# Patient Record
Sex: FEMALE | HISPANIC OR LATINO | Employment: FULL TIME | URBAN - METROPOLITAN AREA
[De-identification: names, ages, dates, MRNs, and addresses within clinical notes are randomized per-mention and may not be internally consistent; named-entity substitution may affect disease eponyms.]

---

## 2019-01-16 ENCOUNTER — TRANSFERRED RECORDS (OUTPATIENT)
Dept: HEALTH INFORMATION MANAGEMENT | Facility: CLINIC | Age: 34
End: 2019-01-16

## 2019-01-16 LAB
C TRACH DNA SPEC QL PROBE+SIG AMP: NEGATIVE
N GONORRHOEA DNA SPEC QL PROBE+SIG AMP: NEGATIVE
PAP-ABSTRACT: NORMAL
SPECIMEN DESCRIP: NORMAL
SPECIMEN DESCRIPTION: NORMAL

## 2020-02-10 ENCOUNTER — OFFICE VISIT (OUTPATIENT)
Dept: FAMILY MEDICINE | Facility: CLINIC | Age: 35
End: 2020-02-10
Payer: COMMERCIAL

## 2020-02-10 VITALS
DIASTOLIC BLOOD PRESSURE: 84 MMHG | OXYGEN SATURATION: 98 % | TEMPERATURE: 99.3 F | HEIGHT: 66 IN | BODY MASS INDEX: 37.45 KG/M2 | HEART RATE: 88 BPM | WEIGHT: 233 LBS | SYSTOLIC BLOOD PRESSURE: 136 MMHG

## 2020-02-10 DIAGNOSIS — F41.8 DEPRESSION WITH ANXIETY: ICD-10-CM

## 2020-02-10 DIAGNOSIS — E66.9 OBESITY (BMI 35.0-39.9 WITHOUT COMORBIDITY): ICD-10-CM

## 2020-02-10 DIAGNOSIS — E66.01 MORBID OBESITY (H): ICD-10-CM

## 2020-02-10 PROBLEM — R80.9 MICROALBUMINURIA DUE TO TYPE 2 DIABETES MELLITUS (H): Status: ACTIVE | Noted: 2019-07-03

## 2020-02-10 PROBLEM — H44.113 PANUVEITIS OF BOTH EYES: Status: ACTIVE | Noted: 2018-11-08

## 2020-02-10 PROBLEM — K76.0 NAFLD (NONALCOHOLIC FATTY LIVER DISEASE): Status: ACTIVE | Noted: 2019-07-11

## 2020-02-10 PROBLEM — E11.29 MICROALBUMINURIA DUE TO TYPE 2 DIABETES MELLITUS (H): Status: ACTIVE | Noted: 2019-07-03

## 2020-02-10 LAB
ANION GAP SERPL CALCULATED.3IONS-SCNC: 9 MMOL/L (ref 3–14)
BUN SERPL-MCNC: 9 MG/DL (ref 7–30)
CALCIUM SERPL-MCNC: 10 MG/DL (ref 8.5–10.1)
CHLORIDE SERPL-SCNC: 104 MMOL/L (ref 94–109)
CO2 SERPL-SCNC: 26 MMOL/L (ref 20–32)
CREAT SERPL-MCNC: 0.54 MG/DL (ref 0.52–1.04)
GFR SERPL CREATININE-BSD FRML MDRD: >90 ML/MIN/{1.73_M2}
GLUCOSE SERPL-MCNC: 164 MG/DL (ref 70–99)
HBA1C MFR BLD: 7 % (ref 0–5.6)
POTASSIUM SERPL-SCNC: 4.2 MMOL/L (ref 3.4–5.3)
SODIUM SERPL-SCNC: 139 MMOL/L (ref 133–144)

## 2020-02-10 PROCEDURE — 99203 OFFICE O/P NEW LOW 30 MIN: CPT | Performed by: NURSE PRACTITIONER

## 2020-02-10 PROCEDURE — 36415 COLL VENOUS BLD VENIPUNCTURE: CPT | Performed by: NURSE PRACTITIONER

## 2020-02-10 PROCEDURE — 83036 HEMOGLOBIN GLYCOSYLATED A1C: CPT | Performed by: NURSE PRACTITIONER

## 2020-02-10 PROCEDURE — 80048 BASIC METABOLIC PNL TOTAL CA: CPT | Performed by: NURSE PRACTITIONER

## 2020-02-10 RX ORDER — LISINOPRIL 2.5 MG/1
2.5 TABLET ORAL
COMMUNITY
Start: 2019-10-10 | End: 2021-06-03

## 2020-02-10 RX ORDER — SEMAGLUTIDE 1.34 MG/ML
1 INJECTION, SOLUTION SUBCUTANEOUS
COMMUNITY
Start: 2019-07-16 | End: 2020-02-10

## 2020-02-10 RX ORDER — ONDANSETRON 8 MG/1
TABLET, ORALLY DISINTEGRATING ORAL
COMMUNITY
Start: 2019-06-28 | End: 2021-05-25

## 2020-02-10 RX ORDER — PREDNISOLONE ACETATE 10 MG/ML
SUSPENSION/ DROPS OPHTHALMIC
COMMUNITY
Start: 2017-07-19 | End: 2021-05-25

## 2020-02-10 RX ORDER — LANCETS
EACH MISCELLANEOUS
COMMUNITY
Start: 2019-09-18

## 2020-02-10 RX ORDER — BLOOD SUGAR DIAGNOSTIC
STRIP MISCELLANEOUS
COMMUNITY
Start: 2019-08-21

## 2020-02-10 ASSESSMENT — ANXIETY QUESTIONNAIRES
1. FEELING NERVOUS, ANXIOUS, OR ON EDGE: MORE THAN HALF THE DAYS
5. BEING SO RESTLESS THAT IT IS HARD TO SIT STILL: SEVERAL DAYS
2. NOT BEING ABLE TO STOP OR CONTROL WORRYING: SEVERAL DAYS
IF YOU CHECKED OFF ANY PROBLEMS ON THIS QUESTIONNAIRE, HOW DIFFICULT HAVE THESE PROBLEMS MADE IT FOR YOU TO DO YOUR WORK, TAKE CARE OF THINGS AT HOME, OR GET ALONG WITH OTHER PEOPLE: SOMEWHAT DIFFICULT
6. BECOMING EASILY ANNOYED OR IRRITABLE: SEVERAL DAYS
7. FEELING AFRAID AS IF SOMETHING AWFUL MIGHT HAPPEN: NOT AT ALL
3. WORRYING TOO MUCH ABOUT DIFFERENT THINGS: SEVERAL DAYS
GAD7 TOTAL SCORE: 8

## 2020-02-10 ASSESSMENT — MIFFLIN-ST. JEOR: SCORE: 1783.63

## 2020-02-10 ASSESSMENT — PATIENT HEALTH QUESTIONNAIRE - PHQ9
5. POOR APPETITE OR OVEREATING: MORE THAN HALF THE DAYS
SUM OF ALL RESPONSES TO PHQ QUESTIONS 1-9: 10

## 2020-02-10 ASSESSMENT — PAIN SCALES - GENERAL: PAINLEVEL: NO PAIN (0)

## 2020-02-10 NOTE — Clinical Note
Please abstract the following data from this visit with this patient into the appropriate field in Epic:Tests that can be patient reported without a hard copy:{Quality Abstract List (Optional):083021}Other Tests found in the patient's chart through Chart Review/Care Everywhere:Pap smear done by this group Josue this date: 01/06/2019Note to Abstraction: If this section is blank, no results were found via Chart Review/Care Everywhere.

## 2020-02-10 NOTE — PATIENT INSTRUCTIONS
Start sertraline 25 mg (0.5 tablet) daily x10 days and then 50 mg (1 tablet) daily  Follow up in 3-4 weeks for med check either in person or on the phone    Labs today        Patient Education    At [facility], we strive to deliver an exceptional experience to you, every time we see you. If you receive a survey, please complete it as we do value your feedback.  If you have MyChart, you can expect to receive results automatically within 24 hours of their completion.  Your provider will send a note interpreting your results as well.   If you do not have MyChart, you should receive your results in about a week by mail.    Your care team:                            Family Medicine Internal Medicine   MD Branden Espinoza MD Shantel Branch-Fleming, MD Katya Georgiev PA-C Megan Hill, APRCHARISSA Lindsay MD Pediatrics   AMANDA Ling, MD Viviana Lenz APRN CNP   MD Donna Andujar MD Deborah Mielke, MD Kim Thein, APRN [facility]      Clinic hours: Monday - Thursday 7 am-7 pm; Fridays 7 am-5 pm.   Urgent care: Monday - Friday 11 am-9 pm; Saturday and Sunday 9 am-5 pm.  Pharmacy : Monday -Thursday 8 am-8 pm; Friday 8 am-6 pm; Saturday and Sunday 9 am-5 pm.     Clinic: [phone]   Pharmacy: [phone]

## 2020-02-10 NOTE — PROGRESS NOTES
Subjective     Jojo Mccallum is a 34 year old female who presents to clinic today for the following health issues:    HPI     LAST PAP 1/16/19      Diabetes Follow-up    How often are you checking your blood sugar? A few times a month  What time of day are you checking your blood sugars (select all that apply)?  Before and after meals  Have you had any blood sugars above 200?  No  Have you had any blood sugars below 70?  No    What symptoms do you notice when your blood sugar is low?  Shaky, Dizzy, Weak, Blurred vision and Confusion    What concerns do you have today about your diabetes? Having trouble with dairy. Patient Dx with diabetes 7/1/19      Do you have any of these symptoms? (Select all that apply)  No numbness or tingling in feet.  No redness, sores or blisters on feet.  No complaints of excessive thirst.  No reports of blurry vision.  No significant changes to weight.      BP Readings from Last 2 Encounters:   02/10/20 136/84     No results found for: A1C, LDL              How many servings of fruits and vegetables do you eat daily?  2-3    On average, how many sweetened beverages do you drink each day (Examples: soda, juice, sweet tea, etc.  Do NOT count diet or artificially sweetened beverages)?   0    How many days per week do you exercise enough to make your heart beat faster? 4    How many minutes a day do you exercise enough to make your heart beat faster? 20 - 29    How many days per week do you miss taking your medication? Once in a while    Thinks she's lactose intolerant now due to diarrhea everytime she has dairy    Wants to restart serotonin specific reuptake inhibitor  No thoughts to harm self/others  Feels safe  Previously took zoloft and it helped a lot    Exercising 3-4 times per week  Trying to watch diet  Her mom wants her to see weight  because she had surgery and found it helpful    Patient Active Problem List   Diagnosis     Depression with anxiety     Diabetes  mellitus type 2, uncontrolled, with complications (H)     Microalbuminuria due to type 2 diabetes mellitus (H)     NAFLD (nonalcoholic fatty liver disease)     Obesity (BMI 35.0-39.9 without comorbidity)     Panuveitis of both eyes     Obesity (BMI 35.0-39.9) with comorbidity (H)     History reviewed. No pertinent surgical history.    Social History     Tobacco Use     Smoking status: Current Some Day Smoker     Smokeless tobacco: Never Used   Substance Use Topics     Alcohol use: Not Currently     Family History   Problem Relation Age of Onset     Diabetes Mother      Kidney Disease Father      Dialysis Father      Diabetes Maternal Grandmother      Cerebrovascular Disease Maternal Grandmother         stroke         Current Outpatient Medications   Medication Sig Dispense Refill     ACCU-CHEK GUIDE test strip U TO TEST TID       blood glucose monitoring (ACCU-CHEK FASTCLIX) lancets U UTD BID       etonogestrel (IMPLANON/NEXPLANON) 68 MG IMPL Inject 1 each Subcutaneous       lisinopril (PRINIVIL/ZESTRIL) 2.5 MG tablet Take 2.5 mg by mouth       metFORMIN (GLUCOPHAGE) 500 MG tablet Take 1,000 mg by mouth 2 times daily (with meals)       ondansetron (ZOFRAN-ODT) 8 MG ODT tab DIS 1 T ON THE TONGUE Q 8 H FOR UP TO 3 DAYS PRF NAUSEA OR VOM       prednisoLONE acetate (PRED FORTE) 1 % ophthalmic suspension INSTILL ONE GTT INTO OS TID       semaglutide (OZEMPIC) 2 MG/1.5ML pen Inject 1 mg Subcutaneous every 7 days       sertraline (ZOLOFT) 50 MG tablet Take 0.5 tablets (25 mg) by mouth daily for 10 days, THEN 1 tablet (50 mg) daily for 20 days. 30 tablet 0     No Known Allergies  No lab results found.   BP Readings from Last 3 Encounters:   02/10/20 136/84    Wt Readings from Last 3 Encounters:   02/10/20 105.7 kg (233 lb)                      Reviewed and updated as needed this visit by Provider  Tobacco  Allergies  Meds  Problems  Med Hx  Surg Hx  Fam Hx         Review of Systems   ROS COMP: Constitutional, HEENT,  "cardiovascular, pulmonary, gi and gu systems are negative, except as otherwise noted.      Objective    /84 (BP Location: Right arm, Patient Position: Chair, Cuff Size: Adult Large)   Pulse 88   Temp 99.3  F (37.4  C) (Oral)   Ht 1.676 m (5' 6\")   Wt 105.7 kg (233 lb)   LMP 02/10/2020   SpO2 98%   BMI 37.61 kg/m    Body mass index is 37.61 kg/m .  Physical Exam   GENERAL: healthy, alert and no distress  EYES: Eyes grossly normal to inspection, PERRL and conjunctivae and sclerae normal  HENT: ear canals and TM's normal, nose and mouth without ulcers or lesions  NECK: no adenopathy, no asymmetry, masses, or scars and thyroid normal to palpation  RESP: lungs clear to auscultation - no rales, rhonchi or wheezes  CV: regular rate and rhythm, normal S1 S2, no S3 or S4, no murmur, click or rub, no peripheral edema and peripheral pulses strong  ABDOMEN: soft, nontender, no hepatosplenomegaly, no masses and bowel sounds normal  MS: no gross musculoskeletal defects noted, no edema  PSYCH: mentation appears normal, affect normal/bright    Diagnostic Test Results:  Labs reviewed in Epic  No results found for this or any previous visit (from the past 24 hour(s)).        Assessment & Plan     1. Diabetes mellitus type 2, uncontrolled, with complications (H)  Doesn't need refills. Checking labs today. Follow up in 3-6 months, depending on a1c today.  - metFORMIN (GLUCOPHAGE) 500 MG tablet; Take 1,000 mg by mouth 2 times daily (with meals)  - semaglutide (OZEMPIC) 2 MG/1.5ML pen; Inject 1 mg Subcutaneous every 7 days  - Basic metabolic panel  (Ca, Cl, CO2, Creat, Gluc, K, Na, BUN)  - Hemoglobin A1c    2. Depression with anxiety  No thoughts to harm self/others. Feels safe. Follow up in 3-4 weeks with either phone or in person visit.  - sertraline (ZOLOFT) 50 MG tablet; Take 0.5 tablets (25 mg) by mouth daily for 10 days, THEN 1 tablet (50 mg) daily for 20 days.  Dispense: 30 tablet; Refill: 0    3. Obesity (BMI " "35.0-39.9 without comorbidity)  - BARIATRIC ADULT REFERRAL    4. Morbid obesity (H)  - BARIATRIC ADULT REFERRAL       Tobacco Cessation:   reports that she has been smoking. She has never used smokeless tobacco.  Tobacco Cessation Action Plan: Self help information given to patient      BMI:   Estimated body mass index is 37.61 kg/m  as calculated from the following:    Height as of this encounter: 1.676 m (5' 6\").    Weight as of this encounter: 105.7 kg (233 lb).   Weight management plan: Patient referred to endocrine and/or weight management specialty        See Patient Instructions    Return in about 3 months (around 5/10/2020).     The benefits, risks and potential side effects were discussed in detail. Black box warnings discussed as relevant. All patient questions were answered. The patient was instructed to follow up immediately if any adverse reactions develop.    Return precautions discussed, including when to seek urgent/emergent care.    Patient verbalizes understanding and agrees with plan of care. Patient stable for discharge.      KELL Vitale [facility]      "

## 2020-02-10 NOTE — NURSING NOTE
Please abstract the following data from this visit with this patient into the appropriate field in Epic:    Tests that can be patient reported without a hard copy:        Other Tests found in the patient's chart through Chart Review/Care Everywhere:    Pap smear done by this group [facility] this date: 01/06/2019    Note to Abstraction: If this section is blank, no results were found via Chart Review/Care Everywhere.

## 2020-02-11 ASSESSMENT — ANXIETY QUESTIONNAIRES: GAD7 TOTAL SCORE: 8

## 2020-02-25 ENCOUNTER — OFFICE VISIT (OUTPATIENT)
Dept: SURGERY | Facility: CLINIC | Age: 35
End: 2020-02-25
Payer: COMMERCIAL

## 2020-02-25 VITALS
HEIGHT: 66 IN | DIASTOLIC BLOOD PRESSURE: 80 MMHG | SYSTOLIC BLOOD PRESSURE: 122 MMHG | BODY MASS INDEX: 37.48 KG/M2 | OXYGEN SATURATION: 100 % | HEART RATE: 95 BPM | WEIGHT: 233.2 LBS

## 2020-02-25 DIAGNOSIS — E66.9 OBESITY (BMI 35.0-39.9 WITHOUT COMORBIDITY): Primary | ICD-10-CM

## 2020-02-25 DIAGNOSIS — E28.2 PCOS (POLYCYSTIC OVARIAN SYNDROME): ICD-10-CM

## 2020-02-25 DIAGNOSIS — K76.0 NAFLD (NONALCOHOLIC FATTY LIVER DISEASE): ICD-10-CM

## 2020-02-25 PROCEDURE — 99204 OFFICE O/P NEW MOD 45 MIN: CPT | Performed by: PHYSICIAN ASSISTANT

## 2020-02-25 RX ORDER — PHENTERMINE HYDROCHLORIDE 37.5 MG/1
37.5 TABLET ORAL
Qty: 90 TABLET | Refills: 0 | Status: SHIPPED | OUTPATIENT
Start: 2020-02-25 | End: 2021-03-24

## 2020-02-25 ASSESSMENT — MIFFLIN-ST. JEOR: SCORE: 1784.54

## 2020-02-25 NOTE — PATIENT INSTRUCTIONS
"Goal:  Maintain workout plan  Get healthy snacks to keep at your desk  Dietitian education  If starts phentermine will have blood pressure and pulse checked in 7 days. If blood pressure is elevated > 140/90 or pulse > 100 will contact clinic      Information about the Weight Loss Medication Phentermine      Phentermine is a stimulant medication related to the amphetamine class of medication but with a lower risk of dependence and addiction.  It is used for weight loss by suppressing the appetite region of the brain.  It also may speed up the metabolic rate and give a person more energy.  Like any medication there are potential side effects and the most common are:  Dry mouth occurs in almost everyone (hydrate well), fewer people experience Palpatations, fast heart rate, elevation of blood pressure, restlessness, insomnia, dizziness, change in mood, tremor, headache, changes in bowel movements,itchiness, changes in sex drive.    Some people can develop serious side effects which include:  Heart strain (\"ischemia\").  Tachycardia (fast heart rate or irregular heart rate).  Hypertension  Pulmonary Hypertension  Psychosis  Dependency and abuse has occurred in some.    We do not recommend taking it in combination with the following medications due to potential drug interactions which can increase the risk of side effects and/or potential for seizures:    Absolutely contraindicated are:  Amphetamines or other stimulants like ADHD medication: (dextroamphetamine, amphetamine, diethylpropion, isocarboxazid, methamphetamine, lisdexamfetamine, benzphetamine,dexmethylphenidate, methylphenidate, selegiline patch, sibutramine, tranylcypromine.    Avoid use with:   Dopamine, dobutamine, ephedra, ephedrine, epinephrine, isoproterenol, linezolid, norepinephrine, phenylephrine injection, venlafaxine (Effexor).    Monitor or modify dose with:  Acebutolol, atenolol, betaxolol, bisoprolol, carvedilol, droxidopa, esmolol, labetalol, " magnesium citrate, metoprolol, nadolol, nebivolol, penbutolol, pindolol, propranolol, sotalol, timolol.    Caution with: armodafinil,betaxolol eye drops, brexpiprazole, bupropion, busulfan, caffeine, carteolol drops, enzalutaminde, ginseng, green tea, guarana, levobunolol drops, lindane cream, modafinil, afrin nasal spray (oxymetazoline), pamabrom, phenylephrine oral and nasal spray, pseudoephedrine (sudafed), rasgiline, sleegiline, bowel prep, tiagabine, timolol drops,  TRAMADOL due to increased risk of seizures.    The current cheapest place to fill your prescription is at [facility], [place] or the [place] pharmacy on the first level of this building and is $20-$30 for 90 tablets.  Occasionally, [employer] and [employer] have price matched, so call around and get the best price for you.  Other pharmacies may charge closer to$70- $100 for the same prescription. You don't have to be a member to use the pharmacy at [facility] currently.      Dosing:  We start with half a tablet for the first 2 weeks and if tolerating it without problems, you can take up to one full tablet daily in the morning after breakfast.  For those with evening hunger problems, sometimes half a tablet in the morning and half a tablet around 1-2 pm can be effective, however, risks of nighttime insomnia/restless increase with afternoon dosing so call me at the clinic if considering this regimen or having any issues.  You only have to use the amount effective for you, not to exceed one full tablet.  It can also be used situationaly and does not have to be taken every day. As always, if any questions give us a call the [facility] Loss Clinic at [phone] or message me through [employer].      Eat Better ? Move More ? Live Well    Eat 3 nutrient-rich meals each day     Don t skip meals--it will cause you to overeat later in the day!     Eating fiber (vegetables/fruits/whole grains) and protein with meals helps you stay full longer     Choose foods with less than 10  grams of sugar and 5 grams of fat per serving to prevent excess calories and weight re-gain   Eat around the same times each day to develop a routine eating schedule    Avoid snacking unless physically hungry.   Planned snacks: 1-2 times per day and no more than 150 calories    Eat protein first    Protein helps with healing, maintaining adequate muscle mass, reducing hunger and optimizing nutritional status    Aim for 60-80 grams of protein per day   Fill up on Fiber    Fiber comes from plants--fruits, veggies, whole grains, nuts/seeds and beans    Fiber is low in calories, high in phytonutrients and helps you stay full longer    Aim for 25-35 grams per day by eating fiber with meals and snacks  Eat [id number]    Take 20-30 minutes to eat each meal by taking small bites, chewing foods to applesauce consistency or 20-30 times before you swallow    Eating foods too fast can delay satiety/fullness signals and increase overeating   ? Slow down your eating by using toddler utensils, putting your fork/spoon down between bites and not watching TV or emailing during meals!   Keep a Journal          Writing down what you eat, how you feel and when you are active helps you identify new changes to work on from week to week          Look for ways to cut 100 calories from your current diet 2-3 times per day  Drink 64 ounces of 0-Calorie drinks between meals    Water    Zero calorie [employer]  or Vitamin Water      [employer]  Zero Calories    Crystal Light , Sugar-Free Matt-Aid , and other sugar-free lemonade or flavored black    Keep Caffeine to less than 300mg per day ie: 3-6oz cups coffee     Work up to 45-60 minutes of physical activity most days of the week    Helps with losing weight and prevent regaining those extra pounds!     Do a combo of cardio (walking/water exercises) and strength training (lifting weights/Vinyasa yoga)    Avoid Mindless Eating    Be present when you eat--take note of the smell, taste and  quality of your food    Make a list of alternative activities you could do to prevent eating out of boredom/stress  ? Go for a walk, call a friend, chew gum, paint your nails, re-organize the garage, etc
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
## 2020-02-26 ENCOUNTER — TELEPHONE (OUTPATIENT)
Dept: SURGERY | Facility: CLINIC | Age: 35
End: 2020-02-26

## 2020-02-26 NOTE — TELEPHONE ENCOUNTER
Prior Authorization Retail Medication Request    Medication/Dose: phentermine HCI 37.5 mg tablets  ICD code (if different than what is on RX):  E66.9      Insurance Name:  [employer] cross blue shield  Insurance ID:  [id number]       Pharmacy Information (if different than what is on RX)  [facility]  Phone:  [phone]  Fax: [phone]

## 2020-02-28 NOTE — TELEPHONE ENCOUNTER
PA Initiation    Medication: phentermine HCI 37.5 mg tablets  Insurance Company: Other (see comments)Comment:  [employer]  Pharmacy Filling the Rx: [id number] [address] [place] AT [facility]  [address]  Filling Pharmacy Phone: [phone]  Filling Pharmacy Fax: [phone]  Start Date: 2/28/2020

## 2020-03-02 ENCOUNTER — ALLIED HEALTH/NURSE VISIT (OUTPATIENT)
Dept: SURGERY | Facility: CLINIC | Age: 35
End: 2020-03-02
Payer: COMMERCIAL

## 2020-03-02 VITALS — WEIGHT: 231.7 LBS | BODY MASS INDEX: 37.4 KG/M2

## 2020-03-02 DIAGNOSIS — E66.9 OBESITY (BMI 35.0-39.9 WITHOUT COMORBIDITY): ICD-10-CM

## 2020-03-02 PROCEDURE — 97802 MEDICAL NUTRITION INDIV IN: CPT | Performed by: DIETITIAN, REGISTERED

## 2020-03-02 NOTE — PROGRESS NOTES
"MEDICAL WEIGHT LOSS INITIAL EVALUATION  DIAGNOSIS:  Obese, class III    NUTRITION HISTORY:  Breakfast: milk and cereal (kid friendly) Frosted Flakes, Sarthak Charms, Captain Crunch or banana + protein shake (Aldi elevate shake 15 grams CHO) 6:30 -7:00 (at home) or 8:00-8:30 (at work) out for breakfast -1 egg, 2 toast, hash browns (1/2), 1/2 sausage maureen   SnackL slim jims or parfait or poptart or chips or Pringles (10:30 AM)-brought amirah's today   Lunch: salad or pepperoni and salami [employer]way 6\" with deli mustard and chaparro with veggies or soup or Qudoba rice bowl   Dinner: chicken breast + rice (white) or vegetables + brussel spirouts, aspatagus or Reid's soup or bologna sandwich   Snacks: (biggest struggle) chips-spicy salty chips   Beverage choices: Celsius (200 mg caffiene), tea, water, juice boxes    Dining out: [employer] -chicken sandwich or 2 cheeseburgers with french fries   Binge eating: none  Emotional eating: on occasion will emotionally eat   Exercise: Switch resistance game (3 times per week)   Other: Patient states she has limited time as co parents with her son's father and brings son to father's during the week and weekend.  Patient prepares son's meal and then prepares her own meal once he goes to bed.  Patient eats lunch away from desk and snacks at desk.  Patient working with diabetes educator and aims for 35 grams carbohydrates per meal.  Patient does not check her blood glucose regularly.           MEDICATIONS:  Phentermine     ANTHROPOMETRICS:  Height: 5'6\"  Weight: 231.7 lbs  BMI: 37.4 kg/m2  NUTRITION DIAGNOSIS:   Obese, class III related to excess energy intake as evidence by BMI of  37.4 kg/m2  NUTRITION INTERVENTIONS  Nutrition Prescription:  Recommend modified energy- nutrient intake  Implementation:  Nutrition Education (Content):    Discussed portion sizes and plate method     Discussed behavior modification     Reviewed healthy snacking and beverage choice  Nutrition Education " (Application):     Patient to practice goals as stated below    Patient verbalizes understanding of diet by     Anticipate fair-good compliance    Goals:  Exercise 3-4 times per week  If eating salty snack, eat away from desk  Meal plan 1-2 meals per week    FOLLOW UP AND MONITORING:   Other  - follow up in 8 weeks.     TIME SPENT WITH PATIENT:  30 minutes   Madelyn Vidal, RD, LD  [facility] Outpatient Dietitian  [phone] (office phone)     no muscle weakness/no myalgia/no joint swelling/no arthritis/no muscle cramps

## 2020-03-04 NOTE — TELEPHONE ENCOUNTER
PRIOR AUTHORIZATION DENIED    Medication: phentermine HCI 37.5 mg tablets    Denial Date: 3/4/2020    Denial Rational: Medications used for weight loss are excluded from coverage.      Appeal Information:  If provider would like to appeal we will need a detailed letter of medical necessity to start the process. Then re-route this request back to the PA pool.

## 2020-03-11 ENCOUNTER — HEALTH MAINTENANCE LETTER (OUTPATIENT)
Age: 35
End: 2020-03-11

## 2020-03-17 ENCOUNTER — MYC REFILL (OUTPATIENT)
Dept: FAMILY MEDICINE | Facility: CLINIC | Age: 35
End: 2020-03-17

## 2020-03-17 DIAGNOSIS — F41.8 DEPRESSION WITH ANXIETY: ICD-10-CM

## 2020-03-17 NOTE — TELEPHONE ENCOUNTER
"Requested Prescriptions   Pending Prescriptions Disp Refills     sertraline (ZOLOFT) 50 MG tablet  Last Written Prescription Date:  02/10/2020  Last Fill Quantity: 30,  # refills: 0   [place] [facility] [place] [facility] [place] provider:  02/10/2020[place]   Future Office Visit:    30 tablet 0     Sig: Take 0.5 tablets (25 mg) by mouth daily for 10 days, THEN 1 tablet (50 mg) daily for 20 days.       SSRIs Protocol Failed - 3/17/2020  7:56 AM        Failed - PHQ-9 score less than 5 in past 6 months     Please review last PHQ-9 score.           Passed - Medication is active on med list        Passed - Patient is age 18 or older        Passed - No active pregnancy on record        Passed - No positive pregnancy test in last 12 months        Passed - Recent (6 mo) or future (30 days) visit within the authorizing provider's specialty     Patient had office visit in the last 6 months or has a visit in the next 30 days with authorizing provider or within the authorizing provider's specialty.  See \"Patient Info\" tab in inbasket, or \"Choose Columns\" in Meds & Orders section of the refill encounter.                 "

## 2020-03-17 NOTE — TELEPHONE ENCOUNTER
"Requested Prescriptions   Pending Prescriptions Disp Refills     sertraline (ZOLOFT) 50 MG tablet  Last Written Prescription Date:  02/10/2020  Last Fill Quantity: 30,  # refills: 0   Last Office Visit with [facility] or [facility] prescribing provider:  02/10/2020-[place]   Future Office Visit:    30 tablet 0     Sig: Take 0.5 tablets (25 mg) by mouth daily for 10 days, THEN 1 tablet (50 mg) daily for 20 days.       SSRIs Protocol Failed - 3/17/2020  7:56 AM        Failed - PHQ-9 score less than 5 in past 6 months     Please review last PHQ-9 score.           Passed - Medication is active on med list        Passed - Patient is age 18 or older        Passed - No active pregnancy on record        Passed - No positive pregnancy test in last 12 months        Passed - Recent (6 mo) or future (30 days) visit within the authorizing provider's specialty     Patient had office visit in the last 6 months or has a visit in the next 30 days with authorizing provider or within the authorizing provider's specialty.  See \"Patient Info\" tab in inbasket, or \"Choose Columns\" in Meds & Orders section of the refill encounter.                 "

## 2020-04-22 ENCOUNTER — OFFICE VISIT (OUTPATIENT)
Dept: SURGERY | Facility: CLINIC | Age: 35
End: 2020-04-22
Payer: COMMERCIAL

## 2020-04-22 VITALS — HEIGHT: 66 IN | BODY MASS INDEX: 36 KG/M2 | WEIGHT: 224 LBS

## 2020-04-22 DIAGNOSIS — K76.0 NAFLD (NONALCOHOLIC FATTY LIVER DISEASE): ICD-10-CM

## 2020-04-22 DIAGNOSIS — E66.9 OBESITY (BMI 35.0-39.9 WITHOUT COMORBIDITY): Primary | ICD-10-CM

## 2020-04-22 DIAGNOSIS — E28.2 PCOS (POLYCYSTIC OVARIAN SYNDROME): ICD-10-CM

## 2020-04-22 PROCEDURE — 99214 OFFICE O/P EST MOD 30 MIN: CPT | Mod: 95 | Performed by: PHYSICIAN ASSISTANT

## 2020-04-22 ASSESSMENT — MIFFLIN-ST. JEOR: SCORE: 1742.81

## 2020-04-22 NOTE — PROGRESS NOTES
"Jojo Mccallum is a 34 year old female who is being evaluated via a billable telephone visit.      The patient has been notified of following:     \"This telephone visit will be conducted via a call between you and your physician/provider. We have found that certain health care needs can be provided without the need for a physical exam.  This service lets us provide the care you need with a short phone conversation.  If a prescription is necessary we can send it directly to your pharmacy.  If lab work is needed we can place an order for that and you can then stop by our lab to have the test done at a later time.    Telephone visits are billed at different rates depending on your insurance coverage. During this emergency period, for some insurers they may be billed the same as an in-person visit.  Please reach out to your insurance provider with any questions.    If during the course of the call the physician/provider feels a telephone visit is not appropriate, you will not be charged for this service.\"    Patient has given verbal consent for Telephone visit?  Yes    How would you like to obtain your AVS? [facility]            2020      Return Telephonic Medical Weight Management Note     Jojo Mccallum  MRN:  [id number]  :  1985      Dear Elvia Cano,    I had the pleasure of doing a telephonic visit with your patient Jojo Mccallum.  She is a 34 year old female who I am continuing to see for treatment of obesity related to:       2020   I have the following health issues associated with obesity: Type II Diabetes, Polycystic Ovarian Syndrome   I have the following symptoms associated with obesity: Depression, Fatigue       INTERVAL HISTORY:  Was started on phentermine at last visit  She is at home due to COVID so it is easier to cook meals.  Does not have to rely on an ordering service.  She is taking phentermine 18 mg daily.  At first she noticed a lot of energy and moving more rapidly.  " This has since resolved.  Feels like the phentermine has helped with her appetite.  Full with less amount of food.  At first felt guilty that she was not eating all her food and worried about left overs.  She is no longer feeling this way  Pateint was seen at her eye doctors office about 1 week after starting phentemrine and had blood pressure checked and it was normal.  Denies any chest pain, increased anxiety, dry mouth or constipation.      CURRENT WEIGHT (PATIENT REPORTED):  224 lbs 0 oz    Wt Readings from Last 4 Encounters:   04/22/20 224 lb (101.6 kg)   03/02/20 231 lb 11.2 oz (105.1 kg)   02/25/20 233 lb 3.2 oz (105.8 kg)   02/10/20 233 lb (105.7 kg)       BARIATRIC METRICS:  Current Weight: 224 lb (101.6 kg)  Body mass index is 36.15 kg/m .   Wt change since last visit (lbs): -7.7  Cumulative weight loss (lbs): 9.2    DIETARY HISTORY  Meals Per Day: 3  Food Diary:   B: Special K with whole milk - lactose free.  Yesterday had frozen pancakes with lewis. Water  L:  Ham and turkey sandwich with honey wheat bread.  Had Wooten and slice provolone cheese.  Crystal light lemonade.  Tuna salad - sunkist package with wooten and make sandwich.  Water  D: Ordered thin pizza.  Had 2 slices. Had Bon Jack this week and had chicken wings with gricelda slaw  Snacks Per Day: 1  Typical Snack: pistacchios, cuties, bluberies, grapes  Calorie Containing Beverages: whole milk. No alcohol  Typical Protein Food Choices: meat and dairy  Meals at Restaurant per week: 2-3 times will order out    Positive Changes Since Last Visit: less snacking and more cooking at home  Struggling With: activity    Getting Adequate Protein: yes  Sleeping 7-8 hours/day: yes  Stress management ok      Exercise:   At home with kindergardener. Otherwise no exercise    ROS:  General  Fatigue: not really  Insomnia: No  HEENT  Dry Mouth: No  Hx of glaucoma: No  Vision changes: panuvietis - takes drops  Cardiovascular  Chest Pain with Exertion: No  Palpitations:  "No  Hx of heart disease: No  Psychiatric  Moods: No  Neurologic:  Hx of seizures: No  Paresthesias: No  Headaches: No    History of kidney stones: No  History of kidney disease: No  Current birth control: Implant      MEDICATIONS:   Current Outpatient Medications   Medication     ACCU-CHEK GUIDE test strip     blood glucose monitoring (ACCU-CHEK FASTCLIX) lancets     etonogestrel (IMPLANON/NEXPLANON) 68 MG IMPL     lisinopril (PRINIVIL/ZESTRIL) 2.5 MG tablet     metFORMIN (GLUCOPHAGE) 500 MG tablet     ondansetron (ZOFRAN-ODT) 8 MG ODT tab     phentermine (ADIPEX-P) 37.5 MG tablet     prednisoLONE acetate (PRED FORTE) 1 % ophthalmic suspension     semaglutide (OZEMPIC) 2 MG/1.5ML pen     sertraline (ZOLOFT) 50 MG tablet     No current facility-administered medications for this visit.          PE:  Ht 5' 6\" (1.676 m)   Wt 224 lb (101.6 kg)   BMI 36.15 kg/m    GENERAL Pt sounds in NAD      ASSESSMENT/PLAN:     Obesity BMI 35  PCOS  Diabetes Mellitus    Would like to continue with phentermine.  Since she has only been taking a half dose she has #60 tabs left to  at pharmacy.  She will increase to 1 tab daily and has agreed again to get her blood pressure and pulse checked after increasing dose.  Will contact clinic if elevated. No refills needed before her next appointment.  Encouraged her to schedule in a walking time with her son everyday to help establish a routine.    Patient to be seen in 2 months    Call start time: 10:26  Call end time: 10:59    Virginia Vasquez MS, PA-C    Phone call duration: 33 minutes    Virginia Vasquez MS, PA-C          "

## 2020-05-02 NOTE — PROGRESS NOTES
"Jojo Mccallum is a 34 year old female who is being evaluated via a billable telephone visit.      The patient has been notified of following:     \"This telephone visit will be conducted via a call between you and your physician/provider. We have found that certain health care needs can be provided without the need for a physical exam.  This service lets us provide the care you need with a short phone conversation.  If a prescription is necessary we can send it directly to your pharmacy.  If lab work is needed we can place an order for that and you can then stop by our lab to have the test done at a later time.    Telephone visits are billed at different rates depending on your insurance coverage. During this emergency period, for some insurers they may be billed the same as an in-person visit.  Please reach out to your insurance provider with any questions.    If during the course of the call the physician/provider feels a telephone visit is not appropriate, you will not be charged for this service.\"    Patient has given verbal consent for Telephone visit?  Yes    What phone number would you like to be contacted at? [phone]    How would you like to obtain your AVS? [facility]      MEDICAL WEIGHT LOSS FOLLOW UP      DIAGNOSIS:  Class II Obesity    NUTRITION HISTORY:    Breakfast: Special K with lactose free whole milk,some times 2 strips lewis @ 7:30     Lunch:  [place]/ ham sandwich, lettuce tomatoes, mustard, chaparro or tuna salad on crackers @ 1:00     Dinner:  Chicken breast or beef, asparagus, or green beans, sometimes potatoes @ 6-6:30     Snacks:  Fresh berries or oranges or grapefruit     Beverage Choices:  water, enhanced water,rarely drinks ETOH (1 drink every 6-8 weeks)     Exercise: walking inconsistently (mushroom hunting)     Other: Feeling very busy with working from home for several weeks and having her 5 year old son at home. Enjoys cooking at home most of the time since COVID-19. Has joined a " "[employer] group for [employer]. She is pleased with her weight loss, but admits she could get more focused on exercise.    ANTHROPOMETRICS:    Initial Weight: 233.2 pounds    Previous Weight:  231.7 pounds    Current Weight:  224 pounds(stated)    Weight Change:  decrease 7.7 pounds/9.2 pounds overall    BMI: 36.15 kg/m2    MEDICATIONS:  37.5 mg Phentermine    EVALUATION/PROGRESS TOWARDS GOALS:  Previous Goals:  Exercise 3-4 times per week- not met  If eating salty snack, eat away from desk-met  Meal plan 1-2 meals per week-met    Previous Nutrition Diagnosis:  Obese class II related to excess energy intake as evidence by BMI of 37.4 kg/m2     Current Nutrition Diagnosis:   Obese class II related to excess energy intake as evidence by BMI of 36.15 kg/m2  No change      INTERVENTION:    Nutrition Prescription:  Recommend modified nutrient intake by decreasing energy intake    Implementation:    Meals and Snacks: 3/1    Nutrition Education (Content):    Discussed previous goals and determined new goals    Encourage physical activity    Supported patient in attempted weight loss and behavior changes    Congratulated patient on successful weight loss     Reviewed \"Summary of Volumetrics Eating\" and will send pt written information via My Chart    Anticipate good compliance    Goals:  Exercise at least 2X per week (focus on days off from work) for 60 minutes  Track intake/ activity on My fitness Pal 50% of the time and aim for 2049-8310 kcal/ day (12-14 kcal/kg)    Follow Up/Monitoring:  Other  -  patient to follow up in 4 weeks    Time Spent With Patient:  20 Minutes    Carter Tang RD, HOWIE  [facility]  [phone]  "

## 2020-05-04 ENCOUNTER — VIRTUAL VISIT (OUTPATIENT)
Dept: SURGERY | Facility: CLINIC | Age: 35
End: 2020-05-04
Payer: COMMERCIAL

## 2020-05-04 VITALS — BODY MASS INDEX: 36.15 KG/M2 | WEIGHT: 224 LBS

## 2020-05-04 PROCEDURE — 97803 MED NUTRITION INDIV SUBSEQ: CPT | Mod: 95 | Performed by: DIETITIAN, REGISTERED

## 2020-06-30 DIAGNOSIS — F41.8 DEPRESSION WITH ANXIETY: ICD-10-CM

## 2020-06-30 NOTE — LETTER
July 2, 2020      Jojo Mccallum  [address]   [place] [address]        Dear ez,    At [facility] we care about your health and are committed to providing quality patient care. Regular appointments are a vital part of the care and management of your health and can help prevent many of the complications that can occur.       It has come to our attention that you have been given a 90 day refill of sertraline and that you are due for a visit with your provider for further refills.  Please call [facility] at [phone] or use [employer]ingo to schedule your appointment.       Sincerely,   [facility] Care Team

## 2020-07-01 ENCOUNTER — TELEPHONE (OUTPATIENT)
Dept: SURGERY | Facility: CLINIC | Age: 35
End: 2020-07-01

## 2020-07-01 NOTE — TELEPHONE ENCOUNTER
Routing refill request to provider for review/approval because:    End date of 4/22/20 and PHQ-9 score not less than 5 in past 6 months       Wendie Maldonado RN

## 2020-07-02 NOTE — TELEPHONE ENCOUNTER
Reminder letter to schedule needed appt for further refills mailed to pt's home address.      Trudi BECK (R))

## 2020-09-21 NOTE — TELEPHONE ENCOUNTER
This writer attempted to contact pt on 09/21/20      Reason for call schedule lab and left message.  [employer] message also sent    If patient calls back:   Schedule Lab appointment within 1 month with lab, document that pt called and close encounter         Trudi Irwin

## 2020-09-21 NOTE — TELEPHONE ENCOUNTER
Requested Prescriptions   Pending Prescriptions Disp Refills     semaglutide (OZEMPIC) 2 MG/1.5ML pen 2 pen 0     Sig: Inject 1 mg Subcutaneous every 7 days       There is no refill protocol information for this order        Routing refill request to provider for review/approval because:  Drug not on the [facility] refill protocol         Susana Unger RN, BSN, PHN

## 2020-09-22 NOTE — TELEPHONE ENCOUNTER
This writer attempted to contact pt on 09/22/20      Reason for call schedule lab and left message.      If patient calls back:   Schedule Lab appointment within 1 month with lab, document that pt called and close encounter         Trudi Irwin

## 2020-10-05 DIAGNOSIS — F41.8 DEPRESSION WITH ANXIETY: ICD-10-CM

## 2020-10-05 NOTE — LETTER
October 9, 2020      Jojo Mccallum  [address]   [place] [address]        Dear ,      At [facility] we care about your health and are committed to providing quality patient care. Regular appointments are a vital part of the care and management of your health and can help prevent many of the complications that can occur.       It has come to our attention that you have been given a one time refill of sertraline (ZOLOFT) and that you are due for a visit with your provider for further refills.  Please call [facility] at [phone] or use [employer] to schedule your appointment.       Sincerely,   [facility] Care Team

## 2020-10-08 NOTE — TELEPHONE ENCOUNTER
Routing refill request to provider for review/approval because:  PHQ-9 and visit fail protocol.    Evelyn Saenz RN, [facility] Triage

## 2020-10-19 NOTE — LETTER
October 22, 2020      Jojo Mccallum  [address]   [place] [address]        Dear     At [facility] we care about your health and are committed to providing quality patient care. Regular appointments are a vital part of the care and management of your health and can help prevent many of the complications that can occur.       It has come to our attention that you have been given a one time refill of semaglutide (OZEMPIC) and that you are due for lab work followed by a visit with your provider for further refills.  Please call [facility] at [phone] or use [employer] to schedule your appointment.       Sincerely,   [facility] Care Team

## 2020-10-22 NOTE — TELEPHONE ENCOUNTER
Reminder letter to schedule needed appts for further refills mailed to pt's home address.      Trudi BECK (R))

## 2020-10-22 NOTE — TELEPHONE ENCOUNTER
"Routing refill request to provider for review/approval because:  Labs not current:  A1C        Requested Prescriptions   Pending Prescriptions Disp Refills     semaglutide (OZEMPIC) 2 MG/1.5ML pen 2 pen 0     Sig: Inject 1 mg Subcutaneous every 7 days       GLP-1 Agonists Protocol Failed - 10/19/2020 12:53 PM        Failed - HgbA1C in past 3 or 6 months     If HgbA1C is 8 or greater, it needs to be on file within the past 3 months.  If less than 8, must be on file within the past 6 months.     Recent Labs   Lab Test 02/10/20  1709   A1C 7.0*             Failed - Recent (6 mo) or future (30 days) visit within the authorizing provider's specialty     Patient had office visit in the last 6 months or has a visit in the next 30 days with authorizing provider.  See \"Patient Info\" tab in inbasket, or \"Choose Columns\" in Meds & Orders section of the refill encounter.            Passed - Medication is active on med list        Passed - Patient is age 18 or older        Passed - No active pregnancy on record        Passed - Normal serum creatinine on file in past 12 months     Recent Labs   Lab Test 02/10/20  1709   CR 0.54       Ok to refill medication if creatinine is low          Passed - No positive pregnancy test in past 12 months           Susana Unger RN, BSN, PHN    "

## 2020-11-19 NOTE — TELEPHONE ENCOUNTER
Routing refill request to provider for review/approval because:  Labs not current:  A1C  Visit is not current.    Evelyn Saenz RN, [facility] Triage

## 2020-12-17 NOTE — TELEPHONE ENCOUNTER
Patient has been given several julissa refills and has not followed up. No further refills will be given.

## 2020-12-17 NOTE — TELEPHONE ENCOUNTER
Routing refill request to provider for review/approval because:  Labs not current:  A1C  Visit fails protocol.    Evelyn Saenz RN, [facility] Triage

## 2020-12-17 NOTE — TELEPHONE ENCOUNTER
This writer attempted to contact pt on 12/17/20      Reason for call schedule visit for refill and left message.    Past myC messages not read    If patient calls back:   Schedule virtual appointment within 2 weeks with PCP, document that pt called and close encounter         Trudi Irwin

## 2020-12-18 NOTE — TELEPHONE ENCOUNTER
Pt has scheduled video visit with Elvia Cano CNP on 12/22/20.  Pt is out of medication.  She will call back  To place a possible julissa med refill now that she is scheduled.  She will have to call us back with pharmacy information since she is traveling and doesn't have a preferred pharmacy at this time.    MAEVE Dover.

## 2020-12-22 ENCOUNTER — TELEPHONE (OUTPATIENT)
Dept: FAMILY MEDICINE | Facility: CLINIC | Age: 35
End: 2020-12-22

## 2020-12-22 ENCOUNTER — VIRTUAL VISIT (OUTPATIENT)
Dept: FAMILY MEDICINE | Facility: CLINIC | Age: 35
End: 2020-12-22
Payer: COMMERCIAL

## 2020-12-22 DIAGNOSIS — K76.0 NAFLD (NONALCOHOLIC FATTY LIVER DISEASE): ICD-10-CM

## 2020-12-22 DIAGNOSIS — Z13.6 CARDIOVASCULAR SCREENING; LDL GOAL LESS THAN 100: ICD-10-CM

## 2020-12-22 DIAGNOSIS — R80.9 MICROALBUMINURIA DUE TO TYPE 2 DIABETES MELLITUS (H): ICD-10-CM

## 2020-12-22 DIAGNOSIS — E11.29 MICROALBUMINURIA DUE TO TYPE 2 DIABETES MELLITUS (H): ICD-10-CM

## 2020-12-22 PROCEDURE — 99213 OFFICE O/P EST LOW 20 MIN: CPT | Mod: 95 | Performed by: NURSE PRACTITIONER

## 2020-12-22 NOTE — TELEPHONE ENCOUNTER
The patient missed a call for her video visit today.  She is available now, please call again at [phone].  Thank you.  Kay Randolph,

## 2020-12-22 NOTE — PROGRESS NOTES
"Jojo Mccallum is a 35 year old female who is being evaluated via a billable video visit.      The patient has been notified of following:     \"This video visit will be conducted via a call between you and your physician/provider. We have found that certain health care needs can be provided without the need for an in-person physical exam.  This service lets us provide the care you need with a video conversation.  If a prescription is necessary we can send it directly to your pharmacy.  If lab work is needed we can place an order for that and you can then stop by our lab to have the test done at a later time.    Video visits are billed at different rates depending on your insurance coverage.  Please reach out to your insurance provider with any questions.    If during the course of the call the physician/provider feels a video visit is not appropriate, you will not be charged for this service.\"    Patient has given verbal consent for Video visit? Yes  How would you like to obtain your AVS? MyChart  If you are dropped from the video visit, the video invite should be resent to: see demographics  Will anyone else be joining your video visit? No      Subjective     Jojo Mccallum is a 35 year old female who presents today via video visit for the following health issues:    HPI            Video Start Time: 4:12 pm    Pleasant 35-year-old female initiated a virtual visit to request refill of her Ozempic.  She takes 1 mg every Sunday.  She reports she is also taking her Metformin and lisinopril.  She reports her blood sugars have been controlled.  She is due for labs.  She is currently in [place] and will be back after the first of the year.  Denies any symptoms of hyper or hypoglycemia.  Feeling well.    Review of Systems   Constitutional, HEENT, cardiovascular, pulmonary, GI, , musculoskeletal, neuro, skin, endocrine and psych systems are negative, except as otherwise noted.      Objective           Vitals:  No " "vitals were obtained today due to virtual visit.    Physical Exam     GENERAL: Healthy, alert and no distress  EYES: Eyes grossly normal to inspection.  No discharge or erythema, or obvious scleral/conjunctival abnormalities.  RESP: No audible wheeze, cough, or visible cyanosis.  No visible retractions or increased work of breathing.    SKIN: Visible skin clear. No significant rash, abnormal pigmentation or lesions.  NEURO: Cranial nerves grossly intact.  Mentation and speech appropriate for age.  PSYCH: Mentation appears normal, affect normal/bright, judgement and insight intact, normal speech and appearance well-groomed.              Assessment & Plan     Diabetes mellitus type 2, uncontrolled, with complications (H)  - semaglutide (OZEMPIC) 2 MG/1.5ML pen  Dispense: 2 pen; Refill: 2  - **Comprehensive metabolic panel FUTURE anytime  - **A1C FUTURE anytime    Microalbuminuria due to type 2 diabetes mellitus (H)  - Albumin Random Urine Quantitative with Creat Ratio    NAFLD (nonalcoholic fatty liver disease)  - **Comprehensive metabolic panel FUTURE anytime    CARDIOVASCULAR SCREENING; LDL GOAL LESS THAN 100  - Lipid panel reflex to direct LDL Fasting    Medication refilled.  She is tolerating well without side effects.  She is overdue for labs.  She understands this and states she will schedule after the first of the year when she is back in town.  Future lab orders placed.    BMI:   Estimated body mass index is 36.15 kg/m  as calculated from the following:    Height as of 4/22/20: 1.676 m (5' 6\").    Weight as of 5/4/20: 101.6 kg (224 lb).   Weight management plan: Discussed healthy diet and exercise guidelines         See Patient Instructions    The benefits, risks and potential side effects were discussed in detail. Black box warnings discussed as relevant. All patient questions were answered. The patient was instructed to follow up immediately if any adverse reactions develop.    Return precautions discussed, " including when to seek urgent/emergent care.    Patient verbalizes understanding and agrees with plan of care. Patient stable for discharge.      Return in about 2 weeks (around 1/5/2021) for Lab Work.    KELL Vitale CNP  [facility]      Video-Visit Details    Type of service:  Video Visit    Video End Time:4:18 pm    Originating Location (pt. Location): Car with someone else driving    Distant Location (provider location):  [facility]     Platform used for Video Visit: [employer]

## 2021-01-03 DIAGNOSIS — F41.8 DEPRESSION WITH ANXIETY: ICD-10-CM

## 2021-01-04 ENCOUNTER — HEALTH MAINTENANCE LETTER (OUTPATIENT)
Age: 36
End: 2021-01-04

## 2021-01-05 NOTE — TELEPHONE ENCOUNTER
PHQ 2/10/2020   PHQ-9 Total Score 10   Q9: Thoughts of better off dead/self-harm past 2 weeks Not at all     Routing refill request to provider for review/approval because:  PHQ-9 is 5 or more. Per protocol, RN cannot refill if PHQ-9 is over 4.        Susana Unger RN, BSN, PHN

## 2021-01-06 ENCOUNTER — VIRTUAL VISIT (OUTPATIENT)
Dept: FAMILY MEDICINE | Facility: CLINIC | Age: 36
End: 2021-01-06
Payer: COMMERCIAL

## 2021-01-06 DIAGNOSIS — J06.9 VIRAL URI WITH COUGH: Primary | ICD-10-CM

## 2021-01-06 PROCEDURE — 99213 OFFICE O/P EST LOW 20 MIN: CPT | Mod: 95 | Performed by: PHYSICIAN ASSISTANT

## 2021-01-06 NOTE — LETTER
January 6, 2021      Jojo BROWN Mccallum  [address]   [place] [address]        To Whom It May Concern,     Please allow Jojo to work tomorrow 1/7/2021 without restrictions.  She should be excused from work Monday January 4 through 1/6/2021 due to illness.       Sincerely,        Perla Romero PA-C

## 2021-01-06 NOTE — PATIENT INSTRUCTIONS
Take ibuprofen as needed for pain.  Continue zicam as needed if helpful  Return urgently if any change in symptoms like increasing pain, fever, vomiting or other change in symptoms.  You may return to work tomorrow without restrictions.  Follow up with us in clinic if symptoms not improving over the next 5 days

## 2021-01-06 NOTE — PROGRESS NOTES
Jojo Mccallum is a 35 year old female who is being evaluated via a billable video visit.      How would you like to obtain your AVS? MyChart  If the video visit is dropped, the invitation should be resent by:   Will anyone else be joining your video visit? No      Video Start Time: 358 pm  Assessment & Plan     Viral URI with cough  Symptoms improving.  Negative covid test earlier this week.  Encouraged symptomatic cares and follow up with us in person if symptoms not improving or urgently if any change in symptoms.                 7 minutes spent on the date of the encounter doing patient visit          Tobacco Cessation:   reports that she has been smoking cigarettes. She has been smoking about 0.10 packs per day. She has never used smokeless tobacco.  Tobacco Cessation Action Plan: Information offered: Patient not interested at this time      Patient Instructions   Take ibuprofen as needed for pain.  Continue zicam as needed if helpful  Return urgently if any change in symptoms like increasing pain, fever, vomiting or other change in symptoms.  You may return to work tomorrow without restrictions.  Follow up with us in clinic if symptoms not improving over the next 5 days       No follow-ups on file.    Perla Romero PA-C  [facility]     Jojo Mccallum is a 35 year old who presents to clinic today for the following health issues   I have been out of work for 3 days.  Traveled to [place] for [employer] levels were very high and believes was sick with allergies  Sunday evening (3 days ago) ear started to hurt and throat pain under ear on and off.  Wakes up constantly and sweaty and fan in room.  Taking zicam and helpful and still wake up ears sore and achey.    Symptoms improved in afternoon. Overnight sleeping a lot   Slept till 11 am today.  Ear pain is Not as sharp pain as ear infection.  Coughing a lot.  Did take covid test on Monday night (2  days ago) wand was negative.    Sore throat improving.    Voice is tiny difference.  Coughing lingering   Works as a  for place in [place]   Haven't checked blood sugars lately.  Does need to make appointment for diabetes check and labs  HPI             Review of Systems   Constitutional, HEENT, cardiovascular, pulmonary, gi and gu systems are negative, except as otherwise noted.      Objective           Vitals:  No vitals were obtained today due to virtual visit.    Physical Exam   GENERAL: Healthy, alert and no distress  EYES: Eyes grossly normal to inspection.  No discharge or erythema, or obvious scleral/conjunctival abnormalities.  RESP: No audible wheeze, or visible cyanosis.  Has a dry cough.  No visible retractions or increased work of breathing.    SKIN: Visible skin clear. No significant rash, abnormal pigmentation or lesions.  NEURO: Cranial nerves grossly intact.  Mentation and speech appropriate for age.  PSYCH: Mentation appears normal, affect normal/bright, judgement and insight intact, normal speech and appearance well-groomed.                Video-Visit Details    Type of service:  Video Visit    Video End Time:4:05 PM    Originating Location (pt. Location): Home    Distant Location (provider location):  [facility]     Platform used for Video Visit: [employer]

## 2021-03-24 ENCOUNTER — VIRTUAL VISIT (OUTPATIENT)
Dept: FAMILY MEDICINE | Facility: CLINIC | Age: 36
End: 2021-03-24
Payer: COMMERCIAL

## 2021-03-24 DIAGNOSIS — J06.9 UPPER RESPIRATORY TRACT INFECTION, UNSPECIFIED TYPE: ICD-10-CM

## 2021-03-24 DIAGNOSIS — R19.7 VOMITING AND DIARRHEA: ICD-10-CM

## 2021-03-24 DIAGNOSIS — R11.10 VOMITING AND DIARRHEA: ICD-10-CM

## 2021-03-24 DIAGNOSIS — J06.9 UPPER RESPIRATORY TRACT INFECTION, UNSPECIFIED TYPE: Primary | ICD-10-CM

## 2021-03-24 LAB
LABORATORY COMMENT REPORT: NORMAL
SARS-COV-2 RNA RESP QL NAA+PROBE: NEGATIVE
SARS-COV-2 RNA RESP QL NAA+PROBE: NORMAL
SPECIMEN SOURCE: NORMAL
SPECIMEN SOURCE: NORMAL

## 2021-03-24 PROCEDURE — 99213 OFFICE O/P EST LOW 20 MIN: CPT | Mod: 95 | Performed by: PHYSICIAN ASSISTANT

## 2021-03-24 PROCEDURE — 87635 SARS-COV-2 COVID-19 AMP PRB: CPT | Performed by: PHYSICIAN ASSISTANT

## 2021-03-24 RX ORDER — ONDANSETRON 4 MG/1
4 TABLET, ORALLY DISINTEGRATING ORAL EVERY 8 HOURS PRN
Qty: 10 TABLET | Refills: 0 | Status: SHIPPED | OUTPATIENT
Start: 2021-03-24 | End: 2021-05-25

## 2021-03-24 NOTE — PROGRESS NOTES
Jojo is a 35 year old who is being evaluated via a billable video visit.      How would you like to obtain your AVS? MyChart  If the video visit is dropped, the invitation should be resent by: Text to cell phone: [phone]  Will anyone else be joining your video visit? No    Video Start Time: 8:20 AM    Assessment & Plan   Problem List Items Addressed This Visit     None      Visit Diagnoses     Upper respiratory tract infection, unspecified type    -  Primary    Relevant Orders    Symptomatic COVID-19 Virus (Coronavirus) by PCR    Vomiting and diarrhea        Relevant Medications    ondansetron (ZOFRAN-ODT) 4 MG ODT tab    Other Relevant Orders    Symptomatic COVID-19 Virus (Coronavirus) by PCR         Zofran 4 mg every 6-8 hours as needed for vomiting and diarrhea  Increase water intake  Rest  May take Imodium for diarrhea as needed   Call [address] [place] [address] to schedule Covid test    If develop worsening vomiting  Follow up in UC or ED    12 minutes spent on the date of the encounter doing chart review, history and exam, documentation and further activities as noted above           Return in about 5 days (around 3/29/2021), or if symptoms worsen or fail to improve.    AMANDA Baptiste [facility]    Jesus Uribe is a 35 year old who presents for the following health issues     HPI     Diarrhea  Onset/Duration: 1 day ago  Description:       Consistency of stool: watery       Blood in stool: no       Number of loose stools past 24 hours: 12+  Progression of Symptoms: same and constant  Accompanying signs and symptoms:       Fever: no       Nausea/Vomiting: YES       Abdominal pain: YES, cramping with diarrhea       Weight loss: no       Episodes of constipation: no  History   Ill contacts: no  Recent use of antibiotics: no  Recent travels: YES  Recent medication-new or changes(Rx or OTC): no  Precipitating or alleviating factors: None  Therapies tried and outcome:  PeptoBismol    Patient also has runny nose for several days. She also has mild cough but she though it was from Lisinopril.     Review of Systems   Constitutional, HEENT, cardiovascular, pulmonary, gi and gu systems are negative, except as otherwise noted.      Objective           Vitals:  No vitals were obtained today due to virtual visit.    Physical Exam   GENERAL: Healthy, alert and no distress  EYES: Eyes grossly normal to inspection.  No discharge or erythema, or obvious scleral/conjunctival abnormalities.  RESP: No audible wheeze, cough, or visible cyanosis.  No visible retractions or increased work of breathing.    SKIN: Visible skin clear. No significant rash, abnormal pigmentation or lesions.  NEURO: Cranial nerves grossly intact.  Mentation and speech appropriate for age.  PSYCH: Mentation appears normal, affect normal/bright, judgement and insight intact, normal speech and appearance well-groomed.                Video-Visit Details    Type of service:  Video Visit    Video End Time:8:32 AM    Originating Location (pt. Location): Home    Distant Location (provider location):  [facility]     Platform used for Video Visit: [employer]

## 2021-03-24 NOTE — PROGRESS NOTES
COVID-19 PCR test completed. Patient handout For Patients Who Have Been Tested for Covid-19 (Coronavirus) was given to the patient, which includes test result notification process.     Alert and oriented to person, place and time

## 2021-03-24 NOTE — PATIENT INSTRUCTIONS
At [facility], we strive to deliver an exceptional experience to you, every time we see you. If you receive a survey, please complete it as we do value your feedback.  If you have MyChart, you can expect to receive results automatically within 24 hours of their completion.  Your provider will send a note interpreting your results as well.   If you do not have MyChart, you should receive your results in about a week by mail.    Your care team:                            Family Medicine Internal Medicine   MD Branden Espinoza MD Shantel Branch-Fleming, MD Srinivasa Vaka, MD Katya Belousova, PAVLADIMIR Cano, APRN CNP    Jeremy Lindsay, MD Pediatrics   Neeraj Osborne, PAVLADIMIR Boyle, CNP MD Viviana Huang APRN CNP   MD Donna Andujar MD Deborah Mielke, MD Daija Hare, APRN [facility]      Clinic hours: Monday - Thursday 7 am-6 pm; Fridays 7 am-5 pm.   Urgent care: Monday - Friday 11 am-9 pm; Saturday and Sunday 9 am-5 pm.    Clinic: [phone]       [facility]: Monday - Thursday 8 am - 7 pm; Friday 8 am - 6 pm  [facility]: [phone]     Use [email or web address] for 24/7 diagnosis and treatment of dozens of conditions.

## 2021-04-08 DIAGNOSIS — R80.9 MICROALBUMINURIA DUE TO TYPE 2 DIABETES MELLITUS (H): ICD-10-CM

## 2021-04-08 DIAGNOSIS — E11.29 MICROALBUMINURIA DUE TO TYPE 2 DIABETES MELLITUS (H): ICD-10-CM

## 2021-04-08 DIAGNOSIS — Z13.6 CARDIOVASCULAR SCREENING; LDL GOAL LESS THAN 100: ICD-10-CM

## 2021-04-08 DIAGNOSIS — K76.0 NAFLD (NONALCOHOLIC FATTY LIVER DISEASE): ICD-10-CM

## 2021-04-08 LAB
ALBUMIN SERPL-MCNC: 3.9 G/DL (ref 3.4–5)
ALP SERPL-CCNC: 76 U/L (ref 40–150)
ALT SERPL W P-5'-P-CCNC: 68 U/L (ref 0–50)
ANION GAP SERPL CALCULATED.3IONS-SCNC: 7 MMOL/L (ref 3–14)
AST SERPL W P-5'-P-CCNC: 38 U/L (ref 0–45)
BILIRUB SERPL-MCNC: 0.9 MG/DL (ref 0.2–1.3)
BUN SERPL-MCNC: 12 MG/DL (ref 7–30)
CALCIUM SERPL-MCNC: 9.2 MG/DL (ref 8.5–10.1)
CHLORIDE SERPL-SCNC: 102 MMOL/L (ref 94–109)
CHOLEST SERPL-MCNC: 228 MG/DL
CO2 SERPL-SCNC: 24 MMOL/L (ref 20–32)
CREAT SERPL-MCNC: 0.64 MG/DL (ref 0.52–1.04)
CREAT UR-MCNC: 338 MG/DL
GFR SERPL CREATININE-BSD FRML MDRD: >90 ML/MIN/{1.73_M2}
GLUCOSE SERPL-MCNC: 278 MG/DL (ref 70–99)
HBA1C MFR BLD: 10 % (ref 0–5.6)
HDLC SERPL-MCNC: 43 MG/DL
LDLC SERPL CALC-MCNC: 130 MG/DL
MICROALBUMIN UR-MCNC: 83 MG/L
MICROALBUMIN/CREAT UR: 24.56 MG/G CR (ref 0–25)
NONHDLC SERPL-MCNC: 185 MG/DL
POTASSIUM SERPL-SCNC: 3.8 MMOL/L (ref 3.4–5.3)
PROT SERPL-MCNC: 8 G/DL (ref 6.8–8.8)
SODIUM SERPL-SCNC: 133 MMOL/L (ref 133–144)
TRIGL SERPL-MCNC: 277 MG/DL

## 2021-04-08 PROCEDURE — 80061 LIPID PANEL: CPT | Performed by: NURSE PRACTITIONER

## 2021-04-08 PROCEDURE — 36415 COLL VENOUS BLD VENIPUNCTURE: CPT | Performed by: NURSE PRACTITIONER

## 2021-04-08 PROCEDURE — 80053 COMPREHEN METABOLIC PANEL: CPT | Performed by: NURSE PRACTITIONER

## 2021-04-08 PROCEDURE — 83036 HEMOGLOBIN GLYCOSYLATED A1C: CPT | Performed by: NURSE PRACTITIONER

## 2021-04-08 PROCEDURE — 82043 UR ALBUMIN QUANTITATIVE: CPT | Performed by: NURSE PRACTITIONER

## 2021-04-25 ENCOUNTER — HEALTH MAINTENANCE LETTER (OUTPATIENT)
Age: 36
End: 2021-04-25

## 2021-05-19 DIAGNOSIS — F41.8 DEPRESSION WITH ANXIETY: ICD-10-CM

## 2021-05-19 NOTE — TELEPHONE ENCOUNTER
"Routing refill request to provider for review/approval because:  Failed protocol.  No future appointment scheduled. Please advise. Thank you. Brooke Frost R.N.  Requested Prescriptions   Pending Prescriptions Disp Refills    sertraline (ZOLOFT) 50 MG tablet 90 tablet 0     Sig: Take 1 tablet (50 mg) by mouth daily       SSRIs Protocol Failed - 5/19/2021 12:02 PM        Failed - PHQ-9 score less than 5 in past 6 months     Please review last PHQ-9 score.           Passed - Medication is active on med list        Passed - Patient is age 18 or older        Passed - No active pregnancy on record        Passed - No positive pregnancy test in last 12 months        Passed - Recent (6 mo) or future (30 days) visit within the authorizing provider's specialty     Patient had office visit in the last 6 months or has a visit in the next 30 days with authorizing provider or within the authorizing provider's specialty.  See \"Patient Info\" tab in inbasket, or \"Choose Columns\" in Meds & Orders section of the refill encounter.                       "

## 2021-05-25 ENCOUNTER — OFFICE VISIT (OUTPATIENT)
Dept: OBGYN | Facility: CLINIC | Age: 36
End: 2021-05-25
Payer: COMMERCIAL

## 2021-05-25 VITALS
DIASTOLIC BLOOD PRESSURE: 85 MMHG | SYSTOLIC BLOOD PRESSURE: 139 MMHG | HEART RATE: 91 BPM | HEIGHT: 66 IN | OXYGEN SATURATION: 98 % | TEMPERATURE: 97.2 F | WEIGHT: 247 LBS | BODY MASS INDEX: 39.7 KG/M2

## 2021-05-25 DIAGNOSIS — Z30.46 ENCOUNTER FOR REMOVAL AND REINSERTION OF NEXPLANON: Primary | ICD-10-CM

## 2021-05-25 LAB — HCG UR QL: NEGATIVE

## 2021-05-25 PROCEDURE — 11983 REMOVE/INSERT DRUG IMPLANT: CPT | Performed by: NURSE PRACTITIONER

## 2021-05-25 PROCEDURE — 81025 URINE PREGNANCY TEST: CPT | Performed by: NURSE PRACTITIONER

## 2021-05-25 ASSESSMENT — PAIN SCALES - GENERAL: PAINLEVEL: NO PAIN (0)

## 2021-05-25 ASSESSMENT — MIFFLIN-ST. JEOR: SCORE: 1842.13

## 2021-05-25 NOTE — PROGRESS NOTES
"Jojo Mccallum is a 35 year old female  No LMP recorded. Patient has had an implant. After 3+ years of Nexplanon as a contraceptive, she was here today for its removal. Would like a new implant inserted today as well. This was due to be removed in February. We reviewed the nexplanon as a sub-dermal implant effectiveness for up to three years. Reviewed risk of migration of the implant into a vascular space and possibility of difficult removal, surgical removal.  We reviewed the mechanism of actions, goals and limitations of the use of the medication, as well as the contraindications.  Bleeding patterns were also reviewed with her. She voiced her understanding.  The patient and I reviewed nexplanon removal. Patient consent form is signed.    Patient medical, surgical, social, and family history reviewed and updated. ROS: 10 point ROS neg other than the symptoms noted above in the HPI.      /85 (BP Location: Right arm, Patient Position: Sitting, Cuff Size: Adult Large)   Pulse 91   Temp 97.2  F (36.2  C) (Tympanic)   Ht 1.676 m (5' 6\")   Wt 112 kg (247 lb)   SpO2 98%   BMI 39.87 kg/m     This is a well appearing female in no acute distress. Answers questions and maintains eye contact appropriately.   UPT negative    REMOVAL PROCEDURE:  Patient was placed in a supine position. Left arm was flexed at the elbow and externally rotated. The implant was located by palpation and marked at the end closest to the elbow with a sterile marker.     The area was cleaned and antiseptic applied. 1.5cc of 1% Lidocaine was injected just underneath the end of the implant closest to the elbow.  Light pressure was applied on the end of the implant closest to the elbow and a 2 mm incision was made in the arm at the tip of the implant closest to the elbow. The implant was gently pushed toward the incision until the tip was visible and was grasped with sterile mosquito foreceps and gently removed.    INSERTION PROCEDURE: "   With the patient in a supine position and the left arm flexed at the elbow and externally rotated, the insertion site was noted at 6 cm above the elbow crease at the inner side of the upper arm overlying the grove between the biceps and the triceps. This is at the site of the removal incision. A second primitivo was made 2 cm from the first to use as a guide. The area of the insertion site was prepped with Betadine. Lidocaine 1% was used along the planned insertion site. The nexplanon was removed from its pack. The implant was visually verified. The skin was stretched at the insertion site. The needle was inserted with beveled side up just underneath the skin. Tenting was undertaken while the insertion was being performed. The seal of the applicator was broken and the cannula was retracted. After the insertion, the implant was palpated by both myself and the patient. The betadine was removed from her arm. The incision was closed with a butterfly closure and an adhesive bandage applied. A sterile gauze with pressure bandage was also applied. Aseptic conditions were maintained throughout the procedure.    The patient was given aftercare instructions, her user card with the lot number. The patient tolerated the procedure well.  No apparent complications.    Lot#:[id number]  Expiration date:2023JUN01  NDC#: 3382-5301-18    Haleigh CASTORENA CNP

## 2021-05-28 NOTE — TELEPHONE ENCOUNTER
This writer attempted to contact patient on 05/28/21      Reason for call needs to be seen and left message.      If patient calls back:   Schedule Office Visit appointment PRN with primary care, document that pt called and close encounter         Lorin Carpenter MA

## 2021-05-28 NOTE — TELEPHONE ENCOUNTER
PA fax is received for medication Ozempic and is in provider's mail slot to address on Tuesday due to Holiday on Monday.  Jovanny Kirk (AKA:  Sejal), , [facility], Primary Care

## 2021-06-01 NOTE — TELEPHONE ENCOUNTER
Called, patient is scheduled.  Jovanny Kirk (AKA:  Sejal), , [facility], Primary Care

## 2021-06-01 NOTE — TELEPHONE ENCOUNTER
Plan does not cover semaglutide (OZEMPIC) 2 MG/1.5ML pen.  Please go to cover"[email or web address] to initiate Prior Auth or change med.    Insurance type and ID number: Key:  [id number]      Additional Information:     Shivani Grier

## 2021-06-01 NOTE — TELEPHONE ENCOUNTER
I received prior auth form for ozempic. Patient still has not made follow up appointment from lab appointment on 4/8 with a1c 10. Also, med list says she's taking metformin and it's listed as patient reported from 2019. I have never prescribed it for her. Her diabetes is poorly controlled based on most recent labs. Prior auth form asks questions regarding medications attempted and what else she's on. I cannot accurately fill out the form without a visit with her to discuss her diabetes.     Please call her to request her to schedule appointment for diabetes check and to complete paperwork

## 2021-06-02 ENCOUNTER — TELEPHONE (OUTPATIENT)
Dept: FAMILY MEDICINE | Facility: CLINIC | Age: 36
End: 2021-06-02

## 2021-06-02 NOTE — TELEPHONE ENCOUNTER
Plan does not cover semaglutide (OZEMPIC) 2 MG/1.5ML pen.  Please call [phone] to initiate Prior Auth or change med.    Insurance type and ID number: ID# [id number]      Additional Information:     Shivani Grier

## 2021-06-03 ENCOUNTER — TELEPHONE (OUTPATIENT)
Dept: FAMILY MEDICINE | Facility: CLINIC | Age: 36
End: 2021-06-03

## 2021-06-03 ENCOUNTER — OFFICE VISIT (OUTPATIENT)
Dept: FAMILY MEDICINE | Facility: CLINIC | Age: 36
End: 2021-06-03
Payer: COMMERCIAL

## 2021-06-03 VITALS
HEIGHT: 66 IN | WEIGHT: 247 LBS | DIASTOLIC BLOOD PRESSURE: 74 MMHG | BODY MASS INDEX: 39.7 KG/M2 | HEART RATE: 77 BPM | OXYGEN SATURATION: 97 % | SYSTOLIC BLOOD PRESSURE: 137 MMHG

## 2021-06-03 DIAGNOSIS — H26.9 CATARACT OF RIGHT EYE, UNSPECIFIED CATARACT TYPE: ICD-10-CM

## 2021-06-03 DIAGNOSIS — Z01.818 PREOP GENERAL PHYSICAL EXAM: Primary | ICD-10-CM

## 2021-06-03 DIAGNOSIS — H44.113 PANUVEITIS OF BOTH EYES: ICD-10-CM

## 2021-06-03 DIAGNOSIS — E66.01 MORBID OBESITY (H): ICD-10-CM

## 2021-06-03 DIAGNOSIS — F41.8 DEPRESSION WITH ANXIETY: ICD-10-CM

## 2021-06-03 LAB
ANION GAP SERPL CALCULATED.3IONS-SCNC: 5 MMOL/L (ref 3–14)
BUN SERPL-MCNC: 13 MG/DL (ref 7–30)
CALCIUM SERPL-MCNC: 9.3 MG/DL (ref 8.5–10.1)
CHLORIDE SERPL-SCNC: 101 MMOL/L (ref 94–109)
CO2 SERPL-SCNC: 28 MMOL/L (ref 20–32)
CREAT SERPL-MCNC: 0.63 MG/DL (ref 0.52–1.04)
GFR SERPL CREATININE-BSD FRML MDRD: >90 ML/MIN/{1.73_M2}
GLUCOSE SERPL-MCNC: 319 MG/DL (ref 70–99)
HBA1C MFR BLD: 10.2 % (ref 0–5.6)
POTASSIUM SERPL-SCNC: 4.1 MMOL/L (ref 3.4–5.3)
SODIUM SERPL-SCNC: 134 MMOL/L (ref 133–144)

## 2021-06-03 PROCEDURE — 80048 BASIC METABOLIC PNL TOTAL CA: CPT | Performed by: NURSE PRACTITIONER

## 2021-06-03 PROCEDURE — 83036 HEMOGLOBIN GLYCOSYLATED A1C: CPT | Performed by: NURSE PRACTITIONER

## 2021-06-03 PROCEDURE — 36415 COLL VENOUS BLD VENIPUNCTURE: CPT | Performed by: NURSE PRACTITIONER

## 2021-06-03 PROCEDURE — 99214 OFFICE O/P EST MOD 30 MIN: CPT | Performed by: NURSE PRACTITIONER

## 2021-06-03 RX ORDER — LISINOPRIL 2.5 MG/1
2.5 TABLET ORAL DAILY
Qty: 90 TABLET | Refills: 3 | Status: SHIPPED | OUTPATIENT
Start: 2021-06-03 | End: 2022-03-11 | Stop reason: SINTOL

## 2021-06-03 ASSESSMENT — PAIN SCALES - GENERAL: PAINLEVEL: NO PAIN (0)

## 2021-06-03 ASSESSMENT — MIFFLIN-ST. JEOR: SCORE: 1842.13

## 2021-06-03 NOTE — TELEPHONE ENCOUNTER
semaglutide (OZEMPIC) 2 MG/1.5ML pen 9 mL 1 6/3/2021     Pharmacy requesting PA for medication. Plan does not cover this medication. Please call plan at [phone] to initiate PA or call/fax pharmacy to change medication. Patient ID # is [id number].

## 2021-06-03 NOTE — PROGRESS NOTES
[address] [address]  Phone: [phone]  Primary Provider: Elvia Crum  Pre-op Performing Provider: ELVIA CRUM      PREOPERATIVE EVALUATION:  Today's date: 6/3/2021    Jojo Mccallum is a 35 year old female who presents for a preoperative evaluation.    Surgical Information:  Surgery/Procedure: Right Cataract  Surgery Location: [facility]  Surgeon: Dr. Samir Phelps  Surgery Date: 6/18/21  Time of Surgery: unsure  Where patient plans to recover: At home with family  Fax number for surgical facility: TBD    Type of Anesthesia Anticipated: to be determined    Assessment & Plan     The proposed surgical procedure is considered LOW risk.    Preop general physical exam  - Basic metabolic panel  (Ca, Cl, CO2, Creat, Gluc, K, Na, BUN)  - Hemoglobin A1c    Cataract of right eye, unspecified cataract type  Panuveitis of both eyes      Diabetes mellitus type 2, uncontrolled, with complications (H)  Poorly controlled. Restarting medications that she's been off of due to lack of insurance.  - lisinopril (ZESTRIL) 2.5 MG tablet; Take 1 tablet (2.5 mg) by mouth daily  - metFORMIN (GLUCOPHAGE) 500 MG tablet; Take 2 tablets (1,000 mg) by mouth 2 times daily (with meals)  - semaglutide (OZEMPIC) 2 MG/1.5ML pen; Inject 1 mg Subcutaneous every 7 days  - Basic metabolic panel  (Ca, Cl, CO2, Creat, Gluc, K, Na, BUN)  - Hemoglobin A1c    Depression with anxiety  Patient was taking 100 mg. Increasing to 150 mg. Follow up 4 weeks. No thoughts to harm self or others. Feels safe.  - sertraline (ZOLOFT) 50 MG tablet; Take 3 tablets (150 mg) by mouth daily    Morbid obesity (H)  Diet/exercise.           Risks and Recommendations:  The patient has the following additional risks and recommendations for perioperative complications:  Diabetes:  - Patient is not on insulin therapy: regular NPO guidelines can be followed.      Medication Instructions:  Patient is to take all scheduled medications on the day of surgery EXCEPT for modifications listed below:   - ACE/ARB: May be continued on the day of surgery.    - metformin: HOLD day of surgery.   - GLP-1 Injectable (exenitide, liraglutide, semaglutide, dulaglutide, etc.): HOLD day of surgery     RECOMMENDATION:  Diabetes is poorly controlled. I discussed risks of poorly controlled diabetes and healing with patient.   I will call surgery center to alert them to a1c and poorly controlled diabetes. Aside from her diabetes, she is an acceptable candidate for surgery.  }    Subjective     HPI related to upcoming procedure: Decreased vision in right eye from cataract and panuveitis.    Preop Questions 6/3/2021   1. Have you ever had a heart attack or stroke? No   2. Have you ever had surgery on your heart or blood vessels, such as a stent placement, a coronary artery bypass, or surgery on an artery in your head, neck, heart, or legs? No   3. Do you have chest pain with activity? No   4. Do you have a history of  heart failure? No   5. Do you currently have a cold, bronchitis or symptoms of other infection? No   6. Do you have a cough, shortness of breath, or wheezing? No   7. Do you or anyone in your family have previous history of blood clots? No   8. Do you or does anyone in your family have a serious bleeding problem such as prolonged bleeding following surgeries or cuts? No   9. Have you ever had problems with anemia or been told to take iron pills? No   10. Have you had any abnormal blood loss such as black, tarry or bloody stools, or abnormal vaginal bleeding? No   11. Have you ever had a blood transfusion? No   12. Are you willing to have a blood transfusion if it is medically needed before, during, or after your surgery? Yes   13. Have you or any of your relatives ever had problems with anesthesia? No   14. Do you have sleep apnea, excessive snoring or daytime drowsiness? No   15.  Do you have any artifical heart valves or other implanted medical devices like a pacemaker, defibrillator, or continuous glucose monitor? No   16. Do you have artificial joints? No   17. Are you allergic to latex? No   18. Is there any chance that you may be pregnant? No       Health Care Directive:  Patient does not have a Health Care Directive or Living Will: Discussed advance care planning with patient; however, patient declined at this time.    Preoperative Review of :   reviewed - no record of controlled substances prescribed.      Status of Chronic Conditions:  DEPRESSION/ANXIETY - Patient has a long history of Depression/Anxiety of moderate severity requiring medication for control with recent symptoms being gradually worsening..Current symptoms of depression include depressed mood, anxiety. Has been taking sertraline 100 mg. Requested dose increase to 150 mg.    DIABETES - Patient has a longstanding history of DiabetesType Type II . Patient is being treated with diet, oral agents, exercise and SMBG and denies significant side effects. Control has been poor. Complicating factors include but are not limited to: morbid obesity . Didn't have insurance for several months and was only taking ozempic and sometimes metformin 1000 mg once daily. Just got new job and new insurance.      Review of Systems except as noted above  CONSTITUTIONAL: NEGATIVE for fever, chills, change in weight  INTEGUMENTARY/SKIN: NEGATIVE for worrisome rashes, moles or lesions  EYES: NEGATIVE for vision changes or irritation  ENT/MOUTH: NEGATIVE for ear, mouth and throat problems  RESP: NEGATIVE for significant cough or SOB  BREAST: NEGATIVE for masses, tenderness or discharge  CV: NEGATIVE for chest pain, palpitations or peripheral edema  GI: NEGATIVE for nausea, abdominal pain, heartburn, or change in bowel habits  : NEGATIVE for frequency, dysuria, or hematuria  MUSCULOSKELETAL: NEGATIVE for significant arthralgias or  myalgia  NEURO: NEGATIVE for weakness, dizziness or paresthesias  ENDOCRINE: NEGATIVE for temperature intolerance, skin/hair changes  HEME: NEGATIVE for bleeding problems  PSYCHIATRIC: NEGATIVE for changes in mood or affect    Patient Active Problem List    Diagnosis Date Noted     PCOS (polycystic ovarian syndrome) 2020     Priority: Medium     Obesity (BMI 35.0-39.9) with comorbidity (H) 02/10/2020     Priority: Medium     NAFLD (nonalcoholic fatty liver disease) 2019     Priority: Medium     Diabetes mellitus type 2, uncontrolled, with complications (H) 2019     Priority: Medium     Microalbuminuria due to type 2 diabetes mellitus (H) 2019     Priority: Medium     Panuveitis of both eyes 2018     Priority: Medium     Gets steroid injections every 2-3 months. Supplements with prednisone eye drops.       Depression with anxiety 2015     Priority: Medium     Obesity (BMI 35.0-39.9 without comorbidity) 2014     Priority: Medium      Past Medical History:   Diagnosis Date     Depressive disorder      Diabetes (H)      Nexplanon in place 2021    Left     Past Surgical History:   Procedure Laterality Date      SECTION       Current Outpatient Medications   Medication Sig Dispense Refill     ACCU-CHEK GUIDE test strip U TO TEST TID       blood glucose monitoring (ACCU-CHEK FASTCLIX) lancets U UTD BID       etonogestrel (NEXPLANON) 68 MG IMPL 1 each by Subdermal route once       lisinopril (PRINIVIL/ZESTRIL) 2.5 MG tablet Take 2.5 mg by mouth       metFORMIN (GLUCOPHAGE) 500 MG tablet Take 1,000 mg by mouth 2 times daily (with meals)       semaglutide (OZEMPIC) 2 MG/1.5ML pen Inject 1 mg Subcutaneous every 7 days 3 mL 1     sertraline (ZOLOFT) 50 MG tablet Take 1 tablet (50 mg) by mouth daily 90 tablet 0       No Known Allergies     Social History     Tobacco Use     Smoking status: Current Some Day Smoker     Packs/day: 0.10     Types: Cigarettes     Smokeless  "tobacco: Never Used   Substance Use Topics     Alcohol use: Not Currently     Family History   Problem Relation Age of Onset     Diabetes Mother      Obesity Mother      Kidney Disease Father      Dialysis Father      Obesity Brother      Diabetes Maternal Grandmother      Cerebrovascular Disease Maternal Grandmother         stroke     Obesity Brother      History   Drug Use Unknown         Objective     /74 (BP Location: Left arm, Patient Position: Chair, Cuff Size: Adult Large)   Pulse 77   Ht 1.676 m (5' 6\")   Wt 112 kg (247 lb)   SpO2 97%   BMI 39.87 kg/m      Physical Exam    GENERAL APPEARANCE: healthy, alert and no distress     EYES: EOMI, PERRL     HENT: ear canals and TM's normal and nose and mouth without ulcers or lesions     NECK: no adenopathy, no asymmetry, masses, or scars and thyroid normal to palpation     RESP: lungs clear to auscultation - no rales, rhonchi or wheezes     CV: regular rates and rhythm, normal S1 S2, no S3 or S4 and no murmur, click or rub     ABDOMEN:  soft, nontender, obese abdomen     MS: extremities normal- no gross deformities noted, no evidence of inflammation in joints, FROM in all extremities.     SKIN: no suspicious lesions or rashes     NEURO: Normal strength and tone, sensory exam grossly normal, mentation intact and speech normal     PSYCH: mentation appears normal. and affect normal/bright     LYMPHATICS: No cervical adenopathy  Diabetic foot exam: normal DP and PT pulses, no trophic changes or ulcerative lesions and normal sensory exam      Recent Labs   Lab Test 04/08/21  0812 02/10/20  1709    139   POTASSIUM 3.8 4.2   CR 0.64 0.54   A1C 10.0* 7.0*        Diagnostics:  Recent Results (from the past 168 hour(s))   Basic metabolic panel  (Ca, Cl, CO2, Creat, Gluc, K, Na, BUN)    Collection Time: 06/03/21  7:55 AM   Result Value Ref Range    Sodium 134 133 - 144 mmol/L    Potassium 4.1 3.4 - 5.3 mmol/L    Chloride 101 94 - 109 mmol/L    Carbon Dioxide " 28 20 - 32 mmol/L    Anion Gap 5 3 - 14 mmol/L    Glucose 319 (H) 70 - 99 mg/dL    Urea Nitrogen 13 7 - 30 mg/dL    Creatinine 0.63 0.52 - 1.04 mg/dL    GFR Estimate >90 >60 mL/min/[1.73_m2]    GFR Estimate If Black >90 >60 mL/min/[1.73_m2]    Calcium 9.3 8.5 - 10.1 mg/dL   Hemoglobin A1c    Collection Time: 06/03/21  7:55 AM   Result Value Ref Range    Hemoglobin A1C 10.2 (H) 0 - 5.6 %      No EKG required, no history of coronary heart disease, significant arrhythmia, peripheral arterial disease or other structural heart disease.    Revised Cardiac Risk Index (RCRI):  The patient has the following serious cardiovascular risks for perioperative complications:   - No serious cardiac risks = 0 points     RCRI Interpretation: 0 points: Class I (very low risk - 0.4% complication rate)           Signed Electronically by: KELL Vitale CNP  Copy of this evaluation report is provided to requesting physician.

## 2021-06-03 NOTE — TELEPHONE ENCOUNTER
I called [facility] to notify them of a1c 10.2. I saw Jojo today for a pre-op for cataract surgery later this month.    I talked with Amaya, surgery scheduler, who requested pre-op be faxed to [phone] as well as the surgery center at [phone]. She will run it by Jojo's surgeon next week and they will decide to proceed with surgery now or wait until her diabetes is better controlled.

## 2021-06-03 NOTE — PROGRESS NOTES
Faxed signed Prior Auth form for the Ozempic Pen injectors to [facility] Review Agent, [phone], right fax confirmed at 10:27 am today, 6/3/2021. Placed in TC copy basket. MA to follow up on PA response.  Mya Corbett MA  [facility]  2nd Floor  Primary Care

## 2021-06-03 NOTE — PATIENT INSTRUCTIONS
At [facility], we strive to deliver an exceptional experience to you, every time we see you. If you receive a survey, please complete it as we do value your feedback.  If you have MyChart, you can expect to receive results automatically within 24 hours of their completion.  Your provider will send a note interpreting your results as well.   If you do not have MyChart, you should receive your results in about a week by mail.    Your care team:                            Family Medicine Internal Medicine   MD Branden Espinoza MD Shantel Branch-Fleming, MD Srinivasa Vaka, MD Katya Belousova, PAVLADIMIR Cano, APRN CNP    Jeremy Lindsay, MD Pediatrics   Neeraj Osborne, PAVLADIMIR Boyle, CNP MD Viviana Huang APRN CNP   MD Donna Andujar MD Deborah Mielke, MD Daija Hare, APRN [facility]      Clinic hours: Monday - Thursday 7 am-6 pm; Fridays 7 am-5 pm.   Urgent care: Monday - Friday 10 am- 8 pm; Saturday and Sunday 9 am-5 pm.    Clinic: [phone]       [facility]: Monday - Thursday 8 am - 7 pm; Friday 8 am - 6 pm  [facility]: [phone]     Use [email or web address] for 24/7 diagnosis and treatment of dozens of conditions.    Preparing for Your Surgery  Getting started  A nurse will call you to review your health history and instructions. They will give you an arrival time based on your scheduled surgery time.  Please be ready to share the following:    Your doctor's clinic name and phone number    Your medical, surgical and anesthesia history    A list of allergies and sensitivities    A list of medicines, including herbal treatments and over-the-counter drugs    Whether the patient has a legal guardian (ask how to send us the papers in advance)  If you have a child who's having surgery, please ask for a copy of Preparing for Your Child's Surgery.    Preparing for  surgery    Within 30 days of surgery: Have a pre-op exam (sometimes called an H&P, or History and Physical). This can be done at a clinic or pre-operative center.  ? If you're having a , you may not need this exam. Talk to your care team    At your pre-op exam, talk to your care team about all medicines you take. If you need to stop any medicines before surgery, ask when to start taking them again.  ? We do this for your safety. Many medicines can make you bleed too much during surgery. Some change how well surgery (anesthesia) drugs work.    Call your insurance company to let them know you're having surgery. (If you don't have insurance, call [phone].)    Call your clinic if there's any change in your health. This includes signs of a cold or flu (sore throat, runny nose, cough, rash, fever). It also includes a scrape or scratch near the surgery site.    If you have questions on the day of surgery, call your hospital or surgery center.  Eating and drinking guidelines  For your safety: Unless your surgeon tells you otherwise, follow the guidelines below.    Eat and drink as usual until 8 hours before surgery. After that, no food or milk.    Drink clear liquids until 2 hours before surgery. These are liquids you can see through, like water, Gatorade and Propel Water. You may also have black coffee and tea (no cream or milk).    Nothing by mouth within 2 hours of surgery. This includes gum, candy and breath mints.    If you drink, stop drinking alcohol the night before surgery.    If your care team tells you to take medicine on the morning of surgery, it's okay to take it with a sip of water.  Preventing infection    Shower or bathe the night before and morning of your surgery. Follow the instructions your clinic gave you. (If no instructions, use regular soap.)    Don't shave or clip hair near your surgery site. We'll remove the hair if needed.    Don't smoke or vape the morning of surgery. You may chew  nicotine gum up to 2 hours before surgery. A nicotine patch is okay.  ? Note: Some surgeries require you to completely quit smoking and nicotine. Check with your surgeon.    Your care team will make every effort to keep you safe from infection. We will:  ? Clean our hands often with soap and water (or an alcohol-based hand rub).  ? Clean the skin at your surgery site with a special soap that kills germs.  ? Give you a special gown to keep you warm. (Cold raises the risk of infection.)  ? Wear special hair covers, masks, gowns and gloves during surgery.  ? Give antibiotic medicine, if prescribed. Not all surgeries need antibiotics.  What to bring on the day of surgery    Photo ID and insurance card    Copy of your health care directive, if you have one    Glasses and hearing aides (bring cases)  ? You can't wear contacts during surgery    Inhaler and eye drops, if you use them (tell us about these when you arrive)    CPAP machine or breathing device, if you use them    A few personal items, if spending the night    If you have . . .  ? A pacemaker or ICD (cardiac defibrillator): Bring the ID card.  ? An implanted stimulator: Bring the remote control.  ? A legal guardian: Bring a copy of the certified (court-stamped) guardianship papers.  Please remove any jewelry, including body piercings. Leave jewelry and other valuables at home.  If you're going home the day of surgery  Important: If you don't follow the rules below, we must cancel your surgery.     Arrange for someone to drive you home after surgery. You may not drive, take a taxi or take public transportation by yourself (unless you'll have local anesthesia only).    Arrange for a responsible adult to stay with you overnight. If you don't, we may keep you in the hospital overnight, and you may need to pay the costs yourself.  Questions?   If you have any questions for your care team, list them here:  _________________________________________________________________________________________________________________________________________________________________________________________________________________________________________________________________________________________________________________________  For informational purposes only. Not to replace the advice of your health care provider. Copyright   2003, 2019 [facility]. All rights reserved. Clinically reviewed by Guerita Cabral MD. SMARTworks 256063 - REV 4/20.

## 2021-06-04 ASSESSMENT — PATIENT HEALTH QUESTIONNAIRE - PHQ9
5. POOR APPETITE OR OVEREATING: MORE THAN HALF THE DAYS
SUM OF ALL RESPONSES TO PHQ QUESTIONS 1-9: 17

## 2021-06-04 ASSESSMENT — ANXIETY QUESTIONNAIRES
IF YOU CHECKED OFF ANY PROBLEMS ON THIS QUESTIONNAIRE, HOW DIFFICULT HAVE THESE PROBLEMS MADE IT FOR YOU TO DO YOUR WORK, TAKE CARE OF THINGS AT HOME, OR GET ALONG WITH OTHER PEOPLE: VERY DIFFICULT
1. FEELING NERVOUS, ANXIOUS, OR ON EDGE: MORE THAN HALF THE DAYS
GAD7 TOTAL SCORE: 13
3. WORRYING TOO MUCH ABOUT DIFFERENT THINGS: NEARLY EVERY DAY
6. BECOMING EASILY ANNOYED OR IRRITABLE: MORE THAN HALF THE DAYS
5. BEING SO RESTLESS THAT IT IS HARD TO SIT STILL: SEVERAL DAYS
2. NOT BEING ABLE TO STOP OR CONTROL WORRYING: MORE THAN HALF THE DAYS
7. FEELING AFRAID AS IF SOMETHING AWFUL MIGHT HAPPEN: SEVERAL DAYS

## 2021-06-05 ASSESSMENT — ANXIETY QUESTIONNAIRES: GAD7 TOTAL SCORE: 13

## 2021-10-06 NOTE — TELEPHONE ENCOUNTER
Faxed pre-op and labs to both fax numbers listed below.   W Plasty Text: The lesion was extirpated to the level of the fat with a #15 scalpel blade.  Given the location of the defect, shape of the defect and the proximity to free margins a W-plasty was deemed most appropriate for repair.  Using a sterile surgical marker, the appropriate transposition arms of the W-plasty were drawn incorporating the defect and placing the expected incisions within the relaxed skin tension lines where possible.    The area thus outlined was incised deep to adipose tissue with a #15 scalpel blade.  The skin margins were undermined to an appropriate distance in all directions utilizing iris scissors.  The opposing transposition arms were then transposed into place in opposite direction and anchored with interrupted buried subcutaneous sutures.

## 2021-10-10 ENCOUNTER — HEALTH MAINTENANCE LETTER (OUTPATIENT)
Age: 36
End: 2021-10-10

## 2021-11-12 ENCOUNTER — TELEPHONE (OUTPATIENT)
Dept: BEHAVIORAL HEALTH | Facility: CLINIC | Age: 36
End: 2021-11-12
Payer: COMMERCIAL

## 2022-01-30 ENCOUNTER — HEALTH MAINTENANCE LETTER (OUTPATIENT)
Age: 37
End: 2022-01-30

## 2022-02-04 ENCOUNTER — TELEPHONE (OUTPATIENT)
Dept: OBGYN | Facility: CLINIC | Age: 37
End: 2022-02-04

## 2022-02-04 NOTE — TELEPHONE ENCOUNTER
Patient Quality Outreach    Patient is due for the following:   Cervical Cancer Screening - PAP Needed  Physical  - Now    NEXT STEPS:   Schedule a yearly physical    Type of outreach:    Sent bTendo message.    Next Steps:  Reach out within 90 days via bTendo.    Max number of attempts reached: No. Will try again in 90 days if patient still on fail list.    Questions for provider review:    None     Dasha Reveles CMA  Chart routed to Care Team.

## 2022-03-11 ENCOUNTER — OFFICE VISIT (OUTPATIENT)
Dept: FAMILY MEDICINE | Facility: CLINIC | Age: 37
End: 2022-03-11
Payer: COMMERCIAL

## 2022-03-11 ENCOUNTER — MYC MEDICAL ADVICE (OUTPATIENT)
Dept: FAMILY MEDICINE | Facility: CLINIC | Age: 37
End: 2022-03-11

## 2022-03-11 VITALS
BODY MASS INDEX: 39.83 KG/M2 | OXYGEN SATURATION: 99 % | DIASTOLIC BLOOD PRESSURE: 80 MMHG | HEART RATE: 106 BPM | WEIGHT: 246.8 LBS | SYSTOLIC BLOOD PRESSURE: 136 MMHG

## 2022-03-11 DIAGNOSIS — E11.29 MICROALBUMINURIA DUE TO TYPE 2 DIABETES MELLITUS (H): ICD-10-CM

## 2022-03-11 DIAGNOSIS — E28.2 PCOS (POLYCYSTIC OVARIAN SYNDROME): ICD-10-CM

## 2022-03-11 DIAGNOSIS — E66.01 MORBID OBESITY (H): ICD-10-CM

## 2022-03-11 DIAGNOSIS — R80.9 MICROALBUMINURIA DUE TO TYPE 2 DIABETES MELLITUS (H): ICD-10-CM

## 2022-03-11 DIAGNOSIS — F41.8 DEPRESSION WITH ANXIETY: ICD-10-CM

## 2022-03-11 LAB
CREAT UR-MCNC: 42 MG/DL
ERYTHROCYTE [DISTWIDTH] IN BLOOD BY AUTOMATED COUNT: 12.1 % (ref 10–15)
HBA1C MFR BLD: 11.2 % (ref 0–5.6)
HCT VFR BLD AUTO: 42.5 % (ref 35–47)
HGB BLD-MCNC: 14.3 G/DL (ref 11.7–15.7)
HOLD SPECIMEN: NORMAL
HOLD SPECIMEN: NORMAL
MCH RBC QN AUTO: 28.9 PG (ref 26.5–33)
MCHC RBC AUTO-ENTMCNC: 33.6 G/DL (ref 31.5–36.5)
MCV RBC AUTO: 86 FL (ref 78–100)
MICROALBUMIN UR-MCNC: 2.35 MG/DL (ref 0–1.99)
MICROALBUMIN/CREAT UR: 56 MG/G CR
PLATELET # BLD AUTO: 296 10E3/UL (ref 150–450)
RBC # BLD AUTO: 4.95 10E6/UL (ref 3.8–5.2)
WBC # BLD AUTO: 10.6 10E3/UL (ref 4–11)

## 2022-03-11 PROCEDURE — 99214 OFFICE O/P EST MOD 30 MIN: CPT | Performed by: FAMILY MEDICINE

## 2022-03-11 PROCEDURE — 82043 UR ALBUMIN QUANTITATIVE: CPT | Performed by: FAMILY MEDICINE

## 2022-03-11 PROCEDURE — 83036 HEMOGLOBIN GLYCOSYLATED A1C: CPT | Performed by: FAMILY MEDICINE

## 2022-03-11 PROCEDURE — 36415 COLL VENOUS BLD VENIPUNCTURE: CPT | Performed by: FAMILY MEDICINE

## 2022-03-11 PROCEDURE — 85027 COMPLETE CBC AUTOMATED: CPT | Performed by: FAMILY MEDICINE

## 2022-03-11 PROCEDURE — 80053 COMPREHEN METABOLIC PANEL: CPT | Performed by: FAMILY MEDICINE

## 2022-03-11 RX ORDER — CYCLOPENTOLATE HYDROCHLORIDE 10 MG/ML
SOLUTION/ DROPS OPHTHALMIC
COMMUNITY
Start: 2021-08-27

## 2022-03-11 RX ORDER — LOSARTAN POTASSIUM 25 MG/1
25 TABLET ORAL DAILY
Qty: 90 TABLET | Refills: 3 | Status: SHIPPED | OUTPATIENT
Start: 2022-03-11

## 2022-03-11 RX ORDER — VENLAFAXINE HYDROCHLORIDE 75 MG/1
75 CAPSULE, EXTENDED RELEASE ORAL DAILY
Qty: 30 CAPSULE | Refills: 2 | Status: SHIPPED | OUTPATIENT
Start: 2022-03-11 | End: 2022-06-16

## 2022-03-11 RX ORDER — LIRAGLUTIDE 6 MG/ML
INJECTION SUBCUTANEOUS
Qty: 6 ML | Refills: 3 | Status: SHIPPED | OUTPATIENT
Start: 2022-03-11 | End: 2022-06-16

## 2022-03-11 RX ORDER — PREDNISOLONE ACETATE 10 MG/ML
SUSPENSION/ DROPS OPHTHALMIC
COMMUNITY
Start: 2022-03-03

## 2022-03-11 ASSESSMENT — ANXIETY QUESTIONNAIRES
1. FEELING NERVOUS, ANXIOUS, OR ON EDGE: SEVERAL DAYS
3. WORRYING TOO MUCH ABOUT DIFFERENT THINGS: SEVERAL DAYS
5. BEING SO RESTLESS THAT IT IS HARD TO SIT STILL: NOT AT ALL
6. BECOMING EASILY ANNOYED OR IRRITABLE: SEVERAL DAYS
7. FEELING AFRAID AS IF SOMETHING AWFUL MIGHT HAPPEN: NOT AT ALL
GAD7 TOTAL SCORE: 6
2. NOT BEING ABLE TO STOP OR CONTROL WORRYING: SEVERAL DAYS
IF YOU CHECKED OFF ANY PROBLEMS ON THIS QUESTIONNAIRE, HOW DIFFICULT HAVE THESE PROBLEMS MADE IT FOR YOU TO DO YOUR WORK, TAKE CARE OF THINGS AT HOME, OR GET ALONG WITH OTHER PEOPLE: VERY DIFFICULT

## 2022-03-11 ASSESSMENT — PATIENT HEALTH QUESTIONNAIRE - PHQ9
5. POOR APPETITE OR OVEREATING: MORE THAN HALF THE DAYS
SUM OF ALL RESPONSES TO PHQ QUESTIONS 1-9: 10
10. IF YOU CHECKED OFF ANY PROBLEMS, HOW DIFFICULT HAVE THESE PROBLEMS MADE IT FOR YOU TO DO YOUR WORK, TAKE CARE OF THINGS AT HOME, OR GET ALONG WITH OTHER PEOPLE: SOMEWHAT DIFFICULT
SUM OF ALL RESPONSES TO PHQ QUESTIONS 1-9: 10

## 2022-03-12 ASSESSMENT — ANXIETY QUESTIONNAIRES: GAD7 TOTAL SCORE: 6

## 2022-03-12 ASSESSMENT — PATIENT HEALTH QUESTIONNAIRE - PHQ9: SUM OF ALL RESPONSES TO PHQ QUESTIONS 1-9: 10

## 2022-03-14 LAB
ALBUMIN SERPL-MCNC: 4.1 G/DL (ref 3.5–5)
ALP SERPL-CCNC: 104 U/L (ref 45–120)
ALT SERPL W P-5'-P-CCNC: 39 U/L (ref 0–45)
ANION GAP SERPL CALCULATED.3IONS-SCNC: 13 MMOL/L (ref 5–18)
AST SERPL W P-5'-P-CCNC: 31 U/L (ref 0–40)
BILIRUB SERPL-MCNC: 0.6 MG/DL (ref 0–1)
BUN SERPL-MCNC: 10 MG/DL (ref 8–22)
CALCIUM SERPL-MCNC: 10.3 MG/DL (ref 8.5–10.5)
CHLORIDE BLD-SCNC: 98 MMOL/L (ref 98–107)
CO2 SERPL-SCNC: 26 MMOL/L (ref 22–31)
CREAT SERPL-MCNC: 0.76 MG/DL (ref 0.6–1.1)
GFR SERPL CREATININE-BSD FRML MDRD: >90 ML/MIN/1.73M2
GLUCOSE BLD-MCNC: 439 MG/DL (ref 70–125)
POTASSIUM BLD-SCNC: 4.1 MMOL/L (ref 3.5–5)
PROT SERPL-MCNC: 8.1 G/DL (ref 6–8)
SODIUM SERPL-SCNC: 137 MMOL/L (ref 136–145)

## 2022-04-21 ENCOUNTER — OFFICE VISIT (OUTPATIENT)
Dept: FAMILY MEDICINE | Facility: CLINIC | Age: 37
End: 2022-04-21
Payer: COMMERCIAL

## 2022-04-21 VITALS
TEMPERATURE: 98.1 F | HEART RATE: 98 BPM | SYSTOLIC BLOOD PRESSURE: 124 MMHG | HEIGHT: 66 IN | RESPIRATION RATE: 20 BRPM | OXYGEN SATURATION: 98 % | WEIGHT: 252.44 LBS | BODY MASS INDEX: 40.57 KG/M2 | DIASTOLIC BLOOD PRESSURE: 86 MMHG

## 2022-04-21 DIAGNOSIS — Z01.818 PREOP GENERAL PHYSICAL EXAM: ICD-10-CM

## 2022-04-21 PROCEDURE — 99214 OFFICE O/P EST MOD 30 MIN: CPT | Performed by: FAMILY MEDICINE

## 2022-04-21 NOTE — PROGRESS NOTES
[address]  [place] [address]  Phone: [phone]  Fax: [phone]  Primary Provider: Kodak Laws  Pre-op Performing Provider: JODEE NAGY      PREOPERATIVE EVALUATION:  Today's date: 4/21/2022    Jojo Mccallum is a 36 year old female who presents for a preoperative evaluation.    Surgical Information:  Surgery/Procedure: Right eye surgery  Surgery Location: [facility]  Surgeon: Dr Fabio Fischer  Surgery Date: 4/26/22  Time of Surgery: 4pm  Where patient plans to recover: At home with family  Fax number for surgical facility: [phone]    Type of Anesthesia Anticipated: Local    Assessment & Plan     The proposed surgical procedure is considered LOW risk.    Diabetes mellitus type 2, uncontrolled, with complications (H)  A1c is elevated however she just got access to her medications and will have regular follow-ups with her primary care physician PCP note reviewed    Preop general physical exam    PM perioperative guidance discussed with the patient.           Risks and Recommendations:  The patient has the following additional risks and recommendations for perioperative complications:   - No identified additional risk factors other than previously addressed    Medication Instructions:  Patient is to take all scheduled medications on the day of surgery    RECOMMENDATION:  APPROVAL GIVEN to proceed with proposed procedure, without further diagnostic evaluation.    Review of external notes as documented above               Subjective     HPI related to upcoming procedure: Patient has history of panuveitis and followed frequently by opthalmology. She had a right eye cataract surgery done in 6/2021 due to worsening. She notes that after about one week later she began to develop blurry vision in her right eye. At that time her symptoms were managed with capsulectomy and steroid drops.     Preop Questions 4/18/2022   1. Have you  ever had a heart attack or stroke? No   2. Have you ever had surgery on your heart or blood vessels, such as a stent placement, a coronary artery bypass, or surgery on an artery in your head, neck, heart, or legs? No   3. Do you have chest pain with activity?  No   4. Do you have a history of  heart failure? No   5. Do you currently have a cold, bronchitis or symptoms of other infection? No   6. Do you have a cough, shortness of breath, or wheezing? No   7. Do you or anyone in your family have previous history of blood clots? No   8. Do you or does anyone in your family have a serious bleeding problem such as prolonged bleeding following surgeries or cuts? No   9. Have you ever had problems with anemia or been told to take iron pills? No   10. Have you had any abnormal blood loss such as black, tarry or bloody stools, or abnormal vaginal bleeding? No   11. Have you ever had a blood transfusion? No   12. Are you willing to have a blood transfusion if it is medically needed before, during, or after your surgery? Yes   13. Have you or any of your relatives ever had problems with anesthesia? No   14. Do you have sleep apnea, excessive snoring or daytime drowsiness? No   15. Do you have any artifical heart valves or other implanted medical devices like a pacemaker, defibrillator, or continuous glucose monitor? No   16. Do you have artificial joints? No   17. Are you allergic to latex? No   18. Is there any chance that you may be pregnant? No       Health Care Directive:  956}        Review of Systems  CONSTITUTIONAL: NEGATIVE for fever, chills, change in weight  INTEGUMENTARY/SKIN: NEGATIVE for worrisome rashes, moles or lesions  EYES: NEGATIVE for vision changes or irritation  ENT/MOUTH: NEGATIVE for ear, mouth and throat problems  RESP: NEGATIVE for significant cough or SOB  CV: NEGATIVE for chest pain, palpitations or peripheral edema  GI: NEGATIVE for nausea, abdominal pain, heartburn, or change in bowel habits  :  NEGATIVE for frequency, dysuria, or hematuria  MUSCULOSKELETAL: NEGATIVE for significant arthralgias or myalgia  NEURO: NEGATIVE for weakness, dizziness or paresthesias  ENDOCRINE: NEGATIVE for temperature intolerance, skin/hair changes  HEME: NEGATIVE for bleeding problems  PSYCHIATRIC: NEGATIVE for changes in mood or affect    Patient Active Problem List    Diagnosis Date Noted     PCOS (polycystic ovarian syndrome) 2020     Priority: Medium     Obesity (BMI 35.0-39.9) with comorbidity (H) 02/10/2020     Priority: Medium     NAFLD (nonalcoholic fatty liver disease) 2019     Priority: Medium     Diabetes mellitus type 2, uncontrolled, with complications (H) 2019     Priority: Medium     Microalbuminuria due to type 2 diabetes mellitus (H) 2019     Priority: Medium     Panuveitis of both eyes 2018     Priority: Medium     Gets steroid injections every 2-3 months. Supplements with prednisone eye drops.       Depression with anxiety 2015     Priority: Medium     Obesity (BMI 35.0-39.9 without comorbidity) 2014     Priority: Medium      Past Medical History:   Diagnosis Date     Depressive disorder      Diabetes (H)      Nexplanon in place 2021    Left     Past Surgical History:   Procedure Laterality Date      SECTION       Current Outpatient Medications   Medication Sig Dispense Refill     ACCU-CHEK GUIDE test strip U TO TEST TID       blood glucose monitoring (ACCU-CHEK FASTCLIX) lancets U UTD BID       etonogestrel (NEXPLANON) 68 MG IMPL 1 each by Subdermal route once       insulin pen needle (32G X 6 MM) 32G X 6 MM miscellaneous Use one pen needle daily or as directed. 100 each 3     liraglutide (VICTOZA) 18 MG/3ML solution Inject 0.6 mg subcutaneously daily x 7 days, then inject 1.2 mg subcutaneously daily 6 mL 3     losartan (COZAAR) 25 MG tablet Take 1 tablet (25 mg) by mouth daily 90 tablet 3     metFORMIN (GLUCOPHAGE) 500 MG tablet Take 2 tablets  (1,000 mg) by mouth 2 times daily (with meals) 180 tablet 1     prednisoLONE acetate (PRED FORTE) 1 % ophthalmic suspension SHAKE LIQUID AND INSTILL 1 DROP IN BOTH EYES EVERY 2 HOURS       venlafaxine (EFFEXOR-XR) 75 MG 24 hr capsule Take 1 capsule (75 mg) by mouth daily 30 capsule 2     cyclopentolate (CYCLOGYL) 1 % ophthalmic solution INSTILL 1 DROP IN BOTH EYES TWICE DAILY       insulin pen needle (32G X 4 MM) 32G X 4 MM miscellaneous Use 1 pen needles daily or as directed. 100 each 1       No Known Allergies     Social History     Tobacco Use     Smoking status: Former Smoker     Packs/day: 0.10     Types: Cigarettes     Smokeless tobacco: Never Used   Substance Use Topics     Alcohol use: Not Currently     Family History   Problem Relation Age of Onset     Diabetes Mother      Obesity Mother      Kidney Disease Father      Dialysis Father      Obesity Brother      Diabetes Maternal Grandmother      Cerebrovascular Disease Maternal Grandmother         stroke     Obesity Brother      History   Drug Use Unknown         Objective     LMP 04/01/2022   Breastfeeding No     Physical Exam    GENERAL APPEARANCE: healthy, alert and no distress     EYES: EOMI, PERRL     HENT: ear canals and TM's normal and nose and mouth without ulcers or lesions     NECK: no adenopathy, no asymmetry, masses, or scars and thyroid normal to palpation     RESP: lungs clear to auscultation - no rales, rhonchi or wheezes     CV: regular rates and rhythm, normal S1 S2, no S3 or S4 and no murmur, click or rub     ABDOMEN:  soft, nontender, no HSM or masses and bowel sounds normal     MS: extremities normal- no gross deformities noted, no evidence of inflammation in joints, FROM in all extremities.     SKIN: no suspicious lesions or rashes     NEURO: Normal strength and tone, sensory exam grossly normal, mentation intact and speech normal     PSYCH: mentation appears normal. and affect normal/bright     LYMPHATICS: No cervical  adenopathy    Recent Labs   Lab Test 03/11/22  1227 06/03/21  0755   HGB 14.3  --      --     134   POTASSIUM 4.1 4.1   CR 0.76 0.63   A1C 11.2* 10.2*        Diagnostics:  No labs were ordered during this visit.   No EKG required for low risk surgery (cataract, skin procedure, breast biopsy, etc).               Signed Electronically by: Melecio Boone MD  Copy of this evaluation report is provided to requesting physician.

## 2022-04-21 NOTE — PATIENT INSTRUCTIONS
Was a pleasure to see you today      Because you have had your lab tests with your primary care provider last month we do not need to do any blood tests for you today      You are immunized against COVID-19 therefore you will not need a COVID-19 test.        Please remember not to eat or drink anything after 12 noon on the day of your surgery      If you have any questions please do not hesitate to contact us or reach us he can also do so by sending a message via [employer]

## 2022-05-04 NOTE — TELEPHONE ENCOUNTER
Patient Quality Outreach    Patient is due for the following:   Cervical Cancer Screening - PAP Needed  Physical  - Now    NEXT STEPS:   Schedule a yearly physical    Type of outreach:    Sent Changelight message.    Next Steps:  Reach out within 90 days via Changelight.    Max number of attempts reached: No. Will try again in 90 days if patient still on fail list.    Questions for provider review:    None     Tona Colmenares MA  Chart routed to Care Team.      .

## 2022-05-22 ENCOUNTER — HEALTH MAINTENANCE LETTER (OUTPATIENT)
Age: 37
End: 2022-05-22

## 2022-06-16 ENCOUNTER — OFFICE VISIT (OUTPATIENT)
Dept: FAMILY MEDICINE | Facility: CLINIC | Age: 37
End: 2022-06-16
Payer: COMMERCIAL

## 2022-06-16 VITALS
SYSTOLIC BLOOD PRESSURE: 120 MMHG | DIASTOLIC BLOOD PRESSURE: 80 MMHG | HEART RATE: 106 BPM | BODY MASS INDEX: 39.73 KG/M2 | OXYGEN SATURATION: 98 % | WEIGHT: 248 LBS

## 2022-06-16 DIAGNOSIS — E78.5 HYPERLIPIDEMIA LDL GOAL <100: ICD-10-CM

## 2022-06-16 DIAGNOSIS — R80.9 MICROALBUMINURIA DUE TO TYPE 2 DIABETES MELLITUS (H): ICD-10-CM

## 2022-06-16 DIAGNOSIS — F41.8 DEPRESSION WITH ANXIETY: ICD-10-CM

## 2022-06-16 DIAGNOSIS — Z11.4 SCREENING FOR HIV (HUMAN IMMUNODEFICIENCY VIRUS): ICD-10-CM

## 2022-06-16 DIAGNOSIS — Z11.59 NEED FOR HEPATITIS C SCREENING TEST: ICD-10-CM

## 2022-06-16 DIAGNOSIS — Z23 ENCOUNTER FOR IMMUNIZATION: ICD-10-CM

## 2022-06-16 DIAGNOSIS — E11.29 MICROALBUMINURIA DUE TO TYPE 2 DIABETES MELLITUS (H): ICD-10-CM

## 2022-06-16 DIAGNOSIS — E28.2 PCOS (POLYCYSTIC OVARIAN SYNDROME): ICD-10-CM

## 2022-06-16 LAB
ALBUMIN SERPL-MCNC: 3.8 G/DL (ref 3.5–5)
ALP SERPL-CCNC: 96 U/L (ref 45–120)
ALT SERPL W P-5'-P-CCNC: 73 U/L (ref 0–45)
ANION GAP SERPL CALCULATED.3IONS-SCNC: 15 MMOL/L (ref 5–18)
AST SERPL W P-5'-P-CCNC: 51 U/L (ref 0–40)
BILIRUB SERPL-MCNC: 0.6 MG/DL (ref 0–1)
BUN SERPL-MCNC: 10 MG/DL (ref 8–22)
CALCIUM SERPL-MCNC: 9.5 MG/DL (ref 8.5–10.5)
CHLORIDE BLD-SCNC: 101 MMOL/L (ref 98–107)
CHOLEST SERPL-MCNC: 225 MG/DL
CO2 SERPL-SCNC: 20 MMOL/L (ref 22–31)
CREAT SERPL-MCNC: 0.7 MG/DL (ref 0.6–1.1)
GFR SERPL CREATININE-BSD FRML MDRD: >90 ML/MIN/1.73M2
GLUCOSE BLD-MCNC: 344 MG/DL (ref 70–125)
HBA1C MFR BLD: 10.8 % (ref 0–5.6)
HDLC SERPL-MCNC: 40 MG/DL
HIV 1+2 AB+HIV1 P24 AG SERPL QL IA: NEGATIVE
HOLD SPECIMEN: NORMAL
LDLC SERPL CALC-MCNC: 145 MG/DL
LDLC SERPL CALC-MCNC: ABNORMAL MG/DL
POTASSIUM BLD-SCNC: 4.1 MMOL/L (ref 3.5–5)
PROT SERPL-MCNC: 7.3 G/DL (ref 6–8)
SODIUM SERPL-SCNC: 136 MMOL/L (ref 136–145)
TRIGL SERPL-MCNC: 402 MG/DL

## 2022-06-16 PROCEDURE — 36415 COLL VENOUS BLD VENIPUNCTURE: CPT | Performed by: FAMILY MEDICINE

## 2022-06-16 PROCEDURE — 83036 HEMOGLOBIN GLYCOSYLATED A1C: CPT | Performed by: FAMILY MEDICINE

## 2022-06-16 PROCEDURE — 99214 OFFICE O/P EST MOD 30 MIN: CPT | Mod: 25 | Performed by: FAMILY MEDICINE

## 2022-06-16 PROCEDURE — 87389 HIV-1 AG W/HIV-1&-2 AB AG IA: CPT | Performed by: FAMILY MEDICINE

## 2022-06-16 PROCEDURE — 80061 LIPID PANEL: CPT | Performed by: FAMILY MEDICINE

## 2022-06-16 PROCEDURE — 90471 IMMUNIZATION ADMIN: CPT | Performed by: FAMILY MEDICINE

## 2022-06-16 PROCEDURE — 80053 COMPREHEN METABOLIC PANEL: CPT | Performed by: FAMILY MEDICINE

## 2022-06-16 PROCEDURE — 86803 HEPATITIS C AB TEST: CPT | Performed by: FAMILY MEDICINE

## 2022-06-16 PROCEDURE — 83721 ASSAY OF BLOOD LIPOPROTEIN: CPT | Mod: 59 | Performed by: FAMILY MEDICINE

## 2022-06-16 PROCEDURE — 90677 PCV20 VACCINE IM: CPT | Performed by: FAMILY MEDICINE

## 2022-06-16 RX ORDER — LIRAGLUTIDE 6 MG/ML
INJECTION SUBCUTANEOUS
Qty: 9 ML | Refills: 5 | Status: SHIPPED | OUTPATIENT
Start: 2022-06-16 | End: 2023-01-10 | Stop reason: ALTCHOICE

## 2022-06-16 RX ORDER — VENLAFAXINE HYDROCHLORIDE 150 MG/1
150 CAPSULE, EXTENDED RELEASE ORAL DAILY
Qty: 30 CAPSULE | Refills: 5 | Status: SHIPPED | OUTPATIENT
Start: 2022-06-16

## 2022-06-16 ASSESSMENT — ANXIETY QUESTIONNAIRES
1. FEELING NERVOUS, ANXIOUS, OR ON EDGE: SEVERAL DAYS
5. BEING SO RESTLESS THAT IT IS HARD TO SIT STILL: NOT AT ALL
GAD7 TOTAL SCORE: 6
7. FEELING AFRAID AS IF SOMETHING AWFUL MIGHT HAPPEN: NOT AT ALL
6. BECOMING EASILY ANNOYED OR IRRITABLE: MORE THAN HALF THE DAYS
GAD7 TOTAL SCORE: 6
7. FEELING AFRAID AS IF SOMETHING AWFUL MIGHT HAPPEN: NOT AT ALL
GAD7 TOTAL SCORE: 6
3. WORRYING TOO MUCH ABOUT DIFFERENT THINGS: SEVERAL DAYS
4. TROUBLE RELAXING: SEVERAL DAYS
8. IF YOU CHECKED OFF ANY PROBLEMS, HOW DIFFICULT HAVE THESE MADE IT FOR YOU TO DO YOUR WORK, TAKE CARE OF THINGS AT HOME, OR GET ALONG WITH OTHER PEOPLE?: SOMEWHAT DIFFICULT
2. NOT BEING ABLE TO STOP OR CONTROL WORRYING: SEVERAL DAYS

## 2022-06-16 ASSESSMENT — PATIENT HEALTH QUESTIONNAIRE - PHQ9
SUM OF ALL RESPONSES TO PHQ QUESTIONS 1-9: 10
SUM OF ALL RESPONSES TO PHQ QUESTIONS 1-9: 10
10. IF YOU CHECKED OFF ANY PROBLEMS, HOW DIFFICULT HAVE THESE PROBLEMS MADE IT FOR YOU TO DO YOUR WORK, TAKE CARE OF THINGS AT HOME, OR GET ALONG WITH OTHER PEOPLE: SOMEWHAT DIFFICULT

## 2022-06-16 ASSESSMENT — PAIN SCALES - GENERAL: PAINLEVEL: NO PAIN (0)

## 2022-06-16 NOTE — PROGRESS NOTES
Assessment/Plan:    Diabetes mellitus type 2, uncontrolled, with complications (H)  Type 2 diabetes suboptimal control A1c improving from 11.2% down to 10.8% today.  Had started Victoza 0.6 mg daily x1 week then increase to 1.2 mg daily.  Tolerating well.  Using metformin 500 mg using 2 tablets twice daily.  Patient elects to increase Victoza to 1.8 mg daily without further medication adjustment and will reassess at physical exam with Dr. Valdes in 3 months.  - HEMOGLOBIN A1C  - HEMOGLOBIN A1C  - liraglutide (VICTOZA) 18 MG/3ML solution  Dispense: 9 mL; Refill: 5  - Comprehensive metabolic panel    Depression with anxiety  Depression and anxiety suboptimal control PHQ-9 questionnaire 10 out of 27 and GABE-7 questionnaire 6 out of 21.  Has tolerated venlafaxine extended release 75 mg daily without side effect and does agree to increasing to 150 mg daily and reassess at physical no later than 3 months.  - venlafaxine (EFFEXOR XR) 150 MG 24 hr capsule  Dispense: 30 capsule; Refill: 5    Microalbuminuria due to type 2 diabetes mellitus (H)  Microalbuminuria.  Switch from lisinopril 2.5 mg daily due to cough and now using losartan 25 mg daily for renal protective benefits.  No cough noted.  Repeat microalbumin screen at follow-up physical exam in 3 months.  - Comprehensive metabolic panel    Screening for HIV (human immunodeficiency virus)  Routine HIV screen based on age criteria.  - HIV Antigen Antibody Combo    Need for hepatitis C screening test  Routine hepatitis C screen based on age criteria.  - Hepatitis C Screen Reflex to HCV RNA Quant and Genotype    PCOS (polycystic ovarian syndrome)  History of PCOS.  Continues metformin.  Has a 7-year-old son Abran.  Hoping to get pregnant later this year.    Hyperlipidemia LDL goal <100  Check lipid cascade today.  Dietary and exercise modifications for management currently and avoid statin therapy.  - Lipid panel reflex to direct LDL Fasting    Encounter for  "immunization  Pneumococcal 23 received greater than 1 year ago.  Pneumococcal 20 vaccine provided today.  COVID vaccination series up-to-date.  Including first booster.  - Pneumococcal 20 Valent Conjugate (Prevnar 20)          Subjective:    Jojo Mccallum is seen today for follow-up assessment.  Type 2 diabetes.  History of gestational diabetes.  Was on metformin previously with PCOS history.  Now using metformin 500 mg using 2 tablets twice daily and did start Victoza titrated to 1.2 mg following prior office visit 2022.  No side effects.  Also switch from lisinopril to losartan due to prior cough with lisinopril.  Cough went away.  Depression and anxiety and was started on venlafaxine XR 75 mg daily.  Tolerates well.  States sertraline 150 mg in the past was not of benefit.  Denies recent illness.  Comprehensive review of systems as above otherwise all negative.  Has a 7-year-old son and hopes to get pregnant later this year perhaps.  Comprehensive review of systems as above otherwise all negative.    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)   Hobbies:  video games; art projects, reading (historical fiction)    Past Surgical History:   Procedure Laterality Date      SECTION          Family History   Problem Relation Age of Onset     Diabetes Mother      Obesity Mother      Kidney Disease Father      Dialysis Father      Obesity Brother      Diabetes Maternal Grandmother      Cerebrovascular Disease Maternal Grandmother         stroke     Obesity Brother         Past Medical History:   Diagnosis Date     Depressive disorder      Diabetes (H)      Nexplanon in place 2021    Left        Social History     Tobacco " Use     Smoking status: Former Smoker     Packs/day: 0.10     Types: Cigarettes     Smokeless tobacco: Never Used   Substance Use Topics     Alcohol use: Not Currently     Drug use: Not Currently        Current Outpatient Medications   Medication Sig Dispense Refill     ACCU-CHEK GUIDE test strip U TO TEST TID       blood glucose monitoring (ACCU-CHEK FASTCLIX) lancets U UTD BID       cyclopentolate (CYCLOGYL) 1 % ophthalmic solution INSTILL 1 DROP IN BOTH EYES TWICE DAILY       etonogestrel (NEXPLANON) 68 MG IMPL 1 each by Subdermal route once       insulin pen needle (32G X 6 MM) 32G X 6 MM miscellaneous Use one pen needle daily or as directed. 100 each 3     liraglutide (VICTOZA) 18 MG/3ML solution Inject 1.8 mg subcutaneously daily 9 mL 5     losartan (COZAAR) 25 MG tablet Take 1 tablet (25 mg) by mouth daily 90 tablet 3     metFORMIN (GLUCOPHAGE) 500 MG tablet Take 2 tablets (1,000 mg) by mouth 2 times daily (with meals) 180 tablet 1     prednisoLONE acetate (PRED FORTE) 1 % ophthalmic suspension SHAKE LIQUID AND INSTILL 1 DROP IN BOTH EYES EVERY 2 HOURS       venlafaxine (EFFEXOR XR) 150 MG 24 hr capsule Take 1 capsule (150 mg) by mouth daily 30 capsule 5          Objective:    Vitals:    06/16/22 0937   BP: 120/80   Pulse: 106   SpO2: 98%   Weight: 112.5 kg (248 lb)      Body mass index is 39.73 kg/m .    Alert.  No apparent distress.  Chest clear.  Cardiac exam regular.  Extremities warm and dry.      This note has been dictated using voice recognition software and as a result may contain minor grammatical errors and unintended word substitutions.       Answers for HPI/ROS submitted by the patient on 6/16/2022  If you checked off any problems, how difficult have these problems made it for you to do your work, take care of things at home, or get along with other people?: Somewhat difficult  PHQ9 TOTAL SCORE: 10  GABE 7 TOTAL SCORE: 6  Frequency of checking blood sugars:: a few times a month  What time of day  are you checking your blood sugars : before and after meals  Have you had any blood sugars above 200?: No  Have you had any blood sugars below 70?: No  Hypoglycemia symptoms:: shakiness, other  Diabetic concerns:: none  Paraesthesia present:: none of these symptoms  Have you had a diabetic eye exam within the last year?: Yes  How many servings of fruits and vegetables do you eat daily?: 2-3  On average, how many sweetened beverages do you drink each day (Examples: soda, juice, sweet tea, etc.  Do NOT count diet or artificially sweetened beverages)?: 1  How many minutes a day do you exercise enough to make your heart beat faster?: 20 to 29  How many days a week do you exercise enough to make your heart beat faster?: 3 or less  How many days per week do you miss taking your medication?: 3

## 2022-06-17 LAB — HCV AB SERPL QL IA: NONREACTIVE

## 2022-06-17 RX ORDER — VENLAFAXINE HYDROCHLORIDE 75 MG/1
CAPSULE, EXTENDED RELEASE ORAL
Qty: 30 CAPSULE | Refills: 2 | OUTPATIENT
Start: 2022-06-17

## 2022-07-21 ENCOUNTER — TRANSFERRED RECORDS (OUTPATIENT)
Dept: HEALTH INFORMATION MANAGEMENT | Facility: CLINIC | Age: 37
End: 2022-07-21

## 2022-08-01 NOTE — PROGRESS NOTES
Assessment/Plan:    Diabetes mellitus type 2, uncontrolled, with complications (H)  Type 2 diabetes reviewed.  Gestational diabetes history.  Son is now 7.  Subsequent diabetes noted.  Due to PCOS had been recommended use Metformin historically.  Now on Metformin 500 mg using 2 tablets twice daily.  Apparently Ozempic not covered by current insurance and did have formulary recommendations that she needed to try prior to Ozempic use however does not know what these were.  Is on state insurance currently and hoping her temporary job will become a permanent position shortly.  A1c did increase from 10.2% up to 11.2% today.  Trial of Victoza 0.6 mg daily x7 days then 1.2 mg daily with reassessment just over 3 months in office.  Patient declines diabetes education at this time having completed previously.  - Hemoglobin A1c  - Comprehensive metabolic panel  - Hemoglobin A1c  - Comprehensive metabolic panel  - liraglutide (VICTOZA) 18 MG/3ML solution  Dispense: 6 mL; Refill: 3    PCOS (polycystic ovarian syndrome)  Metformin 500 mg using 2 tablets twice daily continues.  - Comprehensive metabolic panel  - CBC with platelets  - Comprehensive metabolic panel  - CBC with platelets    Obesity (BMI 35.0-39.9) with comorbidity (H)  Dietary and exercise modifications reviewed for weight goal less than 220 pounds initially, less than 200 pounds ideally.  Has had success with weight loss previously with Ozempic.    Microalbuminuria due to type 2 diabetes mellitus (H)  Update microalbumin level today with history of microalbuminuria.  Lisinopril 2.5 mg daily tends to be associate with a cough so we will switch to losartan 25 mg daily and reassess at follow-up in just over 3 months.  - Albumin Random Urine Quantitative with Creat Ratio  - CBC with platelets  - losartan (COZAAR) 25 MG tablet  Dispense: 90 tablet; Refill: 3  - CBC with platelets    Depression with anxiety  Trial of venlafaxine extended release 75 mg daily.  States  "sertraline 150 mg daily previously not effective for depression anxiety management.  PHQ-9 questionnaire 10 out of 27 and GABE-7 questionnaire 6 out of 21.  - venlafaxine (EFFEXOR-XR) 75 MG 24 hr capsule  Dispense: 30 capsule; Refill: 2          Subjective:    Jojo Mcc[place]um [place]  [place] near [place] in [place].  Type 2 diabetes history.  Gestational diabetes with prior pregnancy with son now age 7.  Subsequent diagnosis of type 2 diabetes.  PCOS history and was told to use Metformin initially titrated up follows milligrams twice daily with addition of Ozempic 1 mg weekly however insurance no longer covering this and requested that she use other alternative formulary choices.  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  Microalbuminuria.  Lisinopril 2.5 mg daily for renal protective benefits.  Has been on sertraline up to 150 mg daily however does not feel that this did help with depression and anxiety and does have described panic attacks perhaps twice a month while driving.  Comprehensive review of systems as above otherwise all negative.    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)  Hobbies:  video games; art projects, reading (historical fiction)      Answers for HPI/ROS submitted by the patient on 3/11/2022  If you checked off any problems, how difficult have these problems made it for you to do your work, take care of things at home, or get along with other people?: Somewhat difficult  PHQ9 TOTAL SCORE: 10  Frequency of exercise:: 1 day/week  Getting at least 3 servings of " Calcium per day:: Yes  Diet:: Regular (no restrictions)  Taking medications regularly:: Yes  Medication side effects:: None  Bi-annual eye exam:: Yes  Dental care twice a year:: NO  Sleep apnea or symptoms of sleep apnea:: None  Additional concerns today:: Yes  Duration of exercise:: 15-30 minutes          Past Surgical History:   Procedure Laterality Date      SECTION          Family History   Problem Relation Age of Onset     Diabetes Mother      Obesity Mother      Kidney Disease Father      Dialysis Father      Obesity Brother      Diabetes Maternal Grandmother      Cerebrovascular Disease Maternal Grandmother         stroke     Obesity Brother         Past Medical History:   Diagnosis Date     Depressive disorder      Diabetes (H)      Nexplanon in place 2021    Left        Social History     Tobacco Use     Smoking status: Current Some Day Smoker     Packs/day: 0.10     Types: Cigarettes     Smokeless tobacco: Never Used   Substance Use Topics     Alcohol use: Not Currently     Drug use: Not Currently        Current Outpatient Medications   Medication Sig Dispense Refill     ACCU-CHEK GUIDE test strip U TO TEST TID       blood glucose monitoring (ACCU-CHEK FASTCLIX) lancets U UTD BID       etonogestrel (NEXPLANON) 68 MG IMPL 1 each by Subdermal route once       liraglutide (VICTOZA) 18 MG/3ML solution Inject 0.6 mg subcutaneously daily x 7 days, then inject 1.2 mg subcutaneously daily 6 mL 3     losartan (COZAAR) 25 MG tablet Take 1 tablet (25 mg) by mouth daily 90 tablet 3     metFORMIN (GLUCOPHAGE) 500 MG tablet Take 2 tablets (1,000 mg) by mouth 2 times daily (with meals) 180 tablet 1     venlafaxine (EFFEXOR-XR) 75 MG 24 hr capsule Take 1 capsule (75 mg) by mouth daily 30 capsule 2     cyclopentolate (CYCLOGYL) 1 % ophthalmic solution INSTILL 1 DROP IN BOTH EYES TWICE DAILY       prednisoLONE acetate (PRED FORTE) 1 % ophthalmic suspension SHAKE LIQUID AND INSTILL 1 DROP IN BOTH EYES EVERY  2 HOURS            Objective:    Vitals:    03/11/22 1232   BP: 136/80   BP Location: Right arm   Patient Position: Sitting   Cuff Size: Adult Large   Pulse: 106   SpO2: 99%   Weight: 111.9 kg (246 lb 12.8 oz)      Body mass index is 39.83 kg/m .    Alert.  No apparent distress.  BMI 39.83.  Cardiac exam regular.  Extremities warm and dry.      This note has been dictated using voice recognition software and as a result may contain minor grammatical errors and unintended word substitutions.    negative

## 2022-08-08 ENCOUNTER — TELEPHONE (OUTPATIENT)
Dept: FAMILY MEDICINE | Facility: CLINIC | Age: 37
End: 2022-08-08

## 2022-08-08 NOTE — TELEPHONE ENCOUNTER
Pt tested positive for covid this morning. Is currently in [place]. Would like to know if she can have any med sent to pharmacy.    Fever 99.7, chills, aches, sore throat, cough.    [facility] on [facility] in [place].

## 2022-09-18 ENCOUNTER — HEALTH MAINTENANCE LETTER (OUTPATIENT)
Age: 37
End: 2022-09-18

## 2022-09-19 NOTE — PROGRESS NOTES
Assessment/Plan:   Jojo is a 37 year old female here for physical with additional cocnerns    Routine adult health maintenance  Physical completed today.  Vaccines as below.  Labs as below.  Pap smear completed today.  - INFLUENZA VACCINE IM > 6 MONTHS VALENT IIV4 (AFLURIA/FLUZONE)  - CBC with platelets    Diabetes mellitus type 2, uncontrolled, with complications (H)  Microalbuminuria due to type 2 diabetes mellitus (H)  History of type 2 diabetes, uncontrolled with last A1c 11.8%.  We will recheck A1c today.  Patient also has a history of microalbuminuria.  She follows regularly with a retinal specialist, we discussed having them do a diabetic eye exam with her next eye visit.  Patient is interested in future pregnancy, we discussed importance of good glycemic control, referral to diabetic Ed/endocrinology to get her under control as soon as possible.  - [facility] Adult Diabetes Educator Referral  - Adult Endocrinology  Referral    PCOS (polycystic ovarian syndrome)  Desire for pregnancy  Patient reports history of PCOS, currently having regular periods with Nexplanon in place.  She expresses desire for future pregnancy, starting to try in the next year. We discussed her pregnancy would be high risk: AMA, obesity, DM2 - we discussed importance of getting DM controlled before pregnancy (see above). We also discussed she would need medication changes before pregnancy: likely switch from effexor to selective serotonin reuptake inhibitor, change losartan to labetalol, likely stop metformin/victoza and change to insulin. Would recommend following with OBGYN during pregnancy.     Depression with anxiety  Pt reports controlled at this time on effexor.    NAFLD (nonalcoholic fatty liver disease)  History of fatty liver disease, will recheck levels today.  - Comprehensive metabolic panel (BMP + Alb, Alk Phos, ALT, AST, Total. Bili, TP)    Obesity (BMI 35.0-39.9) with comorbidity (H)  BMI 38.77 today.  Comorbid  conditions include diabetes mellitus, nonalcoholic fatty liver disease.    Cervical cancer screening  Due for Pap smear, cotesting completed today.  - Pap Screen with HPV - recommended age 30 - 65 years    Hepatitis B vaccination status unknown  Hep B vaccination status unknown, will check titer today.  - Hepatitis B Surface Antibody    Vaginal discharge  Vaginal discharge noted on exam, asymptomatic at this time, wet prep obtained and will treat with pills if needed.  - Wet prep - Clinic Collect    Vaginal cyst  Patient with vaginal cyst on right groin/labia, patient interested in removal, I do not believe this to be a Bartholin gland cyst so referral to dermatology placed today.  - Adult Dermatology Referral       I have had an Advance Directives discussion with the patient.  The following high BMI interventions were performed this visit: encouragement to exercise and lifestyle education regarding diet    Follow up: 1 year for physical, sooner as needed    Christine Vlades MD  [facility]      Subjective:     Jojo Mccallum is a 37 year old female who presents for an annual exam.     Answers for HPI/ROS submitted by the patient on 9/20/2022  If you checked off any problems, how difficult have these problems made it for you to do your work, take care of things at home, or get along with other people?: Not difficult at all  PHQ9 TOTAL SCORE: 5  Frequency of exercise:: None  Getting at least 3 servings of Calcium per day:: Yes  Diet:: Diabetic  Taking medications regularly:: Yes  Medication side effects:: None  Bi-annual eye exam:: Yes  Dental care twice a year:: NO  Sleep apnea or symptoms of sleep apnea:: None  abdominal pain: No  Blood in stool: No  Blood in urine: No  chest pain: No  chills: No  congestion: No  constipation: No  cough: No  diarrhea: No  dizziness: No  ear pain: No  eye pain: No  nervous/anxious: No  fever: No  frequency: No  genital sores: No  headaches: No  hearing loss:  No  heartburn: No  arthralgias: No  joint swelling: No  peripheral edema: No  mood changes: No  myalgias: No  nausea: No  dysuria: No  palpitations: No  Skin sensation changes: No  sore throat: No  urgency: No  rash: No  shortness of breath: No  visual disturbance: No  weakness: No  pelvic pain: No  vaginal bleeding: No  vaginal discharge: No  tenderness: No  breast mass: No  breast discharge: No  Additional concerns today:: No    Lump/sore in vaginal area -like a cyst, for a while was inflamed, pus/blood discharge, thought maybe ingrown hair, smaller now but still present, started almost a year ago    Desires pregnancy - just got , hoping to start trying within the next year, hx 1 pregnancy had GDM and then developed DM2, hx PCOS (diagnosed before her pregnancy, had skipped periods in the past, thinks might have been seen on US)  -not great at taking meds    Health Maintenance reviewed:  Lipid Profile: no  Glucose Screen: yes  Colonoscopy: no  Mammogram: no    Gynecologic History  No LMP recorded. Patient has had an implant. is having consistent periods  Contraception: Nexplanon  Last Pap: 2019. Results were: nml    Immunization History   Administered Date(s) Administered     COVID-19,PF,Moderna 05/06/2021, 06/03/2021, 01/14/2022     HepB-Adult 08/01/2019, 10/10/2019     Influenza (IIV3) PF 11/04/2018     Influenza Vaccine IM > 6 months Valent IIV4 (Alfuria,Fluzone) 11/03/2015, 10/17/2016, 11/04/2018, 10/10/2019     Pneumococcal 20 valent Conjugate (Prevnar 20) 06/16/2022     Pneumococcal 23 valent 08/01/2019     TDAP Vaccine (Adacel) 05/07/2014     Immunization status: as above.    Current Outpatient Medications   Medication Sig Dispense Refill     ACCU-CHEK GUIDE test strip U TO TEST TID       blood glucose monitoring (ACCU-CHEK FASTCLIX) lancets U UTD BID       cyclopentolate (CYCLOGYL) 1 % ophthalmic solution INSTILL 1 DROP IN BOTH EYES TWICE DAILY       etonogestrel (NEXPLANON) 68 MG IMPL 1 each by  Subdermal route once       insulin pen needle (32G X 6 MM) 32G X 6 MM miscellaneous Use one pen needle daily or as directed. 100 each 3     liraglutide (VICTOZA) 18 MG/3ML solution Inject 1.8 mg subcutaneously daily 9 mL 5     losartan (COZAAR) 25 MG tablet Take 1 tablet (25 mg) by mouth daily 90 tablet 3     metFORMIN (GLUCOPHAGE) 500 MG tablet Take 2 tablets (1,000 mg) by mouth 2 times daily (with meals) 180 tablet 1     prednisoLONE acetate (PRED FORTE) 1 % ophthalmic suspension SHAKE LIQUID AND INSTILL 1 DROP IN BOTH EYES EVERY 2 HOURS       venlafaxine (EFFEXOR XR) 150 MG 24 hr capsule Take 1 capsule (150 mg) by mouth daily 30 capsule 5     Past Medical History:   Diagnosis Date     Depressive disorder      Diabetes (H)      Nexplanon in place 2021    Left     Past Surgical History:   Procedure Laterality Date      SECTION       Patient has no known allergies.  Family History   Problem Relation Age of Onset     Diabetes Mother      Obesity Mother      Kidney Disease Father      Dialysis Father      Obesity Brother      Diabetes Maternal Grandmother      Cerebrovascular Disease Maternal Grandmother         stroke     Obesity Brother      Social History     Socioeconomic History     Marital status: Single     Spouse name: Not on file     Number of children: Not on file     Years of education: Not on file     Highest education level: Not on file   Occupational History     Not on file   Tobacco Use     Smoking status: Former Smoker     Packs/day: 0.10     Types: Cigarettes     Smokeless tobacco: Never Used   Substance and Sexual Activity     Alcohol use: Not Currently     Drug use: Not Currently     Sexual activity: Yes     Partners: Male     Birth control/protection: Implant   Other Topics Concern     Parent/sibling w/ CABG, MI or angioplasty before 65F 55M? Not Asked   Social History Narrative     Not on file     Social Determinants of Health     Financial Resource Strain: Not on file   Food  "Insecurity: Not on file   Transportation Needs: Not on file   Physical Activity: Not on file   Stress: Not on file   Social Connections: Not on file   Intimate Partner Violence: Not on file   Housing Stability: Not on file       Review of Systems negative unless noted    Objective:        Vitals:    09/20/22 0755   BP: 124/74   Pulse: 99   SpO2: 97%   Weight: 109.8 kg (242 lb)   Height: 1.683 m (5' 6.25\")     Body mass index is 38.77 kg/m .    Physical Exam:  General Appearance: Alert, pleasant, appears stated age, obese  Head: Normocephalic, without obvious abnormality  Eyes: PERRL, conjunctiva/corneas clear, EOM's intact  Ears: Normal TM's and external ear canals, both ears  Neck: Supple,without lymphadenopathy, no thyromegaly or nodules noted  Lungs: Clear to auscultation bilaterally, respirations unlabored, no wheezing or crackles  Heart: Regular rate and rhythm, no murmur   Abdomen: Soft, non-tender, no masses, no organomegaly  Extremities: Extremities with strong and symmetric pulses, no cyanosis or edema  Skin: Skin color, texture normal, no rashes or lesions  Neurologic: Grossly normal, no focal deficits  Pelvic exam: cyst noted in R groin/labia - not in typical bartholin gland cyst location, normal external genitalia, vaginal discharge noted (thick, white), normal appearing cervix    "

## 2022-09-20 ENCOUNTER — OFFICE VISIT (OUTPATIENT)
Dept: FAMILY MEDICINE | Facility: CLINIC | Age: 37
End: 2022-09-20
Attending: FAMILY MEDICINE
Payer: COMMERCIAL

## 2022-09-20 VITALS
HEIGHT: 66 IN | SYSTOLIC BLOOD PRESSURE: 124 MMHG | DIASTOLIC BLOOD PRESSURE: 74 MMHG | BODY MASS INDEX: 38.89 KG/M2 | WEIGHT: 242 LBS | HEART RATE: 99 BPM | OXYGEN SATURATION: 97 %

## 2022-09-20 DIAGNOSIS — N89.8 VAGINAL DISCHARGE: ICD-10-CM

## 2022-09-20 DIAGNOSIS — Z00.00 ROUTINE ADULT HEALTH MAINTENANCE: Primary | ICD-10-CM

## 2022-09-20 DIAGNOSIS — R80.9 MICROALBUMINURIA DUE TO TYPE 2 DIABETES MELLITUS (H): ICD-10-CM

## 2022-09-20 DIAGNOSIS — E66.01 MORBID OBESITY (H): ICD-10-CM

## 2022-09-20 DIAGNOSIS — E28.2 PCOS (POLYCYSTIC OVARIAN SYNDROME): ICD-10-CM

## 2022-09-20 DIAGNOSIS — F41.8 DEPRESSION WITH ANXIETY: ICD-10-CM

## 2022-09-20 DIAGNOSIS — Z31.9 DESIRE FOR PREGNANCY: ICD-10-CM

## 2022-09-20 DIAGNOSIS — Z12.4 CERVICAL CANCER SCREENING: ICD-10-CM

## 2022-09-20 DIAGNOSIS — Z78.9 HEPATITIS B VACCINATION STATUS UNKNOWN: ICD-10-CM

## 2022-09-20 DIAGNOSIS — K76.0 NAFLD (NONALCOHOLIC FATTY LIVER DISEASE): ICD-10-CM

## 2022-09-20 DIAGNOSIS — N89.8 VAGINAL CYST: ICD-10-CM

## 2022-09-20 DIAGNOSIS — E11.29 MICROALBUMINURIA DUE TO TYPE 2 DIABETES MELLITUS (H): ICD-10-CM

## 2022-09-20 LAB
ALBUMIN SERPL BCG-MCNC: 4.3 G/DL (ref 3.5–5.2)
ALP SERPL-CCNC: 92 U/L (ref 35–104)
ALT SERPL W P-5'-P-CCNC: 51 U/L (ref 10–35)
ANION GAP SERPL CALCULATED.3IONS-SCNC: 13 MMOL/L (ref 7–15)
AST SERPL W P-5'-P-CCNC: 43 U/L (ref 10–35)
BILIRUB SERPL-MCNC: 0.7 MG/DL
BUN SERPL-MCNC: 10.9 MG/DL (ref 6–20)
CALCIUM SERPL-MCNC: 9.4 MG/DL (ref 8.6–10)
CHLORIDE SERPL-SCNC: 97 MMOL/L (ref 98–107)
CLUE CELLS: ABNORMAL
CREAT SERPL-MCNC: 0.48 MG/DL (ref 0.51–0.95)
DEPRECATED HCO3 PLAS-SCNC: 22 MMOL/L (ref 22–29)
ERYTHROCYTE [DISTWIDTH] IN BLOOD BY AUTOMATED COUNT: 12.1 % (ref 10–15)
GFR SERPL CREATININE-BSD FRML MDRD: >90 ML/MIN/1.73M2
GLUCOSE SERPL-MCNC: 380 MG/DL (ref 70–99)
HBA1C MFR BLD: 12.3 % (ref 0–5.6)
HBV SURFACE AB SERPL IA-ACNC: 0.17 M[IU]/ML
HBV SURFACE AB SERPL IA-ACNC: NONREACTIVE M[IU]/ML
HCT VFR BLD AUTO: 42.1 % (ref 35–47)
HGB BLD-MCNC: 14.2 G/DL (ref 11.7–15.7)
MCH RBC QN AUTO: 28.5 PG (ref 26.5–33)
MCHC RBC AUTO-ENTMCNC: 33.7 G/DL (ref 31.5–36.5)
MCV RBC AUTO: 85 FL (ref 78–100)
PLATELET # BLD AUTO: 256 10E3/UL (ref 150–450)
POTASSIUM SERPL-SCNC: 4.2 MMOL/L (ref 3.4–5.3)
PROT SERPL-MCNC: 7.5 G/DL (ref 6.4–8.3)
RBC # BLD AUTO: 4.98 10E6/UL (ref 3.8–5.2)
SODIUM SERPL-SCNC: 132 MMOL/L (ref 136–145)
TRICHOMONAS, WET PREP: ABNORMAL
WBC # BLD AUTO: 9.5 10E3/UL (ref 4–11)
WBC'S/HIGH POWER FIELD, WET PREP: ABNORMAL
YEAST, WET PREP: PRESENT

## 2022-09-20 PROCEDURE — 90686 IIV4 VACC NO PRSV 0.5 ML IM: CPT | Performed by: FAMILY MEDICINE

## 2022-09-20 PROCEDURE — 90471 IMMUNIZATION ADMIN: CPT | Performed by: FAMILY MEDICINE

## 2022-09-20 PROCEDURE — 87624 HPV HI-RISK TYP POOLED RSLT: CPT | Performed by: FAMILY MEDICINE

## 2022-09-20 PROCEDURE — 87210 SMEAR WET MOUNT SALINE/INK: CPT | Performed by: FAMILY MEDICINE

## 2022-09-20 PROCEDURE — G0145 SCR C/V CYTO,THINLAYER,RESCR: HCPCS | Performed by: FAMILY MEDICINE

## 2022-09-20 PROCEDURE — 80053 COMPREHEN METABOLIC PANEL: CPT | Performed by: FAMILY MEDICINE

## 2022-09-20 PROCEDURE — 99395 PREV VISIT EST AGE 18-39: CPT | Mod: 25 | Performed by: FAMILY MEDICINE

## 2022-09-20 PROCEDURE — 36415 COLL VENOUS BLD VENIPUNCTURE: CPT | Performed by: FAMILY MEDICINE

## 2022-09-20 PROCEDURE — 86706 HEP B SURFACE ANTIBODY: CPT | Performed by: FAMILY MEDICINE

## 2022-09-20 PROCEDURE — 85027 COMPLETE CBC AUTOMATED: CPT | Performed by: FAMILY MEDICINE

## 2022-09-20 PROCEDURE — 99213 OFFICE O/P EST LOW 20 MIN: CPT | Mod: 25 | Performed by: FAMILY MEDICINE

## 2022-09-20 PROCEDURE — 83036 HEMOGLOBIN GLYCOSYLATED A1C: CPT | Performed by: FAMILY MEDICINE

## 2022-09-20 RX ORDER — FLUCONAZOLE 150 MG/1
150 TABLET ORAL ONCE
Qty: 1 TABLET | Refills: 0 | Status: SHIPPED | OUTPATIENT
Start: 2022-09-20 | End: 2022-09-20

## 2022-09-20 ASSESSMENT — ENCOUNTER SYMPTOMS
DIARRHEA: 0
HEMATURIA: 0
PARESTHESIAS: 0
SORE THROAT: 0
ABDOMINAL PAIN: 0
BREAST MASS: 0
HEADACHES: 0
HEMATOCHEZIA: 0
DYSURIA: 0
NERVOUS/ANXIOUS: 0
MYALGIAS: 0
JOINT SWELLING: 0
WEAKNESS: 0
CHILLS: 0
ARTHRALGIAS: 0
NAUSEA: 0
SHORTNESS OF BREATH: 0
PALPITATIONS: 0
FREQUENCY: 0
EYE PAIN: 0
COUGH: 0
DIZZINESS: 0
FEVER: 0
HEARTBURN: 0
CONSTIPATION: 0

## 2022-09-20 ASSESSMENT — PATIENT HEALTH QUESTIONNAIRE - PHQ9
SUM OF ALL RESPONSES TO PHQ QUESTIONS 1-9: 5
SUM OF ALL RESPONSES TO PHQ QUESTIONS 1-9: 5
10. IF YOU CHECKED OFF ANY PROBLEMS, HOW DIFFICULT HAVE THESE PROBLEMS MADE IT FOR YOU TO DO YOUR WORK, TAKE CARE OF THINGS AT HOME, OR GET ALONG WITH OTHER PEOPLE: NOT DIFFICULT AT ALL

## 2022-09-25 LAB
BKR LAB AP GYN ADEQUACY: NORMAL
BKR LAB AP GYN INTERPRETATION: NORMAL
BKR LAB AP HPV REFLEX: NORMAL
BKR LAB AP PREVIOUS ABNORMAL: NORMAL
PATH REPORT.COMMENTS IMP SPEC: NORMAL
PATH REPORT.COMMENTS IMP SPEC: NORMAL
PATH REPORT.RELEVANT HX SPEC: NORMAL

## 2022-09-27 LAB
HUMAN PAPILLOMA VIRUS 16 DNA: NEGATIVE
HUMAN PAPILLOMA VIRUS 18 DNA: NEGATIVE
HUMAN PAPILLOMA VIRUS FINAL DIAGNOSIS: NORMAL
HUMAN PAPILLOMA VIRUS OTHER HR: NEGATIVE

## 2023-01-06 ENCOUNTER — TELEPHONE (OUTPATIENT)
Dept: ENDOCRINOLOGY | Facility: CLINIC | Age: 38
End: 2023-01-06

## 2023-01-09 ENCOUNTER — E-VISIT (OUTPATIENT)
Dept: URGENT CARE | Facility: CLINIC | Age: 38
End: 2023-01-09
Payer: COMMERCIAL

## 2023-01-09 ENCOUNTER — NURSE TRIAGE (OUTPATIENT)
Dept: NURSING | Facility: CLINIC | Age: 38
End: 2023-01-09

## 2023-01-09 DIAGNOSIS — R05.9 COUGH, UNSPECIFIED TYPE: Primary | ICD-10-CM

## 2023-01-09 PROCEDURE — 99207 PR NON-BILLABLE SERV PER CHARTING: CPT | Performed by: FAMILY MEDICINE

## 2023-01-09 NOTE — PATIENT INSTRUCTIONS
Dear Jojo Mccallum,    We are sorry you are not feeling well. Based on the responses you provided, it is recommended that you be seen in-person in urgent care so we can better evaluate your symptoms. Please click here to find the nearest urgent care location to you.   You will not be charged for this Visit. Thank you for trusting us with your care.    Elen Sepulveda MD

## 2023-01-09 NOTE — TELEPHONE ENCOUNTER
Did Telehealth and they said to be seen in person. Out at least two days for appointments. Contested, mucus and rattly raspy breathing. A lot of mucus. Constantly coughing and blowing nose with sore throat on Saturday. Didn't get better. Yesterday, coughing, sneezing, sore throat, pressure in ears, muffled hearing in one ear, suddenly changed. Itchy eyes. Phlegm is yellow green coughing it up. Denies fever. Had influenza 2-3 weeks ago, son had influenza A diagnosed. She is negative for coronavirus. She will go to urgent care today.  Radha Cavazos RN  [facility]      Reason for Disposition    Wheezing is present    Additional Information    Negative: Bluish (or gray) lips or face    Negative: SEVERE difficulty breathing (e.g., struggling for each breath, speaks in single words)    Negative: Rapid onset of cough and has hives    Negative: Coughing started suddenly after medicine, an allergic food or bee sting    Negative: Difficulty breathing after exposure to flames, smoke, or fumes    Negative: Sounds like a life-threatening emergency to the triager    Negative: Previous asthma attacks and this feels like asthma attack    Negative: Dry cough (non-productive; no sputum or minimal clear sputum) and within 14 days of COVID-19 Exposure    Negative: MODERATE difficulty breathing (e.g., speaks in phrases, SOB even at rest, pulse 100-120) and still present when not coughing    Negative: Chest pain present when not coughing    Negative: Passed out (i.e., fainted, collapsed and was not responding)    Negative: Patient sounds very sick or weak to the triager    Negative: MILD difficulty breathing (e.g., minimal/no SOB at rest, SOB with walking, pulse <100) and still present when not coughing    Negative: Coughed up > 1 tablespoon (15 ml) blood (Exception: Blood-tinged sputum.)    Negative: Fever > 103 F (39.4 C)    Negative: Fever > 101 F (38.3 C) and over 60 years of age    Negative: Fever > 100.0 F (37.8 C)  and has diabetes mellitus or a weak immune system (e.g., HIV positive, cancer chemotherapy, organ transplant, splenectomy, chronic steroids)    Negative: Fever > 100.0 F (37.8 C) and bedridden (e.g., nursing home patient, stroke, chronic illness, recovering from surgery)    Negative: Increasing ankle swelling    Protocols used: COUGH-A-OH

## 2023-01-10 ENCOUNTER — VIRTUAL VISIT (OUTPATIENT)
Dept: ENDOCRINOLOGY | Facility: CLINIC | Age: 38
End: 2023-01-10
Attending: FAMILY MEDICINE
Payer: COMMERCIAL

## 2023-01-10 DIAGNOSIS — E11.59 HYPERTENSION ASSOCIATED WITH TYPE 2 DIABETES MELLITUS (H): ICD-10-CM

## 2023-01-10 DIAGNOSIS — K76.0 NAFLD (NONALCOHOLIC FATTY LIVER DISEASE): ICD-10-CM

## 2023-01-10 DIAGNOSIS — E78.5 HYPERLIPIDEMIA DUE TO TYPE 2 DIABETES MELLITUS (H): ICD-10-CM

## 2023-01-10 DIAGNOSIS — E11.65 TYPE 2 DIABETES MELLITUS WITH HYPERGLYCEMIA, WITHOUT LONG-TERM CURRENT USE OF INSULIN (H): Primary | ICD-10-CM

## 2023-01-10 DIAGNOSIS — E11.29 MICROALBUMINURIA DUE TO TYPE 2 DIABETES MELLITUS (H): ICD-10-CM

## 2023-01-10 DIAGNOSIS — I15.2 HYPERTENSION ASSOCIATED WITH TYPE 2 DIABETES MELLITUS (H): ICD-10-CM

## 2023-01-10 DIAGNOSIS — R05.1 ACUTE COUGH: ICD-10-CM

## 2023-01-10 DIAGNOSIS — R80.9 MICROALBUMINURIA DUE TO TYPE 2 DIABETES MELLITUS (H): ICD-10-CM

## 2023-01-10 DIAGNOSIS — E11.69 HYPERLIPIDEMIA DUE TO TYPE 2 DIABETES MELLITUS (H): ICD-10-CM

## 2023-01-10 PROCEDURE — 99204 OFFICE O/P NEW MOD 45 MIN: CPT | Mod: 95 | Performed by: INTERNAL MEDICINE

## 2023-01-10 RX ORDER — METFORMIN HCL 500 MG
2000 TABLET, EXTENDED RELEASE 24 HR ORAL
Qty: 360 TABLET | Refills: 3 | Status: SHIPPED | OUTPATIENT
Start: 2023-01-10

## 2023-01-10 RX ORDER — ALBUTEROL SULFATE 90 UG/1
2 AEROSOL, METERED RESPIRATORY (INHALATION) EVERY 6 HOURS PRN
Qty: 18 G | Refills: 1 | Status: SHIPPED | OUTPATIENT
Start: 2023-01-10

## 2023-01-10 RX ORDER — ROSUVASTATIN CALCIUM 10 MG/1
10 TABLET, COATED ORAL DAILY
Qty: 90 TABLET | Refills: 3 | Status: SHIPPED | OUTPATIENT
Start: 2023-01-10

## 2023-01-10 RX ORDER — SEMAGLUTIDE 1.34 MG/ML
INJECTION, SOLUTION SUBCUTANEOUS
Qty: 4.5 ML | Refills: 3 | Status: SHIPPED | OUTPATIENT
Start: 2023-01-10

## 2023-01-10 NOTE — PROGRESS NOTES
Jojo Mccallum  is being evaluated via a billable video visit.      How would you like to obtain your AVS? [facility]  For the video visit, send the invitation by: Text to cell phone: [phone]  Will anyone else be joining your video visit? No

## 2023-01-10 NOTE — PROGRESS NOTES
Endocrine Video Consult note    Attending Assessment/Plan :        Microalbuminuria due to type 2 diabetes mellitus (H)  Type 2 diabetes mellitus with hyperglycemia, without long-term current use of insulin (H)  Acute cough  Hyperlipidemia due to type 2 diabetes mellitus (H)  Hypertension associated with type 2 diabetes mellitus (H)  NAFLD (nonalcoholic fatty liver disease)  Assessment:  -above goal a1c of <7% without hypoglycemia  -would benefit from starting insulin today given degree of a1c elevation  -pt had better tolerance of ozempic in the past than victoza  -would benefit from trial of metformin XR instead of IR for improvement in GI side effects  -would benefit from several diabetic med changes but will start with above changes for today.  At future visit will consider addition of SGLT-2i when a1c is better, and/or pioglitazone given HLD and WILSON    Plan:  -start lantus 20 units and start self titration plan (2 units q3 days to get and maintain -180 as initial goal without hypos).  Counseled pt on plan and hypoglycemia mitigation  -switch metformin to extended release at same dose  -switch from victoza to ozempic(0.25 x 4 weeks then 0.5 until next followup)  -send albuterol for acute cough for current vital illness.  May need steroid inhaler too but recommended she see PCP or urgent care if albuterol insufficient  -ordered lipid panel and will start on crestor 10  -check a1c to get new baseline and again in 3 months just prior to followup  -check c-peptide and GABE given minimal reported family hx of DM    I have independently reviewed and interpreted labs, imaging as indicated.    RTC 3 months    Chief complaint:  Jojo is a 37 year old female seen in consultation for type 2 diabetes, uncontrolled      HISTORY OF PRESENT ILLNESS    Jojo is a 37F referred to endocrine for management of t2dm.  She is also suffering from acute non-covid URI today.     Diabetes was first diagnosed gestational 2014,  then she was restarted on tx in [id number] when she presented with symptoms of polyuria polydipsia and unintentional weight loss.     Current regimen:  Metformin immediate release 1gm BID  Liraglutide 1.8mg daily - hasn't been taking lately due to side effects.     She endorses GI issues with IR metformin.  Victoza also causes quite a bit of GI issues.  In the past she had been on ozempic and reports tolerating this better.       Endocrine relevant labs and/or imaging are as follows:  Component      Latest Ref Rng & Units 3/11/2022 6/16/2022 9/20/2022   Cholesterol      <=199 mg/dL  225 (H)    Triglycerides      <=149 mg/dL  402 (H)    HDL Cholesterol      >=50 mg/dL  40 (L)    LDL Cholesterol Calculated            Microalbumin Urine mg/dL      0.00 - 1.99 mg/dL 2.35 (H)     Creatinine Urine      mg/dL 42     Microalbumin Urine mg/g Cr      <=19.9 mg/g Cr 56.0 (H)     LDL Cholesterol Direct      <=129 mg/dL  145 (H)    Hemoglobin A1C      0.0 - 5.6 %   12.3 (H)     REVIEW OF SYSTEMS    10 system ROS otherwise as per the HPI or negative    Past Medical History  Past Medical History:   Diagnosis Date     Depressive disorder      Diabetes (H)      Nexplanon in place 05/25/2021    Left       Medications  Current Outpatient Medications   Medication Sig Dispense Refill     ACCU-CHEK GUIDE test strip U TO TEST TID       blood glucose monitoring (ACCU-CHEK FASTCLIX) lancets U UTD BID       cyclopentolate (CYCLOGYL) 1 % ophthalmic solution INSTILL 1 DROP IN BOTH EYES TWICE DAILY       etonogestrel (NEXPLANON) 68 MG IMPL 1 each by Subdermal route once       insulin pen needle (32G X 6 MM) 32G X 6 MM miscellaneous Use one pen needle daily or as directed. 100 each 3     liraglutide (VICTOZA) 18 MG/3ML solution Inject 1.8 mg subcutaneously daily 9 mL 5     losartan (COZAAR) 25 MG tablet Take 1 tablet (25 mg) by mouth daily 90 tablet 3     metFORMIN (GLUCOPHAGE) 500 MG tablet Take 2 tablets (1,000 mg) by mouth 2 times daily (with  meals) 180 tablet 1     prednisoLONE acetate (PRED FORTE) 1 % ophthalmic suspension SHAKE LIQUID AND INSTILL 1 DROP IN BOTH EYES EVERY 2 HOURS       venlafaxine (EFFEXOR XR) 150 MG 24 hr capsule Take 1 capsule (150 mg) by mouth daily 30 capsule 5       Allergies  No Known Allergies      Family History  family history includes Cerebrovascular Disease in her maternal grandmother; Diabetes in her maternal grandmother and mother; Dialysis in her father; Kidney Disease in her father; Obesity in her brother, brother, and mother.    Social History  Social History     Tobacco Use     Smoking status: Former     Packs/day: 0.10     Types: Cigarettes     Smokeless tobacco: Never   Substance Use Topics     Alcohol use: Not Currently     Drug use: Not Currently       Physical Exam  There were no vitals taken for this visit.  There is no height or weight on file to calculate BMI.    Physical Exam    GENERAL :  In no apparent distress  SKIN: Normal color    EYES: EOMI, No scleral icterus  NECK: No visible masses  RESP: sounds grossly congested, frequent cough  ABDOMEN: flat, nondistended       NEURO: awake, alert, responds appropriately to questions   EXTREMITIES: No clubbing, cyanosis or edema.    Video-Visit Details    Type of service:  Video Visit    Video Start Time (time video started): 1230    Video End Time (time video stopped): 1315    Originating Location (pt. Location): Home    Distant Location (provider location):  Off-site    Mode of Communication:  Video Conference via [facility]    Jacobo Starkey MD      I spent a total of 48 minutes on the day of this new patient visit on chart review, lab review, coordinating care, exam and counseling of patient

## 2023-01-10 NOTE — LETTER
1/10/2023         RE: Jojo Mccallum  [address]  [facility] [address]        Dear Colleague,    Thank you for referring your patient, Jojo Mccallum, to the [facility] ENDOCRINOLOGY. Please see a copy of my visit note below.    Outcome for 01/06/23 3:11 PM: Patient is not checking blood sugars  Evelyn Ames, VF        Jojo Mccallum  is being evaluated via a billable video visit.      How would you like to obtain your AVS? [employer]  For the video visit, send the invitation by: Text to cell phone: [phone]  Will anyone else be joining your video visit? No          Endocrine Video Consult note    Attending Assessment/Plan :        Microalbuminuria due to type 2 diabetes mellitus (H)  Type 2 diabetes mellitus with hyperglycemia, without long-term current use of insulin (H)  Acute cough  Hyperlipidemia due to type 2 diabetes mellitus (H)  Hypertension associated with type 2 diabetes mellitus (H)  NAFLD (nonalcoholic fatty liver disease)  Assessment:  -above goal a1c of <7% without hypoglycemia  -would benefit from starting insulin today given degree of a1c elevation  -pt had better tolerance of ozempic in the past than victoza  -would benefit from trial of metformin XR instead of IR for improvement in GI side effects  -would benefit from several diabetic med changes but will start with above changes for today.  At future visit will consider addition of SGLT-2i when a1c is better, and/or pioglitazone given HLD and WILSON    Plan:  -start lantus 20 units and start self titration plan (2 units q3 days to get and maintain -180 as initial goal without hypos).  Counseled pt on plan and hypoglycemia mitigation  -switch metformin to extended release at same dose  -switch from victoza to ozempic(0.25 x 4 weeks then 0.5 until next followup)  -send albuterol for acute cough for current vital illness.  May need steroid inhaler too but recommended she see PCP or urgent care if albuterol  insufficient  -ordered lipid panel and will start on crestor 10  -check a1c to get new baseline and again in 3 months just prior to followup  -check c-peptide and GABE given minimal reported family hx of DM    I have independently reviewed and interpreted labs, imaging as indicated.    RTC 3 months    Chief complaint:  Jojo is a 37 year old female seen in consultation for type 2 diabetes, uncontrolled      HISTORY OF PRESENT ILLNESS    Jojo is a 37F referred to endocrine for management of t2dm.  She is also suffering from acute non-covid URI today.     Diabetes was first diagnosed gestational 2014, then she was restarted on tx in [id number] when she presented with symptoms of polyuria polydipsia and unintentional weight loss.     Current regimen:  Metformin immediate release 1gm BID  Liraglutide 1.8mg daily - hasn't been taking lately due to side effects.     She endorses GI issues with IR metformin.  Victoza also causes quite a bit of GI issues.  In the past she had been on ozempic and reports tolerating this better.       Endocrine relevant labs and/or imaging are as follows:  Component      Latest Ref Rng & Units 3/11/2022 6/16/2022 9/20/2022   Cholesterol      <=199 mg/dL  225 (H)    Triglycerides      <=149 mg/dL  402 (H)    HDL Cholesterol      >=50 mg/dL  40 (L)    LDL Cholesterol Calculated            Microalbumin Urine mg/dL      0.00 - 1.99 mg/dL 2.35 (H)     Creatinine Urine      mg/dL 42     Microalbumin Urine mg/g Cr      <=19.9 mg/g Cr 56.0 (H)     LDL Cholesterol Direct      <=129 mg/dL  145 (H)    Hemoglobin A1C      0.0 - 5.6 %   12.3 (H)     REVIEW OF SYSTEMS    10 system ROS otherwise as per the HPI or negative    Past Medical History  Past Medical History:   Diagnosis Date     Depressive disorder      Diabetes (H)      Nexplanon in place 05/25/2021    Left       Medications  Current Outpatient Medications   Medication Sig Dispense Refill     ACCU-CHEK GUIDE test strip U TO TEST TID       blood  glucose monitoring (ACCU-CHEK FASTCLIX) lancets U UTD BID       cyclopentolate (CYCLOGYL) 1 % ophthalmic solution INSTILL 1 DROP IN BOTH EYES TWICE DAILY       etonogestrel (NEXPLANON) 68 MG IMPL 1 each by Subdermal route once       insulin pen needle (32G X 6 MM) 32G X 6 MM miscellaneous Use one pen needle daily or as directed. 100 each 3     liraglutide (VICTOZA) 18 MG/3ML solution Inject 1.8 mg subcutaneously daily 9 mL 5     losartan (COZAAR) 25 MG tablet Take 1 tablet (25 mg) by mouth daily 90 tablet 3     metFORMIN (GLUCOPHAGE) 500 MG tablet Take 2 tablets (1,000 mg) by mouth 2 times daily (with meals) 180 tablet 1     prednisoLONE acetate (PRED FORTE) 1 % ophthalmic suspension SHAKE LIQUID AND INSTILL 1 DROP IN BOTH EYES EVERY 2 HOURS       venlafaxine (EFFEXOR XR) 150 MG 24 hr capsule Take 1 capsule (150 mg) by mouth daily 30 capsule 5       Allergies  No Known Allergies      Family History  family history includes Cerebrovascular Disease in her maternal grandmother; Diabetes in her maternal grandmother and mother; Dialysis in her father; Kidney Disease in her father; Obesity in her brother, brother, and mother.    Social History  Social History     Tobacco Use     Smoking status: Former     Packs/day: 0.10     Types: Cigarettes     Smokeless tobacco: Never   Substance Use Topics     Alcohol use: Not Currently     Drug use: Not Currently       Physical Exam  There were no vitals taken for this visit.  There is no height or weight on file to calculate BMI.    Physical Exam    GENERAL :  In no apparent distress  SKIN: Normal color    EYES: EOMI, No scleral icterus  NECK: No visible masses  RESP: sounds grossly congested, frequent cough  ABDOMEN: flat, nondistended       NEURO: awake, alert, responds appropriately to questions   EXTREMITIES: No clubbing, cyanosis or edema.    Video-Visit Details    Type of service:  Video Visit    Video Start Time (time video started): 1230    Video End Time (time video  stopped): 1315    Originating Location (pt. Location): Home    Distant Location (provider location):  Off-site    Mode of Communication:  Video Conference via [employer]    Jacobo Starkey MD      I spent a total of 48 minutes on the day of this new patient visit on chart review, lab review, coordinating care, exam and counseling of patient      Again, thank you for allowing me to participate in the care of your patient.        Sincerely,        Jacobo Starkey MD

## 2023-01-10 NOTE — NURSING NOTE
"PT not feeling the best, fatigue, cough, hurts to breath. PT states she is not struggling to breath no chest pain, did not want to talk to  triage. But wants to know what's going on. Severe \"itch\" feeling in her throat. Pt & Son just got over Flu A   "

## 2023-01-10 NOTE — PATIENT INSTRUCTIONS
How to self-adjust long-acting insulin    Check morning fasting blood sugar first thing in the morning daily  Adjust lantus dose every 3 days based on morning blood sugar:  If average is over 180, increase insulin dose by 2 units going forward  If average is 130-179, continue current dose  If average is less than 130 with no lows, decrease dose by 2 units  If you have ANY blood sugars below 70, decrease dose by 2 units immediately (do not wait a few days to change in this case)       ----------------------------------------------------------------------    Other changes made today:  Stop metformin immediate release and change to metformin extended release at same dose (2000mg per day)  Start lantus and adjust according to above instructions  Stop victoza and replace with Ozempic  Come in for labs in the next week or so when able  Come in for repeat a1c in 3 months right before your followup visit

## 2023-01-29 ENCOUNTER — HEALTH MAINTENANCE LETTER (OUTPATIENT)
Age: 38
End: 2023-01-29

## 2023-03-01 ENCOUNTER — TELEPHONE (OUTPATIENT)
Dept: ENDOCRINOLOGY | Facility: CLINIC | Age: 38
End: 2023-03-01
Payer: COMMERCIAL

## 2023-03-01 NOTE — TELEPHONE ENCOUNTER
Prior Authorization Retail Medication Request    Medication/Dose: VENTOLIN HFA INH W/DOS  PUFFS  ICD code (if different than what is on RX):    Previously Tried and Failed:    Rationale:      Insurance Name:  [employer] [facility]   Insurance ID:  [id number]       Pharmacy Information (if different than what is on RX)  Name:  [place]   Phone:  [phone]

## 2023-03-06 NOTE — TELEPHONE ENCOUNTER
Central Prior Authorization Team   Phone: [phone]      PA Initiation    Medication: VENTOLIN HFA INH W/DOS  PUFFS-PA NOT NEEDED  Insurance Company: [employer] - Phone [phone] Fax [phone]  Pharmacy Filling the Rx: [id number] - [place], [place] - [facility] [address] AT [facility] [address]  Filling Pharmacy Phone: [phone]  Filling Pharmacy Fax:    Start Date: 3/6/2023    Called pharmacy to see if patient is requesting Brand name Ventolin.  Per tech, someone put on not to substitute last month since Ventolin was covered but now since it's not they are able to remove that request and process for the generic and get a paid claim.  **Instructed pharmacy to notify patient when script is ready to /ship.**

## 2023-03-23 ENCOUNTER — TRANSFERRED RECORDS (OUTPATIENT)
Dept: HEALTH INFORMATION MANAGEMENT | Facility: CLINIC | Age: 38
End: 2023-03-23

## 2023-05-08 ENCOUNTER — HEALTH MAINTENANCE LETTER (OUTPATIENT)
Age: 38
End: 2023-05-08

## 2023-08-21 ENCOUNTER — PATIENT OUTREACH (OUTPATIENT)
Dept: CARE COORDINATION | Facility: CLINIC | Age: 38
End: 2023-08-21
Payer: COMMERCIAL

## 2023-09-04 ENCOUNTER — PATIENT OUTREACH (OUTPATIENT)
Dept: CARE COORDINATION | Facility: CLINIC | Age: 38
End: 2023-09-04
Payer: COMMERCIAL

## 2023-10-08 ENCOUNTER — HEALTH MAINTENANCE LETTER (OUTPATIENT)
Age: 38
End: 2023-10-08

## 2023-12-17 ENCOUNTER — HEALTH MAINTENANCE LETTER (OUTPATIENT)
Age: 38
End: 2023-12-17

## 2024-02-25 ENCOUNTER — HEALTH MAINTENANCE LETTER (OUTPATIENT)
Age: 39
End: 2024-02-25

## 2024-05-23 ENCOUNTER — TRANSFERRED RECORDS (OUTPATIENT)
Dept: HEALTH INFORMATION MANAGEMENT | Facility: CLINIC | Age: 39
End: 2024-05-23
Payer: COMMERCIAL

## 2024-07-14 ENCOUNTER — HEALTH MAINTENANCE LETTER (OUTPATIENT)
Age: 39
End: 2024-07-14

## 2024-08-06 ENCOUNTER — TRANSFERRED RECORDS (OUTPATIENT)
Dept: HEALTH INFORMATION MANAGEMENT | Facility: CLINIC | Age: 39
End: 2024-08-06
Payer: COMMERCIAL

## 2024-11-05 ENCOUNTER — OFFICE VISIT (OUTPATIENT)
Dept: FAMILY MEDICINE | Facility: CLINIC | Age: 39
End: 2024-11-05
Payer: COMMERCIAL

## 2024-11-05 VITALS
HEART RATE: 93 BPM | OXYGEN SATURATION: 99 % | SYSTOLIC BLOOD PRESSURE: 134 MMHG | RESPIRATION RATE: 15 BRPM | DIASTOLIC BLOOD PRESSURE: 82 MMHG | TEMPERATURE: 97.6 F | BODY MASS INDEX: 37 KG/M2 | WEIGHT: 231 LBS

## 2024-11-05 DIAGNOSIS — E11.29 MICROALBUMINURIA DUE TO TYPE 2 DIABETES MELLITUS (H): ICD-10-CM

## 2024-11-05 DIAGNOSIS — Z23 IMMUNIZATION DUE: ICD-10-CM

## 2024-11-05 DIAGNOSIS — E11.65 TYPE 2 DIABETES MELLITUS WITH HYPERGLYCEMIA, WITHOUT LONG-TERM CURRENT USE OF INSULIN (H): Primary | ICD-10-CM

## 2024-11-05 DIAGNOSIS — R80.9 MICROALBUMINURIA DUE TO TYPE 2 DIABETES MELLITUS (H): ICD-10-CM

## 2024-11-05 LAB
EST. AVERAGE GLUCOSE BLD GHB EST-MCNC: 298 MG/DL
HBA1C MFR BLD: 12 % (ref 0–5.6)
HOLD SPECIMEN: NORMAL

## 2024-11-05 PROCEDURE — 90656 IIV3 VACC NO PRSV 0.5 ML IM: CPT

## 2024-11-05 PROCEDURE — 90714 TD VACC NO PRESV 7 YRS+ IM: CPT

## 2024-11-05 PROCEDURE — 99214 OFFICE O/P EST MOD 30 MIN: CPT | Mod: 25

## 2024-11-05 PROCEDURE — 80061 LIPID PANEL: CPT

## 2024-11-05 PROCEDURE — 90472 IMMUNIZATION ADMIN EACH ADD: CPT

## 2024-11-05 PROCEDURE — 80053 COMPREHEN METABOLIC PANEL: CPT

## 2024-11-05 PROCEDURE — 83036 HEMOGLOBIN GLYCOSYLATED A1C: CPT

## 2024-11-05 PROCEDURE — 82043 UR ALBUMIN QUANTITATIVE: CPT

## 2024-11-05 PROCEDURE — 82570 ASSAY OF URINE CREATININE: CPT

## 2024-11-05 PROCEDURE — 91320 SARSCV2 VAC 30MCG TRS-SUC IM: CPT

## 2024-11-05 PROCEDURE — 90471 IMMUNIZATION ADMIN: CPT

## 2024-11-05 PROCEDURE — 36415 COLL VENOUS BLD VENIPUNCTURE: CPT

## 2024-11-05 PROCEDURE — 82248 BILIRUBIN DIRECT: CPT

## 2024-11-05 PROCEDURE — 90480 ADMN SARSCOV2 VAC 1/ONLY CMP: CPT

## 2024-11-05 RX ORDER — METFORMIN HYDROCHLORIDE 500 MG/1
2000 TABLET, EXTENDED RELEASE ORAL
Qty: 360 TABLET | Refills: 3 | Status: SHIPPED | OUTPATIENT
Start: 2024-11-05

## 2024-11-05 RX ORDER — ROSUVASTATIN CALCIUM 10 MG/1
10 TABLET, COATED ORAL DAILY
Qty: 90 TABLET | Refills: 3 | Status: SHIPPED | OUTPATIENT
Start: 2024-11-05

## 2024-11-05 RX ORDER — LANCETS
EACH MISCELLANEOUS
Qty: 100 EACH | Refills: 6 | Status: SHIPPED | OUTPATIENT
Start: 2024-11-05

## 2024-11-05 ASSESSMENT — PAIN SCALES - GENERAL: PAINLEVEL_OUTOF10: NO PAIN (0)

## 2024-11-05 ASSESSMENT — PATIENT HEALTH QUESTIONNAIRE - PHQ9
10. IF YOU CHECKED OFF ANY PROBLEMS, HOW DIFFICULT HAVE THESE PROBLEMS MADE IT FOR YOU TO DO YOUR WORK, TAKE CARE OF THINGS AT HOME, OR GET ALONG WITH OTHER PEOPLE: SOMEWHAT DIFFICULT
SUM OF ALL RESPONSES TO PHQ QUESTIONS 1-9: 12
SUM OF ALL RESPONSES TO PHQ QUESTIONS 1-9: 12

## 2024-11-05 NOTE — PATIENT INSTRUCTIONS
Metformin titration:   Week 1: 500 mg once a day (currently at 500 mg once daily)  Week 2: 500 mg twice a day  Week 3: 500 mg in the morning, 1000 mg in the evening for a total of 3 tablets per day,   Week 4: 1000 mg in the morning, 1000 mg in the evening, total of 4 tablets a day   Discussed GI side effects of medication.

## 2024-11-05 NOTE — PROGRESS NOTES
Prior to immunization administration, verified patients identity using patient s name and date of birth. Please see Immunization Activity for additional information.     Screening Questionnaire for Adult Immunization    Are you sick today?   No   Do you have allergies to medications, food, a vaccine component or latex?   No   Have you ever had a serious reaction after receiving a vaccination?   No   Do you have a long-term health problem with heart, lung, kidney, or metabolic disease (e.g., diabetes), asthma, a blood disorder, no spleen, complement component deficiency, a cochlear implant, or a spinal fluid leak?  Are you on long-term aspirin therapy?   Yes diabetes. Okay to give   Do you have cancer, leukemia, HIV/AIDS, or any other immune system problem?   No   Do you have a parent, brother, or sister with an immune system problem?   No   In the past 3 months, have you taken medications that affect  your immune system, such as prednisone, other steroids, or anticancer drugs; drugs for the treatment of rheumatoid arthritis, Crohn s disease, or psoriasis; or have you had radiation treatments?   No   Have you had a seizure, or a brain or other nervous system problem?   No   During the past year, have you received a transfusion of blood or blood    products, or been given immune (gamma) globulin or antiviral drug?   No   For women: Are you pregnant or is there a chance you could become       pregnant during the next month?   No   Have you received any vaccinations in the past 4 weeks?   No     Immunization questionnaire answers were all negative.      Patient instructed to remain in clinic for 15 minutes afterwards, and to report any adverse reactions.     Screening performed by Kodak Mccullough MA on 11/5/2024 at 5:00 PM.   Answers submitted by the patient for this visit:  Patient Health Questionnaire (Submitted on 11/5/2024)  If you checked off any problems, how difficult have these problems made it for you to do  your work, take care of things at home, or get along with other people?: Somewhat difficult  PHQ9 TOTAL SCORE: 12  Diabetes Visit (Submitted on 11/5/2024)  Chief Complaint: Chronic problems general questions HPI Form  Frequency of checking blood sugars:: not at all  Diabetic concerns:: blood sugar frequently over 200  Paraesthesia present:: numbness in feet, weight gain  General Questionnaire (Submitted on 11/5/2024)  Chief Complaint: Chronic problems general questions HPI Form  How many servings of fruits and vegetables do you eat daily?: 0-1  On average, how many sweetened beverages do you drink each day (Examples: soda, juice, sweet tea, etc.  Do NOT count diet or artificially sweetened beverages)?: 3  How many minutes a day do you exercise enough to make your heart beat faster?: 9 or less  How many days a week do you exercise enough to make your heart beat faster?: 3 or less  How many days per week do you miss taking your medication?: 7  What makes it hard for you to take your medication every day?: side effects, remembering to take  Questionnaire about: Chronic problems general questions HPI Form (Submitted on 11/5/2024)  Chief Complaint: Chronic problems general questions HPI Form

## 2024-11-05 NOTE — PROGRESS NOTES
Assessment & Plan     Type 2 diabetes mellitus with hyperglycemia, without long-term current use of insulin (H)  -A1c 12.0%.  Previous noncompliance with diabetic medication administration.  Discussed importance of daily medication administration.   -Will restart patient on metformin, provided titration schedule in AVS, metformin 1000 mg twice daily.  -Will start patient on GLP-1 Ozempic 0.25 mg for management of glucose, adjunct to weight loss, and CAD prevention.  -Provided printed S&S of hypo- and hyperglycemia.  -Discussed importance of lifestyle modifications through dietary habits, weight reduction, and physical exercise.  -Blood pressure 134/82.  Will check metabolic panel.  -Microfilament foot exam normal, no concerns of peripheral neuropathy, no evidence of diabetic foot ulcers.  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  -Will check urine albumin, if evidence of urine albuminuria will start ACE inhibitor.  -Placed referral to diabetic educator.  Patient would like CGM, advised patient to discuss with diabetic educator.  -Placed referral to endocrinology.    -Prescribed rosuvastatin 10 mg for LDL reduction and CAD prevention.    -Will check hepatic function related to NAFLD  -Blood glucose meter and supplies ordered.   -A1c GOAL <7%.    -Follow up in 3 months or sooner if needed.     - Hemoglobin A1c  - Albumin Random Urine Quantitative with Creat Ratio  - Lipid panel reflex to direct LDL Non-fasting  - BASIC METABOLIC PANEL  - Extra Tube  - Hepatic panel (Albumin, ALT, AST, Bili, Alk Phos, TP)     Immunization due  - COVID-19 12+ (PFIZER)   - INFLUENZA VACCINE,SPLIT VIRUS,TRIVALENT,PF(FLUZONE)   - TD,PF 7+(TENIVAC)      Depression Screening Follow Up      11/5/2024     3:39 PM   PHQ   PHQ-9 Total Score 12    Q9: Thoughts of better off dead/self-harm past 2 weeks Not at all        Patient-reported         11/5/2024     3:39 PM   Last PHQ-9   1.  Little interest or  pleasure in doing things 2    2.  Feeling down, depressed, or hopeless 1    3.  Trouble falling or staying asleep, or sleeping too much 1    4.  Feeling tired or having little energy 2    5.  Poor appetite or overeating 2    6.  Feeling bad about yourself 0    7.  Trouble concentrating 3    8.  Moving slowly or restless 1    Q9: Thoughts of better off dead/self-harm past 2 weeks 0    PHQ-9 Total Score 12        Patient-reported     Follow Up Actions Taken  Depression not addressed today.       Jesus Uribe is a 39 year old, presenting for the following health issues:  Diabetes      11/5/2024     3:52 PM   Additional Questions   Roomed by Tanisha GALINDO     Patient is a 39-year-old female presenting for diabetes management.  Medical history includes history of uncontrolled type 2 diabetes, PCOS, panuveitis of both eyes, obesity,  NAFLD, microalbuminuria, hypertension, hyperlipidemia, depression anxiety.  This is a first time seeing this patient.    2014 diagnosed with gestational diabetes.  2017 diagnosed with type 2 diabetes.  After diagnosis, started metformin and Ozempic for diabetes management.  Started on losartan for microabuminuria.  Patient reports lapse in insurance (2021) and was unable to obtain any medications for months-unsure how long.  Patient was able to restarted diabetic medication sometime in 2022 but reports not taking medications consistently.  She had virutal visit with endocrinology 1/2023.  Treatment plan included lantus 20 units (2 units q3 days to get and maintain -180 as initial goal without hypos), metformin ER, ozempic weekly injection, rosuvastatin.  Does not remember injection lantus or taking rosuvastatin.  Occasionally would take metformin but would miss doses frequently.  Took Ozempic short term.  Does not like finger pokes and did not regularly check blood glucose.  Patient did not have any further follow-up for diabetes.  Does see a retinal specialist for panuveitis  of both eyes, last exam 9/2024.  Patient does report lower leg foot tingling but usually associated when she is sitting on her foot for prolonged amount of time.  Denies any weight change, fatigue, blurred vision, chest pain, palpitations, peripheral edema, dyspnea, nausea, vomiting, diarrhea, headaches, dizziness, syncope.  No polyuria or polydipsia.  No hospitalizations related to DKA in the past few years.     Metformin- tolerated when taken twice daily, previously took 2000 mg in the morning which caused GI upset  Ozempic- tolerated, did initially have constipation which resolved  Lisinopril- tolerated  Losartan- reports side effect of nausea and vomiting (contains lactase and patient is lactose intolerant)   Victoza- reports side effect of nausea and vomiting  Lantus- unsure if she tolerated medication, actually does not remember taking medication  Rosuvastatin- does not remember taking medication     Hemoglobin A1C   Date Value Ref Range Status   11/05/2024 12.0 (H) 0.0 - 5.6 % Final     Comment:     Normal <5.7%   Prediabetes 5.7-6.4%    Diabetes 6.5% or higher     Note: Adopted from ADA consensus guidelines.   09/20/2022 12.3 (H) 0.0 - 5.6 % Final     Comment:     Normal <5.7%   Prediabetes 5.7-6.4%    Diabetes 6.5% or higher     Note: Adopted from ADA consensus guidelines.   06/16/2022 10.8 (H) 0.0 - 5.6 % Final     Comment:     Normal <5.7%   Prediabetes 5.7-6.4%    Diabetes 6.5% or higher     Note: Adopted from ADA consensus guidelines.   06/03/2021 10.2 (H) 0 - 5.6 % Final     Comment:     Normal <5.7% Prediabetes 5.7-6.4%  Diabetes 6.5% or higher - adopted from ADA   consensus guidelines.     04/08/2021 10.0 (H) 0 - 5.6 % Final     Comment:     Normal <5.7% Prediabetes 5.7-6.4%  Diabetes 6.5% or higher - adopted from ADA   consensus guidelines.  Results confirmed by repeat test     02/10/2020 7.0 (H) 0 - 5.6 % Final     Comment:     Normal <5.7% Prediabetes 5.7-6.4%  Diabetes 6.5% or higher - adopted  from ADA   consensus guidelines.       Review of Systems  Review of systems negative otherwise known HPI.    Past Medical History:   Diagnosis Date    Depressive disorder     Diabetes (H)     Nexplanon in place 2021    Left       Past Surgical History:   Procedure Laterality Date     SECTION         Family History   Problem Relation Age of Onset    Diabetes Mother     Obesity Mother     Kidney Disease Father     Dialysis Father     Obesity Brother     Diabetes Maternal Grandmother     Cerebrovascular Disease Maternal Grandmother         stroke    Obesity Brother        Social History     Tobacco Use    Smoking status: Former     Current packs/day: 0.10     Types: Cigarettes    Smokeless tobacco: Never   Substance Use Topics    Alcohol use: Not Currently     Current Outpatient Medications   Medication Sig Dispense Refill    albuterol (PROAIR HFA/PROVENTIL HFA/VENTOLIN HFA) 108 (90 Base) MCG/ACT inhaler Inhale 2 puffs into the lungs every 6 hours as needed for shortness of breath, wheezing or cough 18 g 1    cyclopentolate (CYCLOGYL) 1 % ophthalmic solution INSTILL 1 DROP IN BOTH EYES TWICE DAILY      etonogestrel (NEXPLANON) 68 MG IMPL 1 each by Subdermal route once      prednisoLONE acetate (PRED FORTE) 1 % ophthalmic suspension SHAKE LIQUID AND INSTILL 1 DROP IN BOTH EYES EVERY 2 HOURS      ACCU-CHEK GUIDE test strip U TO TEST TID (Patient not taking: Reported on 2024)      blood glucose monitoring (ACCU-CHEK FASTCLIX) lancets U UTD BID (Patient not taking: Reported on 2024)      insulin glargine (LANTUS PEN) 100 UNIT/ML pen Inject 20 units daily and adjust according to instructions.  Max TDD 50 (Patient not taking: Reported on 2024) 30 mL 3    insulin pen needle (32G X 6 MM) 32G X 6 MM miscellaneous Use one pen needle daily or as directed. (Patient not taking: Reported on 2024) 100 each 3    losartan (COZAAR) 25 MG tablet Take 1 tablet (25 mg) by mouth daily (Patient not  taking: Reported on 11/5/2024) 90 tablet 3    metFORMIN (GLUCOPHAGE XR) 500 MG 24 hr tablet Take 4 tablets (2,000 mg) by mouth daily (with dinner) (Patient not taking: Reported on 11/5/2024) 360 tablet 3    rosuvastatin (CRESTOR) 10 MG tablet Take 1 tablet (10 mg) by mouth daily (Patient not taking: Reported on 11/5/2024) 90 tablet 3    semaglutide (OZEMPIC, 0.25 OR 0.5 MG/DOSE,) 2 MG/1.5ML SOPN pen Inject 0.25mg once weekly for 4 weeks then increase to 0.5mg once weekly (Patient not taking: Reported on 11/5/2024) 4.5 mL 3    venlafaxine (EFFEXOR XR) 150 MG 24 hr capsule Take 1 capsule (150 mg) by mouth daily (Patient not taking: Reported on 11/5/2024) 30 capsule 5     No current facility-administered medications for this visit.     Objective    /82 (BP Location: Left arm, Patient Position: Sitting, Cuff Size: Adult Large)   Pulse 93   Temp 97.6  F (36.4  C) (Temporal)   Resp 15   Wt 104.8 kg (231 lb)   SpO2 99%   BMI 37.00 kg/m    Body mass index is 37 kg/m .  Physical Exam   General: Alert, oriented, no acute distress.    Eyes: Normal external eye, conjunctiva, and lids.   Respiratory: Lungs clear, unlabored. No rales, rhonchi, or wheezes.    Cardiovascular: Regular rate and rhythm, S1 and S2. No murmurs. No peripheral edema.     Gastrointestinal: Normoactive bowel sounds. Soft, nontender, no organomegaly.     Foot Exam: Sensory exam of feet is normal with monofilament. No lesions or ulcers. Normal peripheral pulses.   Neurologic: No gross motor or sensory deficit. Symmetric DTRs. Mentation intact and speech normal.     Psychiatric: Appropriate affect.     Signed Electronically by: KELL Dash CNP

## 2024-11-06 LAB
ALBUMIN SERPL BCG-MCNC: 4.3 G/DL (ref 3.5–5.2)
ALP SERPL-CCNC: 93 U/L (ref 40–150)
ALT SERPL W P-5'-P-CCNC: 20 U/L (ref 0–50)
ANION GAP SERPL CALCULATED.3IONS-SCNC: 12 MMOL/L (ref 7–15)
AST SERPL W P-5'-P-CCNC: 15 U/L (ref 0–45)
BILIRUB DIRECT SERPL-MCNC: <0.2 MG/DL (ref 0–0.3)
BILIRUB SERPL-MCNC: 0.6 MG/DL
BUN SERPL-MCNC: 12.5 MG/DL (ref 6–20)
CALCIUM SERPL-MCNC: 9.6 MG/DL (ref 8.8–10.4)
CHLORIDE SERPL-SCNC: 98 MMOL/L (ref 98–107)
CHOLEST SERPL-MCNC: 241 MG/DL
CREAT SERPL-MCNC: 0.47 MG/DL (ref 0.51–0.95)
CREAT UR-MCNC: 114 MG/DL
EGFRCR SERPLBLD CKD-EPI 2021: >90 ML/MIN/1.73M2
FASTING STATUS PATIENT QL REPORTED: ABNORMAL
FASTING STATUS PATIENT QL REPORTED: ABNORMAL
GLUCOSE SERPL-MCNC: 300 MG/DL (ref 70–99)
HCO3 SERPL-SCNC: 24 MMOL/L (ref 22–29)
HDLC SERPL-MCNC: 44 MG/DL
LDLC SERPL CALC-MCNC: 137 MG/DL
MICROALBUMIN UR-MCNC: 42.8 MG/L
MICROALBUMIN/CREAT UR: 37.54 MG/G CR (ref 0–25)
NONHDLC SERPL-MCNC: 197 MG/DL
POTASSIUM SERPL-SCNC: 3.9 MMOL/L (ref 3.4–5.3)
PROT SERPL-MCNC: 7.9 G/DL (ref 6.4–8.3)
SODIUM SERPL-SCNC: 134 MMOL/L (ref 135–145)
TRIGL SERPL-MCNC: 300 MG/DL

## 2024-11-06 RX ORDER — LISINOPRIL 5 MG/1
5 TABLET ORAL DAILY
Qty: 90 TABLET | Refills: 1 | Status: SHIPPED | OUTPATIENT
Start: 2024-11-06

## 2024-11-13 ENCOUNTER — VIRTUAL VISIT (OUTPATIENT)
Dept: EDUCATION SERVICES | Facility: CLINIC | Age: 39
End: 2024-11-13
Payer: COMMERCIAL

## 2024-11-13 DIAGNOSIS — E11.65 TYPE 2 DIABETES MELLITUS WITH HYPERGLYCEMIA, WITHOUT LONG-TERM CURRENT USE OF INSULIN (H): ICD-10-CM

## 2024-11-13 PROCEDURE — G0108 DIAB MANAGE TRN  PER INDIV: HCPCS | Mod: 95 | Performed by: REGISTERED NURSE

## 2024-11-13 RX ORDER — ACYCLOVIR 800 MG/1
TABLET ORAL
Qty: 6 EACH | Refills: 1 | Status: SHIPPED | OUTPATIENT
Start: 2024-11-13

## 2024-11-13 NOTE — PATIENT INSTRUCTIONS
"Compare cost of the Freestyle Carmen 3 and the Dexcom Stelo. It seems the FreeStyle Carmen 3 will be a lower cost.  Continue Ozempic 0.25 mg every 7 days through Wednesday, 11/7/24.  Increase Ozempic to 0.5 mg every 7 days starting Wednesday, 12/4/24.  Continue Glucophage Extended Release 2000 mg per day.  Eat smaller amounts more often. Avoid skipping meals. Try a lactose-free protein drink for breakfast.  Include protein at meals and snacks.   Avoid sugary drinks (regular soda, lemonade, sweetened tea and Gatorade).  Eat fresh fruit instead of juice.  Include high fiber, whole grain foods instead of processed white foods. Healthier choices are whole grain cereals, whole wheat pasta, quinoa, millet, barley, brown or wild rice and 100% whole wheat bread.   Aim for foods with 3 grams of fiber or more per serving.  Limit added sugar to 24 grams or less per day.  Stay active every day; try not to sit for more than 30 minutes and walk 20-30 minutes most days of the week.     Follow-up video visit on Wednesday, 12/18/24 to discuss Ozempic dose and review Carmen 3 blood sugar data.    Blood sugar targets:   Before meals:   2 hours after meals: less 180  Bedtime:     A1c target of less than 7.0% (estimated average blood sugar of 154 on your meter)  Time in Range: at least 70%    Freestyle Carmen 3    SENSOR BASICS:  Understand that your glucose sensor measures your glucose in your interstitial fluid and your blood sugar measures your glucose in your blood stream. The readings are not meant to be the same.        Your sensor glucose will lag behind your blood glucose.  \"Remember the roller coaster\".  Your blood sugar is always the front car and your sensor glucose is always the last car.  When blood sugar is moving up or down, the more difference there will be.          Take it easy while wearing the sensor.  Take extra care while bathing and getting dressed.  Wear loose fitting clothes on your sensor and try to " "avoid laying on your sensor.  You can shower/bathe with the sensor on.  But avoid submerging the sensor more than 3 feet for more than 30 minutes.  Gently pat to dry.    INITIAL SET UP:  Download the Carmen 3 faraz if using your smartphone and create an account.  The faraz will walk you through set up.  If you are using the [employer] 3 , turn it on and follow instructions for set up.      Carmen 3 faraz:       There will be a 60 minute warm up for each new sensor.  Each sensor should last 14 days.  Do not take more than 500mg of vitamin C (Carmen 3+ more than 1000mg) per day or this can affect sensor accuracy.    The first 12 hours of every new sensor often a little \"off\" as it gets to know your body fluids.  Set glucose alarms for highs and lows per your preference or at the recommendations of your diabetes educator.  You will not be able to silence or turn off the urgent low alert at 54 mg/dL.  If an alarm goes off, go to your faraz and acknowledge it or you will continue to get alarmed every 5 minutes.  Your Carmen 3 faraz or  will notify you when it is time to change your sensor.  Just remove it like a band aid and throw it in the trash, then place a new sensor.  It is recommended to switch arms with every sensor.    DAILY ROUTINE:  Keep your phone or  within 20 feet of you to keep data communicating with your device.  If you are away from your device for more than 20 minutes, your device will alarm.  Be curious with what the sensor data shows.  How do your food choices impact your glucose?  Exercise?  Complete a fingerstick glucose check at these times:                          -when you feel differently than the sensor indicates                          -when you are not wearing a sensor                          -when you see this symbol:     DATA SHARING:  If you want to share your sensor data with a loved one (for phone users only), send them an invitation through the Carmen 3 faraz.  They will use the " "[employer] faraz and accept your invitation.      [employer] faraz:   Our clinic will link to your data as well (phone users only). Under the menu (3 lines in the upper left corner), go to connected apps, then touch \"connect\" next to [employer], and then \"connect to a practice\".  Enter our practice ID \"[id number]\" and hit connect.  Patients using the reader can upload from home via desktop or laptop computer on [employer] or will have the  downloaded in clinic.     OTHER IMPORTANT NOTES:  If the sensor is often falling off before the 14 days are up, there are products than can help.  Talk to your diabetes educator.  You will need to remove your sensor for some radiology scans: X-rays, MRIs and CT Scans.  Contact the  if the sensor does not last the full 14 days for any reason, or if you have any other technical problems. Keep your Carmen sensor box with the lot and serial numbers as they often ask for this information.  -Carmen customer service phone number: 1-608.774.4957.      -Website for carmen replacement due to sensor failure or falling off: https://www.[employer]/us-en/support/sensor-support-form-questions.html  If your copay is more than $75 a month, call the copay assistance line at 1-479.860.4584 and they will work with your pharmacy to get your cost down.       Dexcom Stelo    Dexcom Stelo is an over-the-counter continuous glucose monitor you can purchase without a prescription. You must make sure you have a compatible phone so that you can use the device.  Check for your phone's compatibility with the Dexcom Stelo at this website:   https://[email or web address]    You can order the Dexcom Stelo online at: [email or web address]://[email or web address]-[email or web address]/buy-stelo-one-time  This purchase is HSA / FSA eligible.  It will be available in pharmacies soon.      "

## 2024-11-13 NOTE — LETTER
11/13/2024         RE: Jojo Mccallum  1965 [place] [facility] [address]        Dear Colleague,    Thank you for referring your patient, Jojo Mccallum, to the [facility]. Please see a copy of my visit note below.    Diabetes Self-Management Education & Support    Presents for: Individual review    Type of service:  Video Visit    If the video visit is dropped, the video visit invitation should be resent by: Send to e-mail at: [email or web address]    Originating Location (pt. Location): Home  Distant Location (provider location): Offsite  Mode of Communication:  Video Conference via SumUp    Video Start Time:  8:27 AM  Video End Time (time video stopped): 9:30 AM    How would patient like to obtain AVS? MyChart      ASSESSMENT:  Initial visit for diabetes education counseling.  Jojo did not  her glucometer since she would like a continuous glucose monitoring device.  Her insurance covers the FreeStyle Carmen 3 CGM at a reasonable cost.  Briefly discussed second option of the Dexcom Stelo over-the-counter CGM.  Demonstrated how to apply a sensor using her [facility] faraz.  Jojo restarted Metformin Extended Release and Ozempic at 0.25 mg every 7 days.  She is tolerating the medications with minimal side effects.  Reviewed Ozempic dosing and titration.    Patient's most recent   Lab Results   Component Value Date    A1C 12.0 11/05/2024    A1C 10.2 06/03/2021     is not meeting goal of <7.0    Diabetes knowledge and skills assessment:   Patient is knowledgeable in diabetes management concepts related to: Healthy Eating, Being Active, Taking Medication, and Healthy Coping    Continue education with the following diabetes management concepts: Monitoring, Problem Solving, Reducing Risks, and blood sugar interpretation and medication management    Based on learning assessment above, most appropriate setting for further diabetes education would be: Individual setting.       PLAN  Compare cost of the Freestyle Carmen 3 and the Dexcom Stelo. It seems the FreeStyle Carmen 3 will be a lower cost.  Continue Ozempic 0.25 mg every 7 days through Wednesday, 11/7/24.  Increase Ozempic to 0.5 mg every 7 days starting Wednesday, 12/4/24.  Continue Glucophage Extended Release 2000 mg per day.  Eat smaller amounts more often. Avoid skipping meals. Try a lactose-free protein drink for breakfast.  Include protein at meals and snacks.   Avoid sugary drinks (regular soda, lemonade, sweetened tea and Gatorade).  Eat fresh fruit instead of juice.  Include high fiber, whole grain foods instead of processed white foods. Healthier choices are whole grain cereals, whole wheat pasta, quinoa, millet, barley, brown or wild rice and sourdough bread or 100% whole wheat bread.   Aim for foods with 3 grams of fiber or more per serving.  Limit added sugar to 24 grams or less per day.  Stay active every day; try not to sit for more than 30 minutes and walk 20-30 minutes most days of the week.     Follow-up video visit on Wednesday, 12/18/24 to discuss Ozempic dose and review Carmen 3 blood sugar data.    See Care Plan for co-developed, patient-state behavior change goals.  AVS provided for patient today.    Education Materials Provided via email with permission:  [facility] Understanding Diabetes Booklet  [place] food placement  [place] healthy snack ideas  Carmen 3 booklet  Disposal of sharps  CGM time in range    SUBJECTIVE/OBJECTIVE:  Presents for: Individual review  Accompanied by: Self  Diabetes education in the past 24mo: No  Focus of Visit: Taking Medication, Assistance w/ making life changes  Diabetes type: Type 2  Date of diagnosis: 2017  Disease course: Improving  How confident are you filling out medical forms by yourself:: Quite a bit  Diabetes management related comments/concerns: Would like a Freestyle Carmen 3 or Dexcom Stelo  Transportation concerns: No  Difficulty affording diabetes  "medication?: No  Other concerns:: Contacts  Cultural Influences/Ethnic Background:   or     Diabetes Symptoms & Complications:  Diabetes Related Symptoms: Fatigue, Polyphagia (increased hunger), Yeast infection  Weight trend: Decreasing  Symptom course: Improving  Disease course: Improving  Complications assessed today?: Yes  Autonomic neuropathy: No  CVA: No  Heart disease: No  Nephropathy: No  Peripheral neuropathy: No  Peripheral Vascular Disease: No  Retinopathy: No    Patient Problem List and Family Medical History reviewed for relevant medical history, current medical status, and diabetes risk factors.    Vitals:  There were no vitals taken for this visit.  Estimated body mass index is 37 kg/m  as calculated from the following:    Height as of 9/20/22: 1.683 m (5' 6.25\").    Weight as of 11/5/24: 104.8 kg (231 lb).   Last 3 BP:   BP Readings from Last 3 Encounters:   11/05/24 134/82   09/20/22 124/74   06/16/22 120/80       History   Smoking Status     Former     Packs/day: 0.10     Types: Cigarettes   Smokeless Tobacco     Never       Labs:  Lab Results   Component Value Date    A1C 12.0 11/05/2024    A1C 10.2 06/03/2021     Lab Results   Component Value Date     11/05/2024     06/16/2022     06/03/2021     Lab Results   Component Value Date     11/05/2024     06/16/2022     04/08/2021     HDL Cholesterol   Date Value Ref Range Status   04/08/2021 43 (L) >49 mg/dL Final     Direct Measure HDL   Date Value Ref Range Status   11/05/2024 44 (L) >=50 mg/dL Final   ]  GFR Estimate   Date Value Ref Range Status   11/05/2024 >90 >60 mL/min/1.73m2 Final     Comment:     eGFR calculated using 2021 CKD-EPI equation.   06/03/2021 >90 >60 mL/min/[1.73_m2] Final     Comment:     Non  GFR Calc  Starting 12/18/2018, serum creatinine based estimated GFR (eGFR) will be   calculated using the Chronic Kidney Disease Epidemiology Collaboration   (CKD-EPI) " "equation.       GFR Estimate If Black   Date Value Ref Range Status   06/03/2021 >90 >60 mL/min/[1.73_m2] Final     Comment:      GFR Calc  Starting 12/18/2018, serum creatinine based estimated GFR (eGFR) will be   calculated using the Chronic Kidney Disease Epidemiology Collaboration   (CKD-EPI) equation.       Lab Results   Component Value Date    CR 0.47 11/05/2024    CR 0.63 06/03/2021     No results found for: \"MICROALBUMIN\"    Healthy Eating:  Healthy Eating Assessed Today: Yes  Cultural/Islam diet restrictions?: No  Do you have any food allergies or intolerances?: Yes  Please list your food allergies / intolerances: Lactose tolerance  Meal planning/habits: Smaller portions, Low carb  Who cooks/prepares meals for you?: Self  Who purchases food in  your home?: Self  How many times a week on average do you eat food made away from home (restaurant/take-out)?: 3  Meals include: Breakfast, Lunch, Dinner, Afternoon Snack, Evening Snack  Breakfast: skips half of the time. Depends on how hungry she is. Drinks water. Sometimes cereal or cream of wheat or lewis, eggs, 1-2 slices of white toast.  Lunch: sandwich on white bread (turkey, chicken or bologna, cheese) lactaid or can of hernandez's soup or ramen or chipotle bowl. water with Gordon sugar-free flavoring.  Dinner: Protein (chicken, pork steak), starch (white rice with chicken boullion) and veggies (asparagus, broccoli, brussels sprouts). Lactaid milk or diet soda or water.  Snacks: Loves 1 oz bag spicy chips (hot Cheetos), granola bars, coconut milk jello, veggies or leftovers  Other: Work from home. Job is sedentary. Takes short breaks.  Beverages: Water, Tea, Coffee, Milk, Diet soda  Has patient met with a dietitian in the past?: Yes    Being Active:  Being Active Assessed Today: Yes  Exercise:: Yes (takes dog for walks, yoga. Has a treadmill in front of a TV)  Barrier to exercise: Time    Monitoring:  Monitoring Assessed Today: Yes  Did patient " bring glucose meter to appointment? : No  Blood Glucose Meter: Unknown  Times checking blood sugar at home (number): Never    Taking Medications:  Diabetes Medication(s)       Biguanides       metFORMIN (GLUCOPHAGE XR) 500 MG 24 hr tablet Take 4 tablets (2,000 mg) by mouth daily (with dinner).       Incretin Mimetic Agents       semaglutide (OZEMPIC) 2 MG/3ML pen Inject 0.5 mg subcutaneously every 7 days.     semaglutide (OZEMPIC) 2 MG/3ML pen Inject 0.25 mg subcutaneously every 7 days.            Taking Medication Assessed Today: Yes  Current Treatments: Oral Medication (taken by mouth), Diet, Non-insulin Injectables  Problems taking diabetes medications regularly?: No  Diabetes medication side effects?: No    Problem Solving:  Problem Solving Assessed Today: Yes  Is the patient at risk for hypoglycemia?: No    Reducing Risks:  Reducing Risks Assessed Today: Yes  Diabetes Risks: History of gestational diabetes, Hyperlipidemia, Family History, Ethnicity  Additional female risks: PCOS, Gestational Diabetes  CAD Risks: Diabetes Mellitus, Dyslipidemia, Obesity, Sedentary lifestyle, Hypertension  Has dilated eye exam at least once a year?: Yes  Sees dentist every 6 months?: No  Feet checked by healthcare provider in the last year?: Yes    Healthy Coping:  Healthy Coping Assessed Today: Yes  Emotional response to diabetes: Ready to learn  Informal Support system:: Family, Friends, Spouse  Stage of change: ACTION (Actively working towards change)  Patient Activation Measure Survey Score:      2/25/2020     8:39 AM   AMANDA Score (Last Two)   AMANDA Raw Score 34   Activation Score 68.9   AMANDA Level 3       Care Plan and Education Provided:  Healthy Eating: Consistency in amount and timing of carbohydrate intake, Being Active: Amount recommended (150 minutes moderate or 75 minutes vigorous activity and 2-3 days strength training per week), Monitoring: Blood glucose versus Continuous Glucose Monitoring, Individual glucose targets,  Proper sharps disposal, and CGM instruction: Patient was instructed on Freestyle Carmen System: Freestyle Carmen sensor: insertion technique, sensor site location and rotation, insulin administration in relation to sensor placement, sensor wear, reasons to remove sensor (MRI, CT, diathermy), Vitamin C & Aspirin effects on sensor, Taking Medication: Action of prescribed medication(s), Side effects of prescribed medication(s), and When to take medication(s), and Problem Solving: High glucose - causes, signs/symptoms, treatment and prevention and Low glucose - causes, signs/symptoms, treatment and prevention      Carey Dillon RD, LD, [facility]   Certified Diabetes Care and   [facility] Tuesdays and Thursdays  [facility] Wednesdays-virtual visits only    Time Spent: 60 minutes  Encounter Type: Individual    Any diabetes medication dose changes were made via the CDE Protocol per the patient's primary care provider. A copy of this encounter was shared with the provider.

## 2024-11-14 NOTE — PROGRESS NOTES
Diabetes Self-Management Education & Support    Presents for: Individual review    Type of service:  Video Visit    If the video visit is dropped, the video visit invitation should be resent by: Send to e-mail at: [email or web address]    Originating Location (pt. Location): Home  Distant Location (provider location): Offsite  Mode of Communication:  Video Conference via [employer]Jam    Video Start Time:  8:27 AM  Video End Time (time video stopped): 9:30 AM    How would patient like to obtain AVS? MyChart      ASSESSMENT:  Initial visit for diabetes education counseling.  Jojo did not  her glucometer since she would like a continuous glucose monitoring device.  Her insurance covers the FreeStyle Carmen 3 CGM at a reasonable cost.  Briefly discussed second option of the Dexcom Stelo over-the-counter CGM.  Demonstrated how to apply a sensor using her [email or web address] faraz.  Jojo restarted Metformin Extended Release and Ozempic at 0.25 mg every 7 days.  She is tolerating the medications with minimal side effects.  Reviewed Ozempic dosing and titration.    Patient's most recent   Lab Results   Component Value Date    A1C 12.0 11/05/2024    A1C 10.2 06/03/2021     is not meeting goal of <7.0    Diabetes knowledge and skills assessment:   Patient is knowledgeable in diabetes management concepts related to: Healthy Eating, Being Active, Taking Medication, and Healthy Coping    Continue education with the following diabetes management concepts: Monitoring, Problem Solving, Reducing Risks, and blood sugar interpretation and medication management    Based on learning assessment above, most appropriate setting for further diabetes education would be: Individual setting.      PLAN  Compare cost of the Freestyle Carmen 3 and the Dexcom Stelo. It seems the FreeStyle Carmen 3 will be a lower cost.  Continue Ozempic 0.25 mg every 7 days through Wednesday, 11/7/24.  Increase Ozempic to 0.5 mg every 7 days starting Wednesday,  12/4/24.  Continue Glucophage Extended Release 2000 mg per day.  Eat smaller amounts more often. Avoid skipping meals. Try a lactose-free protein drink for breakfast.  Include protein at meals and snacks.   Avoid sugary drinks (regular soda, lemonade, sweetened tea and Gatorade).  Eat fresh fruit instead of juice.  Include high fiber, whole grain foods instead of processed white foods. Healthier choices are whole grain cereals, whole wheat pasta, quinoa, millet, barley, brown or wild rice and sourdough bread or 100% whole wheat bread.   Aim for foods with 3 grams of fiber or more per serving.  Limit added sugar to 24 grams or less per day.  Stay active every day; try not to sit for more than 30 minutes and walk 20-30 minutes most days of the week.     Follow-up video visit on Wednesday, 12/18/24 to discuss Ozempic dose and review Carmen 3 blood sugar data.    See Care Plan for co-developed, patient-state behavior change goals.  AVS provided for patient today.    Education Materials Provided via email with permission:   [employer] Understanding Diabetes Booklet  [place] food placement  [place] healthy snack ideas  Carmen 3 booklet  Disposal of sharps  CGM time in range    SUBJECTIVE/OBJECTIVE:  Presents for: Individual review  Accompanied by: Self  Diabetes education in the past 24mo: No  Focus of Visit: Taking Medication, Assistance w/ making life changes  Diabetes type: Type 2  Date of diagnosis: 2017  Disease course: Improving  How confident are you filling out medical forms by yourself:: Quite a bit  Diabetes management related comments/concerns: Would like a Freestyle Carmen 3 or Dexcom Stelo  Transportation concerns: No  Difficulty affording diabetes medication?: No  Other concerns:: Contacts  Cultural Influences/Ethnic Background:   or     Diabetes Symptoms & Complications:  Diabetes Related Symptoms: Fatigue, Polyphagia (increased hunger), Yeast infection  Weight trend: Decreasing  Symptom  "course: Improving  Disease course: Improving  Complications assessed today?: Yes  Autonomic neuropathy: No  CVA: No  Heart disease: No  Nephropathy: No  Peripheral neuropathy: No  Peripheral Vascular Disease: No  Retinopathy: No    Patient Problem List and Family Medical History reviewed for relevant medical history, current medical status, and diabetes risk factors.    Vitals:  There were no vitals taken for this visit.  Estimated body mass index is 37 kg/m  as calculated from the following:    Height as of 9/20/22: 1.683 m (5' 6.25\").    Weight as of 11/5/24: 104.8 kg (231 lb).   Last 3 BP:   BP Readings from Last 3 Encounters:   11/05/24 134/82   09/20/22 124/74   06/16/22 120/80       History   Smoking Status    Former    Packs/day: 0.10    Types: Cigarettes   Smokeless Tobacco    Never       Labs:  Lab Results   Component Value Date    A1C 12.0 11/05/2024    A1C 10.2 06/03/2021     Lab Results   Component Value Date     11/05/2024     06/16/2022     06/03/2021     Lab Results   Component Value Date     11/05/2024     06/16/2022     04/08/2021     HDL Cholesterol   Date Value Ref Range Status   04/08/2021 43 (L) >49 mg/dL Final     Direct Measure HDL   Date Value Ref Range Status   11/05/2024 44 (L) >=50 mg/dL Final   ]  GFR Estimate   Date Value Ref Range Status   11/05/2024 >90 >60 mL/min/1.73m2 Final     Comment:     eGFR calculated using 2021 CKD-EPI equation.   06/03/2021 >90 >60 mL/min/[1.73_m2] Final     Comment:     Non  GFR Calc  Starting 12/18/2018, serum creatinine based estimated GFR (eGFR) will be   calculated using the Chronic Kidney Disease Epidemiology Collaboration   (CKD-EPI) equation.       GFR Estimate If Black   Date Value Ref Range Status   06/03/2021 >90 >60 mL/min/[1.73_m2] Final     Comment:      GFR Calc  Starting 12/18/2018, serum creatinine based estimated GFR (eGFR) will be   calculated using the Chronic Kidney " "Disease Epidemiology Collaboration   (CKD-EPI) equation.       Lab Results   Component Value Date    CR 0.47 11/05/2024    CR 0.63 06/03/2021     No results found for: \"MICROALBUMIN\"    Healthy Eating:  Healthy Eating Assessed Today: Yes  Cultural/Yarsani diet restrictions?: No  Do you have any food allergies or intolerances?: Yes  Please list your food allergies / intolerances: Lactose tolerance  Meal planning/habits: Smaller portions, Low carb  Who cooks/prepares meals for you?: Self  Who purchases food in  your home?: Self  How many times a week on average do you eat food made away from home (restaurant/take-out)?: 3  Meals include: Breakfast, Lunch, Dinner, Afternoon Snack, Evening Snack  Breakfast: skips half of the time. Depends on how hungry she is. Drinks water. Sometimes cereal or cream of wheat or lewis, eggs, 1-2 slices of white toast.  Lunch: sandwich on white bread (turkey, chicken or bologna, cheese) lactaid or can of hernandez's soup or ramen or chipotle bowl. water with [employer] sugar-free flavoring.  Dinner: Protein (chicken, pork steak), starch (white rice with chicken boullion) and veggies (asparagus, broccoli, brussels sprouts). Lactaid milk or diet soda or water.  Snacks: Loves 1 oz bag spicy chips (hot Cheetos), granola bars, coconut milk jello, veggies or leftovers  Other: Work from home. Job is sedentary. Takes short breaks.  Beverages: Water, Tea, Coffee, Milk, Diet soda  Has patient met with a dietitian in the past?: Yes    Being Active:  Being Active Assessed Today: Yes  Exercise:: Yes (takes dog for walks, yoga. Has a treadmill in front of a TV)  Barrier to exercise: Time    Monitoring:  Monitoring Assessed Today: Yes  Did patient bring glucose meter to appointment? : No  Blood Glucose Meter: Unknown  Times checking blood sugar at home (number): Never    Taking Medications:  Diabetes Medication(s)       Biguanides       metFORMIN (GLUCOPHAGE XR) 500 MG 24 hr tablet Take 4 tablets (2,000 mg) " by mouth daily (with dinner).       Incretin Mimetic Agents       semaglutide (OZEMPIC) 2 MG/3ML pen Inject 0.5 mg subcutaneously every 7 days.     semaglutide (OZEMPIC) 2 MG/3ML pen Inject 0.25 mg subcutaneously every 7 days.            Taking Medication Assessed Today: Yes  Current Treatments: Oral Medication (taken by mouth), Diet, Non-insulin Injectables  Problems taking diabetes medications regularly?: No  Diabetes medication side effects?: No    Problem Solving:  Problem Solving Assessed Today: Yes  Is the patient at risk for hypoglycemia?: No    Reducing Risks:  Reducing Risks Assessed Today: Yes  Diabetes Risks: History of gestational diabetes, Hyperlipidemia, Family History, Ethnicity  Additional female risks: PCOS, Gestational Diabetes  CAD Risks: Diabetes Mellitus, Dyslipidemia, Obesity, Sedentary lifestyle, Hypertension  Has dilated eye exam at least once a year?: Yes  Sees dentist every 6 months?: No  Feet checked by healthcare provider in the last year?: Yes    Healthy Coping:  Healthy Coping Assessed Today: Yes  Emotional response to diabetes: Ready to learn  Informal Support system:: Family, Friends, Spouse  Stage of change: ACTION (Actively working towards change)  Patient Activation Measure Survey Score:      2/25/2020     8:39 AM   AMANDA Score (Last Two)   AMANDA Raw Score 34   Activation Score 68.9   AMANDA Level 3       Care Plan and Education Provided:  Healthy Eating: Consistency in amount and timing of carbohydrate intake, Being Active: Amount recommended (150 minutes moderate or 75 minutes vigorous activity and 2-3 days strength training per week), Monitoring: Blood glucose versus Continuous Glucose Monitoring, Individual glucose targets, Proper sharps disposal, and CGM instruction: Patient was instructed on Freestyle Carmen System: Freestyle Carmen sensor: insertion technique, sensor site location and rotation, insulin administration in relation to sensor placement, sensor wear, reasons to remove  sensor (MRI, CT, diathermy), Vitamin C & Aspirin effects on sensor, Taking Medication: Action of prescribed medication(s), Side effects of prescribed medication(s), and When to take medication(s), and Problem Solving: High glucose - causes, signs/symptoms, treatment and prevention and Low glucose - causes, signs/symptoms, treatment and prevention      Carey Dillon RD, LD, [facility]   Certified Diabetes Care and   [facility] Tuesdays and Thursdays  [facility] Wednesdays-virtual visits only    Time Spent: 60 minutes  Encounter Type: Individual    Any diabetes medication dose changes were made via the CDE Protocol per the patient's primary care provider. A copy of this encounter was shared with the provider.

## 2024-11-15 ENCOUNTER — TELEPHONE (OUTPATIENT)
Dept: FAMILY MEDICINE | Facility: CLINIC | Age: 39
End: 2024-11-15

## 2024-11-15 NOTE — TELEPHONE ENCOUNTER
PA Initiation    Medication: Ozempic (0.25 or 0.5 mg/dose) 2mg/3ml Pen-injectors  Insurance Company: [employer] Out of State   Pharmacy Filling the Rx:  [facility]  Filling Pharmacy Phone:  [phone]  Filling Pharmacy Fax:  [phone]  Start Date:  11/11/2024

## 2024-11-19 NOTE — TELEPHONE ENCOUNTER
Prior Authorization Approval    Medication: OZEMPIC (0.25 OR 0.5 MG/DOSE) 2 MG/3ML SC SOPN  Authorization Effective Date: 11/19/2024  Authorization Expiration Date: 11/18/2025  Approved Dose/Quantity:       Reference #:     Insurance Company: [employer] - Phone [phone] Fax [phone]  Expected CoPay: $    CoPay Card Available:      Financial Assistance Needed:   Which Pharmacy is filling the prescription: [id number] - [place], [place] - [address] AT [facility] [address]  Pharmacy Notified: YES  Patient Notified: Instructed pharmacy to notify patient once order is ready.

## 2024-12-02 ENCOUNTER — APPOINTMENT (OUTPATIENT)
Dept: ULTRASOUND IMAGING | Facility: HOSPITAL | Age: 39
End: 2024-12-02
Attending: EMERGENCY MEDICINE
Payer: COMMERCIAL

## 2024-12-02 ENCOUNTER — NURSE TRIAGE (OUTPATIENT)
Dept: FAMILY MEDICINE | Facility: CLINIC | Age: 39
End: 2024-12-02
Payer: COMMERCIAL

## 2024-12-02 ENCOUNTER — APPOINTMENT (OUTPATIENT)
Dept: CT IMAGING | Facility: HOSPITAL | Age: 39
End: 2024-12-02
Attending: EMERGENCY MEDICINE
Payer: COMMERCIAL

## 2024-12-02 ENCOUNTER — HOSPITAL ENCOUNTER (EMERGENCY)
Facility: HOSPITAL | Age: 39
Discharge: HOME | End: 2024-12-02
Attending: EMERGENCY MEDICINE | Admitting: EMERGENCY MEDICINE
Payer: COMMERCIAL

## 2024-12-02 VITALS
TEMPERATURE: 96.9 F | SYSTOLIC BLOOD PRESSURE: 160 MMHG | RESPIRATION RATE: 24 BRPM | HEART RATE: 95 BPM | DIASTOLIC BLOOD PRESSURE: 87 MMHG | OXYGEN SATURATION: 96 %

## 2024-12-02 DIAGNOSIS — R73.9 HYPERGLYCEMIA: ICD-10-CM

## 2024-12-02 DIAGNOSIS — K80.20 GALLSTONES: ICD-10-CM

## 2024-12-02 DIAGNOSIS — K80.50 BILIARY COLIC: ICD-10-CM

## 2024-12-02 LAB
ALBUMIN SERPL BCG-MCNC: 4.4 G/DL (ref 3.5–5.2)
ALBUMIN UR-MCNC: NEGATIVE MG/DL
ALP SERPL-CCNC: 84 U/L (ref 40–150)
ALT SERPL W P-5'-P-CCNC: 24 U/L (ref 0–50)
ANION GAP SERPL CALCULATED.3IONS-SCNC: 13 MMOL/L (ref 7–15)
APPEARANCE UR: CLEAR
AST SERPL W P-5'-P-CCNC: 15 U/L (ref 0–45)
BACTERIA #/AREA URNS HPF: ABNORMAL /HPF
BASOPHILS # BLD AUTO: 0 10E3/UL (ref 0–0.2)
BASOPHILS NFR BLD AUTO: 0 %
BILIRUB SERPL-MCNC: 0.7 MG/DL
BILIRUB UR QL STRIP: NEGATIVE
BUN SERPL-MCNC: 11.6 MG/DL (ref 6–20)
CALCIUM SERPL-MCNC: 9.4 MG/DL (ref 8.8–10.4)
CHLORIDE SERPL-SCNC: 100 MMOL/L (ref 98–107)
COLOR UR AUTO: COLORLESS
CREAT SERPL-MCNC: 0.62 MG/DL (ref 0.51–0.95)
EGFRCR SERPLBLD CKD-EPI 2021: >90 ML/MIN/1.73M2
EOSINOPHIL # BLD AUTO: 0 10E3/UL (ref 0–0.7)
EOSINOPHIL NFR BLD AUTO: 0 %
ERYTHROCYTE [DISTWIDTH] IN BLOOD BY AUTOMATED COUNT: 11.7 % (ref 10–15)
GLUCOSE BLDC GLUCOMTR-MCNC: 372 MG/DL (ref 70–99)
GLUCOSE SERPL-MCNC: 391 MG/DL (ref 70–99)
GLUCOSE UR STRIP-MCNC: >1000 MG/DL
HCG SERPL QL: NEGATIVE
HCO3 SERPL-SCNC: 24 MMOL/L (ref 22–29)
HCT VFR BLD AUTO: 41.3 % (ref 35–47)
HGB BLD-MCNC: 13.8 G/DL (ref 11.7–15.7)
HGB UR QL STRIP: ABNORMAL
IMM GRANULOCYTES # BLD: 0.1 10E3/UL
IMM GRANULOCYTES NFR BLD: 1 %
KETONES UR STRIP-MCNC: 20 MG/DL
LEUKOCYTE ESTERASE UR QL STRIP: NEGATIVE
LIPASE SERPL-CCNC: 28 U/L (ref 13–60)
LYMPHOCYTES # BLD AUTO: 1 10E3/UL (ref 0.8–5.3)
LYMPHOCYTES NFR BLD AUTO: 6 %
MCH RBC QN AUTO: 28 PG (ref 26.5–33)
MCHC RBC AUTO-ENTMCNC: 33.4 G/DL (ref 31.5–36.5)
MCV RBC AUTO: 84 FL (ref 78–100)
MONOCYTES # BLD AUTO: 0.7 10E3/UL (ref 0–1.3)
MONOCYTES NFR BLD AUTO: 4 %
MUCOUS THREADS #/AREA URNS LPF: PRESENT /LPF
NEUTROPHILS # BLD AUTO: 13.8 10E3/UL (ref 1.6–8.3)
NEUTROPHILS NFR BLD AUTO: 89 %
NITRATE UR QL: NEGATIVE
NRBC # BLD AUTO: 0 10E3/UL
NRBC BLD AUTO-RTO: 0 /100
PH UR STRIP: 6 [PH] (ref 5–7)
PLATELET # BLD AUTO: 289 10E3/UL (ref 150–450)
POTASSIUM SERPL-SCNC: 3.8 MMOL/L (ref 3.4–5.3)
PROT SERPL-MCNC: 7.9 G/DL (ref 6.4–8.3)
RBC # BLD AUTO: 4.92 10E6/UL (ref 3.8–5.2)
RBC URINE: 2 /HPF
SODIUM SERPL-SCNC: 137 MMOL/L (ref 135–145)
SP GR UR STRIP: 1.01 (ref 1–1.03)
SQUAMOUS EPITHELIAL: 4 /HPF
UROBILINOGEN UR STRIP-MCNC: <2 MG/DL
WBC # BLD AUTO: 15.5 10E3/UL (ref 4–11)
WBC URINE: 3 /HPF

## 2024-12-02 PROCEDURE — 74177 CT ABD & PELVIS W/CONTRAST: CPT

## 2024-12-02 PROCEDURE — 85004 AUTOMATED DIFF WBC COUNT: CPT | Performed by: EMERGENCY MEDICINE

## 2024-12-02 PROCEDURE — 96375 TX/PRO/DX INJ NEW DRUG ADDON: CPT

## 2024-12-02 PROCEDURE — 83690 ASSAY OF LIPASE: CPT | Performed by: EMERGENCY MEDICINE

## 2024-12-02 PROCEDURE — 82310 ASSAY OF CALCIUM: CPT | Performed by: EMERGENCY MEDICINE

## 2024-12-02 PROCEDURE — 82565 ASSAY OF CREATININE: CPT | Performed by: EMERGENCY MEDICINE

## 2024-12-02 PROCEDURE — 96365 THER/PROPH/DIAG IV INF INIT: CPT

## 2024-12-02 PROCEDURE — 99285 EMERGENCY DEPT VISIT HI MDM: CPT | Mod: 25

## 2024-12-02 PROCEDURE — 81003 URINALYSIS AUTO W/O SCOPE: CPT | Performed by: EMERGENCY MEDICINE

## 2024-12-02 PROCEDURE — 250N000011 HC RX IP 250 OP 636: Performed by: EMERGENCY MEDICINE

## 2024-12-02 PROCEDURE — 85048 AUTOMATED LEUKOCYTE COUNT: CPT | Performed by: EMERGENCY MEDICINE

## 2024-12-02 PROCEDURE — 96361 HYDRATE IV INFUSION ADD-ON: CPT

## 2024-12-02 PROCEDURE — 36415 COLL VENOUS BLD VENIPUNCTURE: CPT | Performed by: EMERGENCY MEDICINE

## 2024-12-02 PROCEDURE — 258N000003 HC RX IP 258 OP 636: Performed by: EMERGENCY MEDICINE

## 2024-12-02 PROCEDURE — 96376 TX/PRO/DX INJ SAME DRUG ADON: CPT

## 2024-12-02 PROCEDURE — 84703 CHORIONIC GONADOTROPIN ASSAY: CPT | Performed by: EMERGENCY MEDICINE

## 2024-12-02 PROCEDURE — 76705 ECHO EXAM OF ABDOMEN: CPT

## 2024-12-02 PROCEDURE — 82962 GLUCOSE BLOOD TEST: CPT

## 2024-12-02 PROCEDURE — 84460 ALANINE AMINO (ALT) (SGPT): CPT | Performed by: EMERGENCY MEDICINE

## 2024-12-02 RX ORDER — CEFDINIR 300 MG/1
300 CAPSULE ORAL 2 TIMES DAILY
Qty: 14 CAPSULE | Refills: 0 | Status: SHIPPED | OUTPATIENT
Start: 2024-12-02

## 2024-12-02 RX ORDER — ONDANSETRON 4 MG/1
4 TABLET, ORALLY DISINTEGRATING ORAL EVERY 8 HOURS PRN
Qty: 20 TABLET | Refills: 0 | Status: SHIPPED | OUTPATIENT
Start: 2024-12-02

## 2024-12-02 RX ORDER — IOPAMIDOL 755 MG/ML
90 INJECTION, SOLUTION INTRAVASCULAR ONCE
Status: COMPLETED | OUTPATIENT
Start: 2024-12-02 | End: 2024-12-02

## 2024-12-02 RX ORDER — ONDANSETRON 2 MG/ML
4 INJECTION INTRAMUSCULAR; INTRAVENOUS ONCE
Status: COMPLETED | OUTPATIENT
Start: 2024-12-02 | End: 2024-12-02

## 2024-12-02 RX ORDER — CEFTRIAXONE 1 G/1
1 INJECTION, POWDER, FOR SOLUTION INTRAMUSCULAR; INTRAVENOUS ONCE
Status: COMPLETED | OUTPATIENT
Start: 2024-12-02 | End: 2024-12-02

## 2024-12-02 RX ADMIN — IOPAMIDOL 90 ML: 755 INJECTION, SOLUTION INTRAVENOUS at 12:49

## 2024-12-02 RX ADMIN — SODIUM CHLORIDE 1000 ML: 9 INJECTION, SOLUTION INTRAVENOUS at 11:15

## 2024-12-02 RX ADMIN — ONDANSETRON 4 MG: 2 INJECTION INTRAMUSCULAR; INTRAVENOUS at 11:16

## 2024-12-02 RX ADMIN — ONDANSETRON 4 MG: 2 INJECTION INTRAMUSCULAR; INTRAVENOUS at 15:32

## 2024-12-02 RX ADMIN — CEFTRIAXONE SODIUM 1 G: 1 INJECTION, POWDER, FOR SOLUTION INTRAMUSCULAR; INTRAVENOUS at 15:55

## 2024-12-02 RX ADMIN — SODIUM CHLORIDE 1000 ML: 9 INJECTION, SOLUTION INTRAVENOUS at 15:55

## 2024-12-02 ASSESSMENT — ENCOUNTER SYMPTOMS
NAUSEA: 1
ABDOMINAL PAIN: 1
DIARRHEA: 1
BLOOD IN STOOL: 0
VOMITING: 1
DYSURIA: 0

## 2024-12-02 ASSESSMENT — ACTIVITIES OF DAILY LIVING (ADL)
ADLS_ACUITY_SCORE: 41

## 2024-12-02 ASSESSMENT — COLUMBIA-SUICIDE SEVERITY RATING SCALE - C-SSRS
2. HAVE YOU ACTUALLY HAD ANY THOUGHTS OF KILLING YOURSELF IN THE PAST MONTH?: NO
1. IN THE PAST MONTH, HAVE YOU WISHED YOU WERE DEAD OR WISHED YOU COULD GO TO SLEEP AND NOT WAKE UP?: NO
6. HAVE YOU EVER DONE ANYTHING, STARTED TO DO ANYTHING, OR PREPARED TO DO ANYTHING TO END YOUR LIFE?: NO

## 2024-12-02 NOTE — ED NOTES
EMERGENCY DEPARTMENT SIGN OUT NOTE        ED COURSE AND MEDICAL DECISION MAKING  Patient was signed out to me by Dr Aramis Sidhu at 4:01 PM     In brief, Jojo Mccallum is a 39 year old female who initially presented for evaluation of vomiting.      Since 4:00 AM this morning (12/02/2024), the patient has had about eight episodes of emesis. The vomiting has become more frequent since arriving in the ED. Additionally, she endorses abdominal pain that she rates at a 5/10 as well as a sensation of fullness. The patient did have some diarrhea this morning. She was started on Ozempic about one month ago and has taken the full dose in the past. Of note, she had a decreased appetite, nausea, diffuse abdominal pain and abdominal distention on Thursday (11/28/2024). She was okay throughout the weekend up until today.     At time of sign out, disposition was pending PO challenge.    5:14 PM Patient evaluated, finished her second bolus of fluids and dose of nausea meds, feeling much improved, discussed discharge plan, patient comfortable with plan.  Will be discharged home with written prescription as was the plan at signout.      FINAL IMPRESSION    1. Gallstones    2. Biliary colic    3. Hyperglycemia      DISCHARGE MEDS  New Prescriptions    CEFDINIR (OMNICEF) 300 MG CAPSULE    Take 1 capsule (300 mg) by mouth 2 times daily.    ONDANSETRON (ZOFRAN ODT) 4 MG ODT TAB    Take 1 tablet (4 mg) by mouth every 8 hours as needed for nausea.     John Ruggiero MD  [facility] EMERGENCY DEPARTMENT  [address] [place] [id number]  [phone]         John Ruggiero MD  12/02/24 [id number]

## 2024-12-02 NOTE — DISCHARGE INSTRUCTIONS
Follow-up with general surgery regarding her gallstones.  I do recommend Omnicef twice a day based on the bacteria in your urine.  Return the ER for any worsening symptoms.  You can use Zofran every 8 hours needed for nausea.

## 2024-12-02 NOTE — ED NOTES
Pt is getting PO challenged per MD order. RN gave turkey sandwich, apple juice and orange juice. Pt eating in the room with partner at bedside. Reports not having appetite but is able to keep food down at this time.

## 2024-12-02 NOTE — ED NOTES
Pt getting IV fluids at this time. Partner at bedside with the lights dimmed. Pt ate a little bit of the sandwich. Reports not having appetite and has kept down what she ate.

## 2024-12-02 NOTE — TELEPHONE ENCOUNTER
"Patient reports vomiting off and on since Thursday 11/28. Patient took her 4th injection of ozempic on 11/27. Vomited twice on Thursday. Woke up at four am today and has vomited 6 times. (Patient vomited once while on phone with RN). Vomit is yellow/green in color.  Has generalized abdominal pain and fullness. 2 episodes of diarrhea this morning.   Type 2 diabetic, not on insulin. Current blood sugar is 370.   No blood in the vomit or stool.   Patient feels fatigued.   Patient plans to go to the urgency room or emergency room for further evaluation.     Kelly CHILDS,  RN  [facility]      Reason for Disposition   SEVERE vomiting (e.g., 6 or more times/day)  (Exception: Patient sounds well, is drinking liquids, does not sound dehydrated, and vomiting has lasted less than 24 hours.)    Additional Information   Negative: Shock suspected (e.g., cold/pale/clammy skin, too weak to stand, low BP, rapid pulse)   Negative: Difficult to awaken or acting confused (e.g., disoriented, slurred speech)   Negative: Sounds like a life-threatening emergency to the triager   Negative: Vomiting occurs only while coughing   Negative: Pregnant < 20 Weeks and nausea/vomiting began in early pregnancy (i.e., 4-8 weeks pregnant)   Negative: Chest pain   Negative: Headache is main symptom   Negative: Vomiting red blood or black (coffee ground) material   Negative: Vomiting and hernia is more painful or swollen than usual   Negative: Recent head injury (within 3 days)   Negative: Recent abdominal injury (within 7 days)   Negative: Insulin-dependent diabetes and glucose > 240 mg/dL (13 mmol/L)   Negative: Severe pain in one eye    Answer Assessment - Initial Assessment Questions  1. VOMITING SEVERITY: \"How many times have you vomited in the past 24 hours?\"       Since 4 am, 6 times   2. ONSET: \"When did the vomiting begin?\"       4 am   3. FLUIDS: \"What fluids or food have you vomited up today?\" \"Have you been able to keep " "any fluids down?\"      Yellow/green   4. ABDOMEN PAIN: \"Are your having any abdomen pain?\" If Yes : \"How bad is it and what does it feel like?\" (e.g., crampy, dull, intermittent, constant)       3-4/10   5. DIARRHEA: \"Is there any diarrhea?\" If Yes, ask: \"How many times today?\"       Yes, twice today   6. CONTACTS: \"Is there anyone else in the family with the same symptoms?\"       No   7. CAUSE: \"What do you think is causing your vomiting?\"      Restarting diabetes medicine, 1 month ago   8. HYDRATION STATUS: \"Any signs of dehydration?\" (e.g., dry mouth [not only dry lips], too weak to stand) \"When did you last urinate?\"        9. OTHER SYMPTOMS: \"Do you have any other symptoms?\" (e.g., fever, headache, vertigo, vomiting blood or coffee grounds, recent head injury)      Shaky weak   10. PREGNANCY: \"Is there any chance you are pregnant?\" \"When was your last menstrual period?\"        Has period now, has  birth control    Protocols used: Vomiting-A-OH    "

## 2024-12-02 NOTE — ED TRIAGE NOTES
C/o central abd pain 5/10 and bile emesis x 8 since 0400. Had similar symptoms on Thursday but it resolved. Patient is hyperglycemic in triage and observed emesis appears bile without blood. Patient endorses diarrhea without blood in it this morning as well.

## 2024-12-02 NOTE — ED PROVIDER NOTES
EMERGENCY DEPARTMENT ENCOUNTER      NAME: Jojo Mccallum  AGE: 39 year old female  YOB: 1985  MRN: [id number]  EVALUATION DATE & TIME: 12/2/2024 10:50 AM    PCP: Kodak Laws    ED PROVIDER: Tray Sidhu MD      Chief Complaint   Patient presents with    Abdominal Pain    Nausea & Vomiting    Hyperglycemia         FINAL IMPRESSION:  1. Gallstones    2. Biliary colic    3. Hyperglycemia          ED COURSE & MEDICAL DECISION MAKING:    Pertinent Labs & Imaging studies reviewed. (See chart for details)  39 year old female presents to the Emergency Department for evaluation of vomiting, high blood sugars at home.  Was started on metformin a month ago and started on Ozempic a month ago.  Does have diarrhea as well    On exam no guarding or rigidity.  No CVA tenderness.  No fever    ED Course as of 12/02/24 1544   Mon Dec 02, 2024   1450 Here with diarrhea and vomiting.  Has some vague Jian umbilical and epigastric abdominal pain but negative Busby's.  Does get pain with eating.  No known history of gallstones.  Is on Ozempic but is on the starting dose and has not had any dose increases   1451 Differential includes gastroenteritis, cholecystitis, biliary colic, appendicitis, enteritis, pancreatitis, DKA, pregnancy   1451 Given fluids and Zofran.  Plan for CBC metabolic panel, lipase, hCG, UA, CT imaging of abdomen pelvis   1451 WBC(!): 15.5  UA pending   1451 Glucose(!): 391  Known diabetes   1451 Sodium: 137   1451 Potassium: 3.8   1451 Carbon Dioxide (CO2): 24   1451 Anion Gap: 13   1451 Urea Nitrogen: 11.6   1451 Creatinine: 0.62  DKA unlikely with patient not being acidotic   1452 Tachycardia has improved with IV fluid   1452 CT shows gallstones per my independent read.  Radiology says may be bilateral Antonio.  UA pending   1452 With gallstones will obtain ultrasound to look for cholecystitis   1537 CT shows possible pyelonephritis but no CVA tenderness.  Urine is not highly suggestive of  pyelonephritis.  Will send home with Omnicef however in case she does.  Most likely has biliary colic as well as a viral gastroenteritis.  CT does not show evidence of appendicitis   1537 General Surgery referral created.  High blood sugar but is titrating up her metformin with her primary care doctor.  Will return to ER for worsening symptoms.  DKA highly unlikely with patient's not being acidotic on metabolic panel   1543 Patient signed out to Dr. Licona pending p.o. challenge   Ultrasound not suggestive of cholecystitis.  Exam not suggestive of cholecystitis.    Likely viral process and biliary colic.  However does have bacteria in the urine and therefore will prescribe Omnicef.  Will give Zofran for vomiting.  Patient will return to the ER for worsening symptoms        11:06 AM I met with the patient, obtained history, performed an initial exam, and discussed options and plan for diagnostics and treatment here in the ED.   2:20 PM I am updating the patient.   340 PM    Medical Decision Making  Obtained supplemental history:Supplemental history obtained?: No  Reviewed external records: External records reviewed?: Documented in chart and Outpatient Record: 11/13/2024 [facility] virtual visit  Care impacted by chronic illness:Diabetes, Hyperlipidemia, and Hypertension  Did you consider but not order tests?: Work up considered but not performed and documented in chart, if applicable  Did you interpret images independently?: Independent interpretation of ECG and images noted in documentation, when applicable.  Consultation discussion with other provider:Did you involve another provider (consultant, , pharmacy, etc.)?: No  Discharge. I prescribed additional prescription strength medication(s) as charted. I considered admission, but ultimately discharged patient reassuring imaging.    MIPS: Not Applicable            At the conclusion of the encounter I discussed the results of all of the tests and  the disposition. The questions were answered. The patient or family acknowledged understanding and was agreeable with the care plan.       MEDICATIONS GIVEN IN THE EMERGENCY:  Medications   sodium chloride 0.9% BOLUS 1,000 mL (has no administration in time range)   cefTRIAXone (ROCEPHIN) 1 g vial to attach to  mL bag for ADULTS or NS 50 mL bag for PEDS (has no administration in time range)   sodium chloride 0.9% BOLUS 1,000 mL (0 mLs Intravenous Stopped 12/2/24 1320)   ondansetron (ZOFRAN) injection 4 mg (4 mg Intravenous $Given 12/2/24 1116)   iopamidol (ISOVUE-370) solution 90 mL (90 mLs Intravenous $Given 12/2/24 1249)   ondansetron (ZOFRAN) injection 4 mg (4 mg Intravenous $Given 12/2/24 1532)       NEW PRESCRIPTIONS STARTED AT TODAY'S ER VISIT  New Prescriptions    CEFDINIR (OMNICEF) 300 MG CAPSULE    Take 1 capsule (300 mg) by mouth 2 times daily.    ONDANSETRON (ZOFRAN ODT) 4 MG ODT TAB    Take 1 tablet (4 mg) by mouth every 8 hours as needed for nausea.          =================================================================    TRIAGE ASSESSMENT:  C/o central abd pain 5/10 and bile emesis x 8 since 0400. Had similar symptoms on Thursday but it resolved. Patient is hyperglycemic in triage and observed emesis appears bile without blood. Patient endorses diarrhea without blood in it this morning as well.              HPI    Patient information was obtained from: the patient    Use of : N/A         Jojo Mccallum is a 39 year old female with a pertinent history of type 2 diabetes, nonalcoholic fatty who presents to this ED by walk-in for evaluation of vomiting.     Since 4:00 AM this morning (12/02/2024), the patient has had about eight episodes of emesis. The vomiting has become more frequent since arriving at the emergency department. Additionally, she endorses abdominal pain that she rates at a 5/10 as well as a sensation of fullness. The patient did have some diarrhea this morning. She  was started on Ozempic about one month ago and has taken the full dose in the past. Of note, she had a decreased appetite, nausea, diffuse abdominal pain and abdominal distention on Thursday (2024). She was okay throughout the weekend up until today.    She denies dysuria or hematochezia. No recent known sick contacts.    The patient does not have a history of gallbladder issues. She says that there could be a possibility of pregnancy. She has Nexplanon, but says that this is about to .    Per Chart Review, the patient was seen on 2024 at [facility] in [place] for follow-up on type 2 diabetes. The patient was to have a continuous glucose monitoring device. Her insurance covered FreeStyle Carmen 3 CGM at a reasonable cost. Also discussed Dexcom. She had restarted metformin extended release and Ozempic at 0.25 mg every seven days. She had been tolerating these medications with minimal side effects. Planned to increase Ozempic to 0.5 mg every seven days starting on 2024. Her last A1c from 2024 was 12.0%.      REVIEW OF SYSTEMS   Review of Systems   Gastrointestinal:  Positive for abdominal pain, diarrhea, nausea and vomiting. Negative for blood in stool.   Genitourinary:  Negative for dysuria.        PAST MEDICAL HISTORY:  Past Medical History:   Diagnosis Date    Depressive disorder     Diabetes (H)     Nexplanon in place 2021    Left       PAST SURGICAL HISTORY:  Past Surgical History:   Procedure Laterality Date     SECTION             CURRENT MEDICATIONS:    cefdinir (OMNICEF) 300 MG capsule  ondansetron (ZOFRAN ODT) 4 MG ODT tab  albuterol (PROAIR HFA/PROVENTIL HFA/VENTOLIN HFA) 108 (90 Base) MCG/ACT inhaler  blood glucose (NO BRAND SPECIFIED) test strip  blood glucose monitoring (NO BRAND SPECIFIED) meter device kit  Continuous Glucose Sensor (FREESTYLE CARMEN 3 SENSOR) MISC  cyclopentolate (CYCLOGYL) 1 % ophthalmic solution  etonogestrel (NEXPLANON) 68 MG  IMPL  lisinopril (ZESTRIL) 5 MG tablet  metFORMIN (GLUCOPHAGE XR) 500 MG 24 hr tablet  prednisoLONE acetate (PRED FORTE) 1 % ophthalmic suspension  rosuvastatin (CRESTOR) 10 MG tablet  semaglutide (OZEMPIC) 2 MG/3ML pen  semaglutide (OZEMPIC) 2 MG/3ML pen  thin (NO BRAND SPECIFIED) lancets        ALLERGIES:  Allergies   Allergen Reactions    Losartan GI Disturbance     Lactose intolerant       FAMILY HISTORY:  Family History   Problem Relation Age of Onset    Diabetes Mother     Obesity Mother     Kidney Disease Father     Dialysis Father     Obesity Brother     Diabetes Maternal Grandmother     Cerebrovascular Disease Maternal Grandmother         stroke    Obesity Brother        SOCIAL HISTORY:   Social History     Socioeconomic History    Marital status: Single   Tobacco Use    Smoking status: Former     Current packs/day: 0.10     Types: Cigarettes    Smokeless tobacco: Never   Substance and Sexual Activity    Alcohol use: Not Currently    Drug use: Not Currently    Sexual activity: Yes     Partners: Male     Birth control/protection: Implant     Social Drivers of Health     Interpersonal Safety: Low Risk  (11/5/2024)    Interpersonal Safety     Do you feel physically and emotionally safe where you currently live?: Yes     Within the past 12 months, have you been hit, slapped, kicked or otherwise physically hurt by someone?: No     Within the past 12 months, have you been humiliated or emotionally abused in other ways by your partner or ex-partner?: No       VITALS:  BP (!) 162/112   Pulse 103   Temp 96.9  F (36.1  C) (Temporal)   Resp 24   LMP 11/28/2024 (Exact Date)   SpO2 99%     PHYSICAL EXAM      Vitals: BP (!) 162/112   Pulse 103   Temp 96.9  F (36.1  C) (Temporal)   Resp 24   LMP 11/28/2024 (Exact Date)   SpO2 99%   General: Appears in no acute distress, awake, alert, interactive.  Eyes: Conjunctivae non-injected. Sclera anicteric.  HENT: Atraumatic.  Neck: Supple.  Respiratory/Chest: Respiration  unlabored.  Abdomen: non distended  Musculoskeletal: Normal extremities. No edema or erythema.  Skin: Normal color. No rash or diaphoresis.  Neurologic: Face symmetric, moves all extremities spontaneously. Speech clear.  Psychiatric: Oriented to person, place, and time. Affect appropriate.    LAB:  All pertinent labs reviewed and interpreted.  Results for orders placed or performed during the hospital encounter of 12/02/24   CT Abdomen Pelvis w Contrast    Impression    IMPRESSION:   1.  Subtle heterogeneous enhancement of the renal parenchyma both kidneys. Finding is nonspecific and could be normal physiologic appearance but can be associated with pyelonephritis in the proper clinical and laboratory setting. Recommend correlation   with urinalysis.  2.  Moderate diffuse hepatic steatosis.  3.  Cholelithiasis.   US Abdomen Limited    Impression    IMPRESSION:  1.  Cholelithiasis without evidence of acute cholecystitis.  2.  Echogenic and attenuating hepatic parenchyma consistent with diffuse hepatocellular disease, most commonly steatosis.      Glucose by meter   Result Value Ref Range    GLUCOSE BY METER POCT 372 (H) 70 - 99 mg/dL   Result Value Ref Range    Lipase 28 13 - 60 U/L   Comprehensive metabolic panel   Result Value Ref Range    Sodium 137 135 - 145 mmol/L    Potassium 3.8 3.4 - 5.3 mmol/L    Carbon Dioxide (CO2) 24 22 - 29 mmol/L    Anion Gap 13 7 - 15 mmol/L    Urea Nitrogen 11.6 6.0 - 20.0 mg/dL    Creatinine 0.62 0.51 - 0.95 mg/dL    GFR Estimate >90 >60 mL/min/1.73m2    Calcium 9.4 8.8 - 10.4 mg/dL    Chloride 100 98 - 107 mmol/L    Glucose 391 (H) 70 - 99 mg/dL    Alkaline Phosphatase 84 40 - 150 U/L    AST 15 0 - 45 U/L    ALT 24 0 - 50 U/L    Protein Total 7.9 6.4 - 8.3 g/dL    Albumin 4.4 3.5 - 5.2 g/dL    Bilirubin Total 0.7 <=1.2 mg/dL   HCG QUALitative pregnancy (blood)   Result Value Ref Range    hCG Serum Qualitative Negative Negative   CBC with platelets and differential   Result Value Ref  Range    WBC Count 15.5 (H) 4.0 - 11.0 10e3/uL    RBC Count 4.92 3.80 - 5.20 10e6/uL    Hemoglobin 13.8 11.7 - 15.7 g/dL    Hematocrit 41.3 35.0 - 47.0 %    MCV 84 78 - 100 fL    MCH 28.0 26.5 - 33.0 pg    MCHC 33.4 31.5 - 36.5 g/dL    RDW 11.7 10.0 - 15.0 %    Platelet Count 289 150 - 450 10e3/uL    % Neutrophils 89 %    % Lymphocytes 6 %    % Monocytes 4 %    % Eosinophils 0 %    % Basophils 0 %    % Immature Granulocytes 1 %    NRBCs per 100 WBC 0 <1 /100    Absolute Neutrophils 13.8 (H) 1.6 - 8.3 10e3/uL    Absolute Lymphocytes 1.0 0.8 - 5.3 10e3/uL    Absolute Monocytes 0.7 0.0 - 1.3 10e3/uL    Absolute Eosinophils 0.0 0.0 - 0.7 10e3/uL    Absolute Basophils 0.0 0.0 - 0.2 10e3/uL    Absolute Immature Granulocytes 0.1 <=0.4 10e3/uL    Absolute NRBCs 0.0 10e3/uL   UA with Microscopic reflex to Culture    Specimen: Urine, Midstream   Result Value Ref Range    Color Urine Colorless Colorless, Straw, Light Yellow, Yellow    Appearance Urine Clear Clear    Glucose Urine >1000 (A) Negative mg/dL    Bilirubin Urine Negative Negative    Ketones Urine 20 (A) Negative mg/dL    Specific Gravity Urine 1.010 1.001 - 1.030    Blood Urine >1.0 mg/dL (A) Negative    pH Urine 6.0 5.0 - 7.0    Protein Albumin Urine Negative Negative mg/dL    Urobilinogen Urine <2.0 <2.0 mg/dL    Nitrite Urine Negative Negative    Leukocyte Esterase Urine Negative Negative    Bacteria Urine Few (A) None Seen /HPF    Mucus Urine Present (A) None Seen /LPF    RBC Urine 2 <=2 /HPF    WBC Urine 3 <=5 /HPF    Squamous Epithelials Urine 4 (H) <=1 /HPF       RADIOLOGY:  Reviewed all pertinent imaging. Please see official radiology report.  US Abdomen Limited   Final Result   IMPRESSION:   1.  Cholelithiasis without evidence of acute cholecystitis.   2.  Echogenic and attenuating hepatic parenchyma consistent with diffuse hepatocellular disease, most commonly steatosis.          CT Abdomen Pelvis w Contrast   Final Result   IMPRESSION:    1.  Subtle  heterogeneous enhancement of the renal parenchyma both kidneys. Finding is nonspecific and could be normal physiologic appearance but can be associated with pyelonephritis in the proper clinical and laboratory setting. Recommend correlation    with urinalysis.   2.  Moderate diffuse hepatic steatosis.   3.  Cholelithiasis.                I, Haydee Jimenes, am serving as a scribe to document services personally performed by Tray Sidhu MD based on my observation and the provider's statements to me. I, Tray Sidhu MD, attest that Haydee Jimenes is acting in a scribe capacity, has observed my performance of the services and has documented them in accordance with my direction.    Tray Sidhu MD  [facility] EMERGENCY DEPARTMENT  [facility] [facility] [address] [phone]      Tray Sidhu MD  12/02/24 5824

## 2024-12-03 ENCOUNTER — PATIENT OUTREACH (OUTPATIENT)
Dept: FAMILY MEDICINE | Facility: CLINIC | Age: 39
End: 2024-12-03
Payer: COMMERCIAL

## 2024-12-03 NOTE — TELEPHONE ENCOUNTER
Writer called patient and left message to return call to clinic.     If patient returns call please route to nurse queue to complete [facility] hospital follow up questionnaire, medication reconciliation and assist with scheduling a hospital follow up visit.    Toshia Urena RN  [facility]

## 2024-12-03 NOTE — TELEPHONE ENCOUNTER
Transitions of Care Outreach  Chief Complaint   Patient presents with    Hospital F/U       Most Recent Admission Date: 12/2/2024   Most Recent Admission Diagnosis:      Most Recent Discharge Date: 12/2/2024   Most Recent Discharge Diagnosis: Gallstones - K80.20  Biliary colic - K80.50  Hyperglycemia - R73.9     Transitions of Care Assessment    Discharge Assessment  How are you doing now that you are home?: Feeling much better  How are your symptoms? (Red Flag symptoms escalate to triage hotline per guidelines): Improved  Do you know how to contact your clinic care team if you have future questions or changes to your health status? : Yes  Does the patient have their discharge instructions? : Yes  Does the patient have questions regarding their discharge instructions? : No  Were you started on any new medications or were there changes to any of your previous medications? : Yes  Does the patient have all of their medications?: No (see comment) (will  today)  Do you have questions regarding any of your medications? : No    Follow up Plan     Discharge Follow-Up  Discharge follow up appointment scheduled in alignment with recommended follow up timeframe or Transitions of Risk Category? (Low = within 30 days; Moderate= within 14 days; High= within 7 days): Yes  Discharge Follow Up Appointment Date: 12/05/24  Discharge Follow Up Appointment Scheduled with?: Specialty Care Provider    Future Appointments   Date Time Provider Department Center   12/5/2024 11:00 AM Andrew Buenrostro MD [facility]   12/18/2024  9:30 AM Carey Dillon RD [facility]   2/5/2025 12:40 PM Kodak Laws MD [facility]   3/28/2025  3:30 PM Casandra Hull PA-C [facility]       Outpatient Plan as outlined on AVS reviewed with patient.    For any urgent concerns, please contact our 24 hour nurse triage line: [phone] ([phone])       Leona Mayer RN

## 2024-12-03 NOTE — TELEPHONE ENCOUNTER
REFERRAL INFORMATION:  Referring Provider:  Tray Sidhu MD   Referring Clinic:  [facility]   Reason for Visit/Diagnosis: K80.50 (ICD-10-CM) - Biliary colic        FUTURE VISIT INFORMATION:  Appointment Date: 12/5/24  Appointment Time:      NOTES RECORD STATUS  DETAILS   OFFICE NOTE from Referring Provider Internal ED 12/2/24-[facility]    PERTINENT LABS Internal    IMAGING (CT, MRI, US, XR)  Internal  In process-[facility]-in PACS 12/2/24-US abdomen  12/2/24-Ct abd pel    [facility]:  6/7/20-CT abdomen pelvis  6/7/20, 12-US pelvic transabdominal       Records Requested    Facility  [facility]  Fax: [phone]   Outcome Requested images

## 2024-12-05 ENCOUNTER — PRE VISIT (OUTPATIENT)
Dept: SURGERY | Facility: CLINIC | Age: 39
End: 2024-12-05

## 2024-12-05 ENCOUNTER — OFFICE VISIT (OUTPATIENT)
Dept: SURGERY | Facility: CLINIC | Age: 39
End: 2024-12-05
Attending: EMERGENCY MEDICINE
Payer: COMMERCIAL

## 2024-12-05 VITALS
HEART RATE: 90 BPM | BODY MASS INDEX: 36.59 KG/M2 | OXYGEN SATURATION: 100 % | HEIGHT: 66 IN | SYSTOLIC BLOOD PRESSURE: 139 MMHG | WEIGHT: 227.7 LBS | DIASTOLIC BLOOD PRESSURE: 81 MMHG

## 2024-12-05 DIAGNOSIS — K80.50 BILIARY COLIC: ICD-10-CM

## 2024-12-05 DIAGNOSIS — K80.50 BILIARY COLIC: Primary | ICD-10-CM

## 2024-12-05 PROCEDURE — 99203 OFFICE O/P NEW LOW 30 MIN: CPT | Performed by: SURGERY

## 2024-12-05 RX ORDER — CEFAZOLIN SODIUM 2 G/50ML
2 SOLUTION INTRAVENOUS
OUTPATIENT
Start: 2024-12-05

## 2024-12-05 RX ORDER — CEFAZOLIN SODIUM 2 G/50ML
2 SOLUTION INTRAVENOUS SEE ADMIN INSTRUCTIONS
OUTPATIENT
Start: 2024-12-05

## 2024-12-05 ASSESSMENT — PAIN SCALES - GENERAL: PAINLEVEL_OUTOF10: NO PAIN (0)

## 2024-12-05 NOTE — LETTER
12/5/2024       RE: Jojo Mccallum  1965 [address] [facility] [address]     Dear Colleague,    Thank you for referring your patient, Jojo Mccallum, to the [facility] GENERAL SURGERY CLINIC [place] at [facility]. Please see a copy of my visit note below.        New General Surgery Consultation Note        Jojo Mccallum  [id number]  1985 December 5, 2024     Requesting Provider: Tray Sidhu     Dear Dr Laws,      I had the pleasure of seeing your patient, Jojo Mccallum.  She is a 39 year old female who is being seen in consultation for the following concern(s).     CHIEF COMPLAINT:  Has gallstones on RUQ ultrasound.    Developed vomiting syndrome shortly after Thanksgiving last week.  Has also had abdominal pain; feels like a knife stabbing her upper abdomen.  Right shoulder blade pains.    No prior episodes.    Has also had PCOS type pains in lower abdomen which are different.    No jaundice/dark urine.    Ultrasound in 2019 no stones; current ultrasound shows stones.        RUQ Ultrasound:      Exam Information    Exam Date Exam Time Exam Date Exam Time Accession # Performing Department Results    12/2/24  2:52 PM 12/2/24  2:52 PM [id number] [facility] Ultrasound      PACS Images     Show images for US Abdomen Limited     Study Result    Narrative & Impression   EXAM: US ABDOMEN LIMITED  LOCATION: [facility]  DATE: 12/2/2024     INDICATION: gallstones, upper abd pain  COMPARISON: CT 12-2-24  TECHNIQUE: Limited abdominal ultrasound.     FINDINGS:     GALLBLADDER: Gallstones in an otherwise normal gallbladder. No wall thickening, or pericholecystic fluid. Negative sonographic Busby's sign.     BILE DUCTS: No biliary dilatation. The common duct measures 5 mm.     LIVER: Increased echogenicity from diffuse fatty infiltration. No focal mass.     RIGHT KIDNEY: Minimal fullness of  the renal pelvis, probably extrarenal pelvis.     PANCREAS: The visualized portions are normal.     No ascites.                                                                      IMPRESSION:  1.  Cholelithiasis without evidence of acute cholecystitis.  2.  Echogenic and attenuating hepatic parenchyma consistent with diffuse hepatocellular disease, most commonly steatosis.          ROS    PAST MEDICAL HISTORY:  Past Medical History:   Diagnosis Date     Depressive disorder      Diabetes (H)      Nexplanon in place 2021    Left        PAST SURGICAL HISTORY:  Past Surgical History:   Procedure Laterality Date      SECTION          MEDICATIONS:  Current Outpatient Medications   Medication Sig Dispense Refill     albuterol (PROAIR HFA/PROVENTIL HFA/VENTOLIN HFA) 108 (90 Base) MCG/ACT inhaler Inhale 2 puffs into the lungs every 6 hours as needed for shortness of breath, wheezing or cough 18 g 1     blood glucose (NO BRAND SPECIFIED) test strip Use to test blood sugar twice times daily or as directed. To accompany: Blood Glucose Monitor Brands: per insurance. 100 strip 6     blood glucose monitoring (NO BRAND SPECIFIED) meter device kit Use to test blood sugar twice times daily or as directed. Preferred blood glucose meter OR supplies to accompany: Blood Glucose Monitor Brands: per insurance. 1 kit 0     cefdinir (OMNICEF) 300 MG capsule Take 1 capsule (300 mg) by mouth 2 times daily. 14 capsule 0     Continuous Glucose Sensor (FREESTYLE HOLLIE 3 SENSOR) MISC Change every 14 days. 6 each 1     cyclopentolate (CYCLOGYL) 1 % ophthalmic solution INSTILL 1 DROP IN BOTH EYES TWICE DAILY       etonogestrel (NEXPLANON) 68 MG IMPL 1 each by Subdermal route once       lisinopril (ZESTRIL) 5 MG tablet Take 1 tablet (5 mg) by mouth daily. 90 tablet 1     metFORMIN (GLUCOPHAGE XR) 500 MG 24 hr tablet Take 4 tablets (2,000 mg) by mouth daily (with dinner). 360 tablet 3     ondansetron (ZOFRAN ODT) 4 MG ODT tab Take 1  tablet (4 mg) by mouth every 8 hours as needed for nausea. 20 tablet 0     prednisoLONE acetate (PRED FORTE) 1 % ophthalmic suspension SHAKE LIQUID AND INSTILL 1 DROP IN BOTH EYES EVERY 2 HOURS       rosuvastatin (CRESTOR) 10 MG tablet Take 1 tablet (10 mg) by mouth daily. 90 tablet 3     semaglutide (OZEMPIC) 2 MG/3ML pen Inject 0.5 mg subcutaneously every 7 days. 3 mL 2     thin (NO BRAND SPECIFIED) lancets Use with lanceting device. To accompany: Blood Glucose Monitor Brands: per insurance. 100 each 6     semaglutide (OZEMPIC) 2 MG/3ML pen Inject 0.25 mg subcutaneously every 7 days. (Patient not taking: Reported on 12/5/2024) 3 mL 0     No current facility-administered medications for this visit.        ALLERGIES:  Allergies   Allergen Reactions     Losartan GI Disturbance     Lactose intolerant        SOCIAL HISTORY:  Social History     Socioeconomic History     Marital status:      Spouse name: None     Number of children: None     Years of education: None     Highest education level: None   Tobacco Use     Smoking status: Former     Current packs/day: 0.10     Types: Cigarettes     Smokeless tobacco: Never   Substance and Sexual Activity     Alcohol use: Not Currently     Drug use: Not Currently     Sexual activity: Yes     Partners: Male     Birth control/protection: Implant     Social Drivers of Health     Interpersonal Safety: Low Risk  (11/5/2024)    Interpersonal Safety      Do you feel physically and emotionally safe where you currently live?: Yes      Within the past 12 months, have you been hit, slapped, kicked or otherwise physically hurt by someone?: No      Within the past 12 months, have you been humiliated or emotionally abused in other ways by your partner or ex-partner?: No       FAMILY HISTORY:  Family History   Problem Relation Age of Onset     Diabetes Mother      Obesity Mother      Kidney Disease Father      Dialysis Father      Obesity Brother      Diabetes Maternal Grandmother       "Cerebrovascular Disease Maternal Grandmother         stroke     Obesity Brother         PHYSICAL EXAM:  Vital Signs: /81 (BP Location: Left arm, Patient Position: Sitting, Cuff Size: Adult Regular)   Pulse 90   Ht 1.683 m (5' 6.25\")   Wt 103.3 kg (227 lb 11.2 oz)   LMP 11/28/2024 (Exact Date)   SpO2 100%   BMI 36.47 kg/m    HEENT: NCAT; MMM;   Lungs: Breathing unlabored  Abdomen: soft, nontender, without hepatosplenomegaly or masses     PHYSICAL EXAM AREA OF INTEREST:  abdomen     PERTINENT IMAGING/TESTING:  Reviewed us  LABORATORY TESTING:  Reviewed.    ASSESSMENT:   Jojo Mccallum is a 39 year old female with cholelithiasis associated with RUQ abdominal pain; seen in ED recently     DISCUSSION OF RISKS:  Prior to surgery, I reviewed the risks of gallbladder removal with this patient.    The risk discussion included, but was not limited to, death, myocardial infarction, pneumonia, urinary tract infection, deep venous thrombosis with or without pulmonary embolus, abdominal infection from bowel injury or abscess, bowel obstruction, wound infection, and bleeding.    Specific risks related to cholecystectomy include bile duct injury (3-4/1000), bile leak (10/1000), retained common bile duct stone (10/1000), postcholecystectomy diarrhea (1-2%) and these complications may require additional treatment.    The potential that gallbladder removal will NOT be of benefit was also reviewed.    Furthermore, alternative therapies and the option for no therapy were also reviewed.    Postoperative treatment and expected follow-up were also reviewed.        PLAN:   Lap cherrie; SDS; robotic assist.  Preop with pcp or pac; [address].    Call Stew at [phone] for scheduling.      No orders of the defined types were placed in this encounter.      Sincerely,     MD Bahman Levi Michael, MD   Physician  Emergency Medicine     ED Notes     Signed     Date of Service: 12/2/2024  4:11 PM  Creation Time: " 12/2/2024  4:11 PM       Show:Clear all  [x]Written[x]Templated[x]Copied    Added by:  [x]John Ruggiero MD[x]Nuris Kirk    []Joaquin for details  EMERGENCY DEPARTMENT SIGN OUT NOTE          ED COURSE AND MEDICAL DECISION MAKING  Patient was signed out to me by Dr Aramis Sidhu at 4:01 PM      In brief, Jojo Mccallum is a 39 year old female who initially presented for evaluation of vomiting.      Since 4:00 AM this morning (12/02/2024), the patient has had about eight episodes of emesis. The vomiting has become more frequent since arriving in the ED. Additionally, she endorses abdominal pain that she rates at a 5/10 as well as a sensation of fullness. The patient did have some diarrhea this morning. She was started on Ozempic about one month ago and has taken the full dose in the past. Of note, she had a decreased appetite, nausea, diffuse abdominal pain and abdominal distention on Thursday (11/28/2024). She was okay throughout the weekend up until today.      At time of sign out, disposition was pending PO challenge.     5:14 PM Patient evaluated, finished her second bolus of fluids and dose of nausea meds, feeling much improved, discussed discharge plan, patient comfortable with plan.  Will be discharged home with written prescription as was the plan at signout.       FINAL IMPRESSION    1. Gallstones    2. Biliary colic    3. Hyperglycemia       DISCHARGE MEDS       New Prescriptions     CEFDINIR (OMNICEF) 300 MG CAPSULE    Take 1 capsule (300 mg) by mouth 2 times daily.     ONDANSETRON (ZOFRAN ODT) 4 MG ODT TAB    Take 1 tablet (4 mg) by mouth every 8 hours as needed for nausea.      John Ruggiero MD  [facility] EMERGENCY DEPARTMENT  [facility] [facility] [id number] [phone]           John Ruggiero MD  12/02/24 1715                  ED on 12/2/2024            Revision History            Detailed Report               Again, thank you for allowing me to  participate in the care of your patient.      Sincerely,    Andrew Buenrostro MD

## 2024-12-05 NOTE — NURSING NOTE
"Chief Complaint   Patient presents with    New Patient     Biliary colic       Vitals:    12/05/24 1103   BP: 139/81   BP Location: Left arm   Patient Position: Sitting   Cuff Size: Adult Regular   Pulse: 90   SpO2: 100%   Weight: 103.3 kg (227 lb 11.2 oz)   Height: 1.683 m (5' 6.25\")       Body mass index is 36.47 kg/m .                          Roque Cormier EMT    "

## 2024-12-05 NOTE — PROGRESS NOTES
New General Surgery Consultation Note        Jojo Mccallum  [id number]  1985 December 5, 2024     Requesting Provider: Tray Sidhu     Dear Dr Laws,      I had the pleasure of seeing your patient, Jojo Mccallum.  She is a 39 year old female who is being seen in consultation for the following concern(s).     CHIEF COMPLAINT:  Has gallstones on RUQ ultrasound.    Developed vomiting syndrome shortly after Thanksgiving last week.  Has also had abdominal pain; feels like a knife stabbing her upper abdomen.  Right shoulder blade pains.    No prior episodes.    Has also had PCOS type pains in lower abdomen which are different.    No jaundice/dark urine.    Ultrasound in 2019 no stones; current ultrasound shows stones.        RUQ Ultrasound:      Exam Information    Exam Date Exam Time Exam Date Exam Time Accession # Performing Department Results    12/2/24  2:52 PM 12/2/24  2:52 PM [id number] [facility] Ultrasound      PACS Images     Show images for US Abdomen Limited     Study Result    Narrative & Impression   EXAM: US ABDOMEN LIMITED  LOCATION: [facility]  DATE: 12/2/2024     INDICATION: gallstones, upper abd pain  COMPARISON: CT 12-2-24  TECHNIQUE: Limited abdominal ultrasound.     FINDINGS:     GALLBLADDER: Gallstones in an otherwise normal gallbladder. No wall thickening, or pericholecystic fluid. Negative sonographic Busby's sign.     BILE DUCTS: No biliary dilatation. The common duct measures 5 mm.     LIVER: Increased echogenicity from diffuse fatty infiltration. No focal mass.     RIGHT KIDNEY: Minimal fullness of the renal pelvis, probably extrarenal pelvis.     PANCREAS: The visualized portions are normal.     No ascites.                                                                      IMPRESSION:  1.  Cholelithiasis without evidence of acute cholecystitis.  2.  Echogenic and attenuating hepatic parenchyma consistent with  diffuse hepatocellular disease, most commonly steatosis.          ROS    PAST MEDICAL HISTORY:  Past Medical History:   Diagnosis Date     Depressive disorder      Diabetes (H)      Nexplanon in place 2021    Left        PAST SURGICAL HISTORY:  Past Surgical History:   Procedure Laterality Date      SECTION          MEDICATIONS:  Current Outpatient Medications   Medication Sig Dispense Refill     albuterol (PROAIR HFA/PROVENTIL HFA/VENTOLIN HFA) 108 (90 Base) MCG/ACT inhaler Inhale 2 puffs into the lungs every 6 hours as needed for shortness of breath, wheezing or cough 18 g 1     blood glucose (NO BRAND SPECIFIED) test strip Use to test blood sugar twice times daily or as directed. To accompany: Blood Glucose Monitor Brands: per insurance. 100 strip 6     blood glucose monitoring (NO BRAND SPECIFIED) meter device kit Use to test blood sugar twice times daily or as directed. Preferred blood glucose meter OR supplies to accompany: Blood Glucose Monitor Brands: per insurance. 1 kit 0     cefdinir (OMNICEF) 300 MG capsule Take 1 capsule (300 mg) by mouth 2 times daily. 14 capsule 0     Continuous Glucose Sensor (FREESTYLE HOLLIE 3 SENSOR) MISC Change every 14 days. 6 each 1     cyclopentolate (CYCLOGYL) 1 % ophthalmic solution INSTILL 1 DROP IN BOTH EYES TWICE DAILY       etonogestrel (NEXPLANON) 68 MG IMPL 1 each by Subdermal route once       lisinopril (ZESTRIL) 5 MG tablet Take 1 tablet (5 mg) by mouth daily. 90 tablet 1     metFORMIN (GLUCOPHAGE XR) 500 MG 24 hr tablet Take 4 tablets (2,000 mg) by mouth daily (with dinner). 360 tablet 3     ondansetron (ZOFRAN ODT) 4 MG ODT tab Take 1 tablet (4 mg) by mouth every 8 hours as needed for nausea. 20 tablet 0     prednisoLONE acetate (PRED FORTE) 1 % ophthalmic suspension SHAKE LIQUID AND INSTILL 1 DROP IN BOTH EYES EVERY 2 HOURS       rosuvastatin (CRESTOR) 10 MG tablet Take 1 tablet (10 mg) by mouth daily. 90 tablet 3     semaglutide (OZEMPIC) 2  "MG/3ML pen Inject 0.5 mg subcutaneously every 7 days. 3 mL 2     thin (NO BRAND SPECIFIED) lancets Use with lanceting device. To accompany: Blood Glucose Monitor Brands: per insurance. 100 each 6     semaglutide (OZEMPIC) 2 MG/3ML pen Inject 0.25 mg subcutaneously every 7 days. (Patient not taking: Reported on 12/5/2024) 3 mL 0     No current facility-administered medications for this visit.        ALLERGIES:  Allergies   Allergen Reactions     Losartan GI Disturbance     Lactose intolerant        SOCIAL HISTORY:  Social History     Socioeconomic History     Marital status:      Spouse name: None     Number of children: None     Years of education: None     Highest education level: None   Tobacco Use     Smoking status: Former     Current packs/day: 0.10     Types: Cigarettes     Smokeless tobacco: Never   Substance and Sexual Activity     Alcohol use: Not Currently     Drug use: Not Currently     Sexual activity: Yes     Partners: Male     Birth control/protection: Implant     Social Drivers of Health     Interpersonal Safety: Low Risk  (11/5/2024)    Interpersonal Safety      Do you feel physically and emotionally safe where you currently live?: Yes      Within the past 12 months, have you been hit, slapped, kicked or otherwise physically hurt by someone?: No      Within the past 12 months, have you been humiliated or emotionally abused in other ways by your partner or ex-partner?: No       FAMILY HISTORY:  Family History   Problem Relation Age of Onset     Diabetes Mother      Obesity Mother      Kidney Disease Father      Dialysis Father      Obesity Brother      Diabetes Maternal Grandmother      Cerebrovascular Disease Maternal Grandmother         stroke     Obesity Brother         PHYSICAL EXAM:  Vital Signs: /81 (BP Location: Left arm, Patient Position: Sitting, Cuff Size: Adult Regular)   Pulse 90   Ht 1.683 m (5' 6.25\")   Wt 103.3 kg (227 lb 11.2 oz)   LMP 11/28/2024 (Exact Date)   SpO2 " 100%   BMI 36.47 kg/m    HEENT: NCAT; MMM;   Lungs: Breathing unlabored  Abdomen: {abdomen:088115}     PHYSICAL EXAM AREA OF INTEREST:  ***     PERTINENT IMAGING/TESTING:  ***  LABORATORY TESTING:  ***  @CBC@  [unfilled]  @INR@  [unfilled]  [unfilled]    ASSESSMENT:   Jojo Mccallum is a 39 year old female with ***     DISCUSSION OF RISKS:  ***  PLAN:   ***  No orders of the defined types were placed in this encounter.      Sincerely,     MD Bahman Levi Michael, MD   Physician  Emergency Medicine     ED Notes     Signed     Date of Service: 12/2/2024  4:11 PM  Creation Time: 12/2/2024  4:11 PM       Show:Clear all  [x]Written[x]Templated[x]Copied    Added by:  [x]John Ruggiero MD[x]Nuris Kirk    []Joaquin for details  EMERGENCY DEPARTMENT SIGN OUT NOTE          ED COURSE AND MEDICAL DECISION MAKING  Patient was signed out to me by Dr Aramis Sidhu at 4:01 PM      In brief, Jojo Mccallum is a 39 year old female who initially presented for evaluation of vomiting.      Since 4:00 AM this morning (12/02/2024), the patient has had about eight episodes of emesis. The vomiting has become more frequent since arriving in the ED. Additionally, she endorses abdominal pain that she rates at a 5/10 as well as a sensation of fullness. The patient did have some diarrhea this morning. She was started on Ozempic about one month ago and has taken the full dose in the past. Of note, she had a decreased appetite, nausea, diffuse abdominal pain and abdominal distention on Thursday (11/28/2024). She was okay throughout the weekend up until today.      At time of sign out, disposition was pending PO challenge.     5:14 PM Patient evaluated, finished her second bolus of fluids and dose of nausea meds, feeling much improved, discussed discharge plan, patient comfortable with plan.  Will be discharged home with written prescription as was the plan at signout.       FINAL IMPRESSION    1. Gallstones    2. Biliary  colic    3. Hyperglycemia       DISCHARGE MEDS       New Prescriptions     CEFDINIR (OMNICEF) 300 MG CAPSULE    Take 1 capsule (300 mg) by mouth 2 times daily.     ONDANSETRON (ZOFRAN ODT) 4 MG ODT TAB    Take 1 tablet (4 mg) by mouth every 8 hours as needed for nausea.      John Ruggiero MD  [facility] EMERGENCY DEPARTMENT  [facility] [facility] [id number]  [phone]           John Ruggiero MD  12/02/24 [facility]                  ED on 12/2/2024            Revision History            Detailed Report

## 2024-12-12 PROBLEM — K80.50 BILIARY COLIC: Status: ACTIVE | Noted: 2024-12-05

## 2024-12-18 ENCOUNTER — VIRTUAL VISIT (OUTPATIENT)
Dept: EDUCATION SERVICES | Facility: CLINIC | Age: 39
End: 2024-12-18
Payer: COMMERCIAL

## 2024-12-18 DIAGNOSIS — E11.65 TYPE 2 DIABETES MELLITUS WITH HYPERGLYCEMIA, WITHOUT LONG-TERM CURRENT USE OF INSULIN (H): Primary | ICD-10-CM

## 2024-12-18 PROCEDURE — G0108 DIAB MANAGE TRN  PER INDIV: HCPCS | Mod: 95 | Performed by: REGISTERED NURSE

## 2024-12-18 RX ORDER — PEN NEEDLE, DIABETIC 30 GX3/16"
1 NEEDLE, DISPOSABLE MISCELLANEOUS DAILY
Qty: 100 EACH | Refills: 4 | Status: SHIPPED | OUTPATIENT
Start: 2024-12-18

## 2024-12-18 RX ORDER — INSULIN GLARGINE 100 [IU]/ML
INJECTION, SOLUTION SUBCUTANEOUS
Qty: 15 ML | Refills: 4 | Status: SHIPPED | OUTPATIENT
Start: 2024-12-18

## 2024-12-18 NOTE — PROGRESS NOTES
Diabetes Self-Management Education & Support  Type of service:  Video Visit    If the video visit is dropped, the video visit invitation should be resent by: Send to e-mail at: [email or web address]    Originating Location (pt. Location): Home  Distant Location (provider location): Offsite  Mode of Communication:  Video Conference via [employer]    Video Start Time: 9:28 AM  Video End Time (time video stopped): 10:16 AM    How would patient like to obtain AVS? MyChart      ASSESSMENT:  1 month follow-up for diabetes education and counseling.  Jojo is using the Freestyle Carmen 3 CGM to monitor blood sugars.  Her blood sugars have decreased but continue above goal.   She is currently taking Ozempic 0.5 milligrams every 7 days.  Her main side effect is significant constipation.  Patient is using stool softeners, drinking a lot of water and increasing fiber.  Yesterday she had to leave work early due to stomach discomfort from constipation.  We discussed not increasing Ozempic at this time.  Jojo will be having a cholecystectomy the end of January.  To decrease blood sugars will start once daily Lantus insulin.  Patient was on insulin in the past.  Reviewed symptoms and treatment of hypoglycemia.    PLAN:  Start Lantus Solostar 10 units at bedtime.  Continue to increase Lantus by 2 units every 3 days until your morning blood sugar is 100-130 consistently.  Continue Ozempic 0.5 mg every 7 days  Continue metformin extended release 2000 mg once daily at the evening meal.  When your blood sugars above 200 continue to drink a lot of water and get some movement.  Buy glucose tablets to keep in your car and on your person to treat low blood sugars (less than 70).  Follow-up in 3 weeks to review FreeStyle Carmen 3 data.  The plan will be to discuss increasing Ozempic after surgery.    REPORTS:                      Patient's most recent   Lab Results   Component Value Date    A1C 12.0 11/05/2024    A1C 10.2 06/03/2021      is not meeting goal of <7.0    Diabetes knowledge and skills assessment:   Patient is knowledgeable in diabetes management concepts related to: Healthy Eating, Being Active, Monitoring, Taking Medication, and Healthy Coping    Continue education with the following diabetes management concepts: Problem Solving, Reducing Risks, and CGM interpretation and medication management.    Based on learning assessment above, most appropriate setting for further diabetes education would be: Individual setting.      See Care Plan for co-developed, patient-state behavior change goals.  AVS provided for patient today.    Education Materials Provided:  Hypoglycemia Signs and Symptoms  Medication information for Lantus Solostar pen  Diabetes and travel via email, (going to [place] for [place]).    SUBJECTIVE/OBJECTIVE:  Presents for: Follow-up  Accompanied by: Self  Diabetes education in the past 24mo: Yes  Focus of Visit: Taking Medication, CGM  Type of CGM visit: Personal CGM Follow-up  Diabetes type: Type 2  Date of diagnosis: 2017  Disease course: Improving  How confident are you filling out medical forms by yourself:: Quite a bit  Diabetes management related comments/concerns: Blood sugars remain elevated.  Transportation concerns: No  Difficulty affording diabetes medication?: No  Difficulty affording diabetes testing supplies?: No  Other concerns:: Contacts  Cultural Influences/Ethnic Background:   or     Diabetes Symptoms & Complications:  Diabetes Related Symptoms: Fatigue, Polyphagia (increased hunger), Yeast infection  Weight trend: Decreasing  Symptom course: Improving  Disease course: Improving  Complications assessed today?: Yes  Autonomic neuropathy: No  CVA: No  Heart disease: No  Nephropathy: No  Peripheral neuropathy: No  Peripheral Vascular Disease: No  Retinopathy: No    Patient Problem List and Family Medical History reviewed for relevant medical history, current medical status, and diabetes risk  "factors.    Vitals:  LMP 11/28/2024 (Exact Date)   Estimated body mass index is 36.47 kg/m  as calculated from the following:    Height as of 12/5/24: 1.683 m (5' 6.25\").    Weight as of 12/5/24: 103.3 kg (227 lb 11.2 oz).   Last 3 BP:   BP Readings from Last 3 Encounters:   12/05/24 139/81   12/02/24 (!) 160/87   11/05/24 134/82       History   Smoking Status    Former    Types: Cigarettes   Smokeless Tobacco    Never       Labs:  Lab Results   Component Value Date    A1C 12.0 11/05/2024    A1C 10.2 06/03/2021     Lab Results   Component Value Date     12/02/2024     12/02/2024     06/16/2022     06/03/2021     Lab Results   Component Value Date     11/05/2024     06/16/2022     04/08/2021     HDL Cholesterol   Date Value Ref Range Status   04/08/2021 43 (L) >49 mg/dL Final     Direct Measure HDL   Date Value Ref Range Status   11/05/2024 44 (L) >=50 mg/dL Final   ]  GFR Estimate   Date Value Ref Range Status   12/02/2024 >90 >60 mL/min/1.73m2 Final     Comment:     eGFR calculated using 2021 CKD-EPI equation.   06/03/2021 >90 >60 mL/min/[1.73_m2] Final     Comment:     Non  GFR Calc  Starting 12/18/2018, serum creatinine based estimated GFR (eGFR) will be   calculated using the Chronic Kidney Disease Epidemiology Collaboration   (CKD-EPI) equation.       GFR Estimate If Black   Date Value Ref Range Status   06/03/2021 >90 >60 mL/min/[1.73_m2] Final     Comment:      GFR Calc  Starting 12/18/2018, serum creatinine based estimated GFR (eGFR) will be   calculated using the Chronic Kidney Disease Epidemiology Collaboration   (CKD-EPI) equation.       Lab Results   Component Value Date    CR 0.62 12/02/2024    CR 0.63 06/03/2021     No results found for: \"MICROALBUMIN\"    Healthy Eating:  Healthy Eating Assessed Today: Yes  Cultural/Spiritism diet restrictions?: No  Do you have any food allergies or intolerances?: Yes  Please list your " food allergies / intolerances: Lactose tolerance  Meal planning/habits: Smaller portions, Low carb  Who cooks/prepares meals for you?: Self  Who purchases food in  your home?: Self  How many times a week on average do you eat food made away from home (restaurant/take-out)?: 3  Meals include: Breakfast, Lunch, Dinner, Afternoon Snack, Evening Snack  Breakfast: skips half of the time. Depends on how hungry she is. Drinks water. Sometimes cereal or cream of wheat or lewis, eggs, 1-2 slices of white toast.  Lunch: sandwich on white bread (turkey, chicken or bologna, cheese) lactaid or can of hernandez's soup or ramen or chipotle bowl. water with [place] sugar-free flavoring.  Dinner: Protein (chicken, pork steak), starch (white rice with chicken boullion) and veggies (asparagus, broccoli, brussels sprouts). Lactaid milk or diet soda or water.  Snacks: Loves 1 oz bag spicy chips (hot Cheetos), granola bars, coconut milk jello, veggies or leftovers  Other: Work from home. Job is sedentary. Takes short breaks.  Beverages: Water, Tea, Coffee, Milk, Diet soda  Has patient met with a dietitian in the past?: Yes    Being Active:  Being Active Assessed Today: Yes  Exercise:: Yes (takes dog for walks, yoga. Has a treadmill in front of a TV)  Barrier to exercise: Time    Monitoring:  Monitoring Assessed Today: Yes  Blood Glucose Meter: CGM ([employer] Carmen 3 using phone)  Blood glucose trend: Decreasing    Taking Medications:  Diabetes Medication(s)       Biguanides       metFORMIN (GLUCOPHAGE XR) 500 MG 24 hr tablet Take 4 tablets (2,000 mg) by mouth daily (with dinner).       Insulin       insulin glargine (LANTUS SOLOSTAR) 100 UNIT/ML pen Take 10 units daily + use 2 unit prime with each dose for a total of 12 units/day. Dispense total daily dose of 20 units per day.       Incretin Mimetic Agents       semaglutide (OZEMPIC) 2 MG/3ML pen Inject 0.5 mg subcutaneously every 7 days.     semaglutide (OZEMPIC) 2 MG/3ML pen Inject 0.25 mg  subcutaneously every 7 days.     Patient not taking: Reported on 12/5/2024            Taking Medication Assessed Today: Yes  Current Treatments: Oral Medication (taken by mouth), Diet, Non-insulin Injectables  Problems taking diabetes medications regularly?: No  Diabetes medication side effects?: No    Problem Solving:  Problem Solving Assessed Today: Yes  Is the patient at risk for hypoglycemia?: No  Is the patient at risk for DKA?: Yes    Reducing Risks:  Reducing Risks Assessed Today: Yes  Diabetes Risks: History of gestational diabetes, Hyperlipidemia, Family History, Ethnicity  Additional female risks: PCOS, Gestational Diabetes  CAD Risks: Diabetes Mellitus, Dyslipidemia, Obesity, Sedentary lifestyle, Hypertension  Has dilated eye exam at least once a year?: Yes  Sees dentist every 6 months?: No  Feet checked by healthcare provider in the last year?: Yes    Healthy Coping:  Healthy Coping Assessed Today: Yes  Emotional response to diabetes: Ready to learn  Informal Support system:: Family, Friends, Spouse  Stage of change: ACTION (Actively working towards change)  Patient Activation Measure Survey Score:      2/25/2020     8:39 AM   AMANDA Score (Last Two)   AMANDA Raw Score 34   Activation Score 68.9   AMANDA Level 3     Care Plan and Education Provided:  Monitoring: Individual glucose targets and Log and interpret results and Taking Medication: Action of prescribed medication(s), Administering and storing injectable diabetes medications, and Side effects of prescribed medication(s)      Carey Dillon RD, LD, [facility]   Certified Diabetes Care and   [facility] Tuesdays and Thursdays  [facility] Wednesdays-virtual visits only    Time Spent: 48 minutes  Encounter Type: Individual    Any diabetes medication dose changes were made via the CDE Protocol per the patient's referring provider. A copy of this encounter was shared with the provider.     This note  has been dictated using voice recognition software. Any grammatical or context distortions are unintentional and inherent to the software.

## 2024-12-18 NOTE — PATIENT INSTRUCTIONS
Start Lantus Solostar 10 units at bedtime.  Continue to increase Lantus by 2 units every 3 days until your morning blood sugar is 100-130 consistently.  Continue Ozempic 0.5 mg every 7 days  Continue metformin extended release 2000 mg once daily at the evening meal.  When your blood sugars above 200 continue to drink a lot of water and get some movement.  Buy glucose tablets to keep in your car and on your person to treat low blood sugars (less than 70).  Follow-up in 3 weeks to review FreeStyle Carmen 3 data.  The plan will be to discuss increasing Ozempic after surgery.    Blood sugar goals:  Before meals   2 hours after meals: less 180  Bedtime:     A1c: less than 7.0% (estimated average blood sugar of 154 mg/dl)

## 2024-12-18 NOTE — LETTER
12/18/2024         RE: Jojo Mccallum  [address] [facility] [address]        Dear Colleague,    Thank you for referring your patient, Jojo Mccallum, to the [facility]. Please see a copy of my visit note below.    Diabetes Self-Management Education & Support  Type of service:  Video Visit    If the video visit is dropped, the video visit invitation should be resent by: Send to e-mail at: [email or web address]    Originating Location (pt. Location): Home  Distant Location (provider location): Offsite  Mode of Communication:  Video Conference via [employer]    Video Start Time: 9:28 AM  Video End Time (time video stopped): 10:16 AM    How would patient like to obtain AVS? MyChart      ASSESSMENT:  1 month follow-up for diabetes education and counseling.  Jojo is using the Freestyle Carmen 3 CGM to monitor blood sugars.  Her blood sugars have decreased but continue above goal.   She is currently taking Ozempic 0.5 milligrams every 7 days.  Her main side effect is significant constipation.  Patient is using stool softeners, drinking a lot of water and increasing fiber.  Yesterday she had to leave work early due to stomach discomfort from constipation.  We discussed not increasing Ozempic at this time.  Jojo will be having a cholecystectomy the end of January.  To decrease blood sugars will start once daily Lantus insulin.  Patient was on insulin in the past.  Reviewed symptoms and treatment of hypoglycemia.    PLAN:  Start Lantus Solostar 10 units at bedtime.  Continue to increase Lantus by 2 units every 3 days until your morning blood sugar is 100-130 consistently.  Continue Ozempic 0.5 mg every 7 days  Continue metformin extended release 2000 mg once daily at the evening meal.  When your blood sugars above 200 continue to drink a lot of water and get some movement.  Buy glucose tablets to keep in your car and on your person to treat low blood sugars (less than 70).  Follow-up  in 3 weeks to review FreeStyle Carmen 3 data.  The plan will be to discuss increasing Ozempic after surgery.    REPORTS:                      Patient's most recent   Lab Results   Component Value Date    A1C 12.0 11/05/2024    A1C 10.2 06/03/2021     is not meeting goal of <7.0    Diabetes knowledge and skills assessment:   Patient is knowledgeable in diabetes management concepts related to: Healthy Eating, Being Active, Monitoring, Taking Medication, and Healthy Coping    Continue education with the following diabetes management concepts: Problem Solving, Reducing Risks, and CGM interpretation and medication management.    Based on learning assessment above, most appropriate setting for further diabetes education would be: Individual setting.      See Care Plan for co-developed, patient-state behavior change goals.  AVS provided for patient today.    Education Materials Provided:  Hypoglycemia Signs and Symptoms  Medication information for Lantus Solostar pen  Diabetes and travel via email, (going to [place] for [place]).    SUBJECTIVE/OBJECTIVE:  Presents for: Follow-up  Accompanied by: Self  Diabetes education in the past 24mo: Yes  Focus of Visit: Taking Medication, CGM  Type of CGM visit: Personal CGM Follow-up  Diabetes type: Type 2  Date of diagnosis: 2017  Disease course: Improving  How confident are you filling out medical forms by yourself:: Quite a bit  Diabetes management related comments/concerns: Blood sugars remain elevated.  Transportation concerns: No  Difficulty affording diabetes medication?: No  Difficulty affording diabetes testing supplies?: No  Other concerns:: Contacts  Cultural Influences/Ethnic Background:   or     Diabetes Symptoms & Complications:  Diabetes Related Symptoms: Fatigue, Polyphagia (increased hunger), Yeast infection  Weight trend: Decreasing  Symptom course: Improving  Disease course: Improving  Complications assessed today?: Yes  Autonomic neuropathy: No  CVA:  "No  Heart disease: No  Nephropathy: No  Peripheral neuropathy: No  Peripheral Vascular Disease: No  Retinopathy: No    Patient Problem List and Family Medical History reviewed for relevant medical history, current medical status, and diabetes risk factors.    Vitals:  LMP 11/28/2024 (Exact Date)   Estimated body mass index is 36.47 kg/m  as calculated from the following:    Height as of 12/5/24: 1.683 m (5' 6.25\").    Weight as of 12/5/24: 103.3 kg (227 lb 11.2 oz).   Last 3 BP:   BP Readings from Last 3 Encounters:   12/05/24 139/81   12/02/24 (!) 160/87   11/05/24 134/82       History   Smoking Status    Former    Types: Cigarettes   Smokeless Tobacco    Never       Labs:  Lab Results   Component Value Date    A1C 12.0 11/05/2024    A1C 10.2 06/03/2021     Lab Results   Component Value Date     12/02/2024     12/02/2024     06/16/2022     06/03/2021     Lab Results   Component Value Date     11/05/2024     06/16/2022     04/08/2021     HDL Cholesterol   Date Value Ref Range Status   04/08/2021 43 (L) >49 mg/dL Final     Direct Measure HDL   Date Value Ref Range Status   11/05/2024 44 (L) >=50 mg/dL Final   ]  GFR Estimate   Date Value Ref Range Status   12/02/2024 >90 >60 mL/min/1.73m2 Final     Comment:     eGFR calculated using 2021 CKD-EPI equation.   06/03/2021 >90 >60 mL/min/[1.73_m2] Final     Comment:     Non  GFR Calc  Starting 12/18/2018, serum creatinine based estimated GFR (eGFR) will be   calculated using the Chronic Kidney Disease Epidemiology Collaboration   (CKD-EPI) equation.       GFR Estimate If Black   Date Value Ref Range Status   06/03/2021 >90 >60 mL/min/[1.73_m2] Final     Comment:      GFR Calc  Starting 12/18/2018, serum creatinine based estimated GFR (eGFR) will be   calculated using the Chronic Kidney Disease Epidemiology Collaboration   (CKD-EPI) equation.       Lab Results   Component Value Date    CR 0.62 " "12/02/2024    CR 0.63 06/03/2021     No results found for: \"MICROALBUMIN\"    Healthy Eating:  Healthy Eating Assessed Today: Yes  Cultural/Tenriism diet restrictions?: No  Do you have any food allergies or intolerances?: Yes  Please list your food allergies / intolerances: Lactose tolerance  Meal planning/habits: Smaller portions, Low carb  Who cooks/prepares meals for you?: Self  Who purchases food in  your home?: Self  How many times a week on average do you eat food made away from home (restaurant/take-out)?: 3  Meals include: Breakfast, Lunch, Dinner, Afternoon Snack, Evening Snack  Breakfast: skips half of the time. Depends on how hungry she is. Drinks water. Sometimes cereal or cream of wheat or lewis, eggs, 1-2 slices of white toast.  Lunch: sandwich on white bread (turkey, chicken or bologna, cheese) lactaid or can of hernandez's soup or ramen or chipotle bowl. water with [place] sugar-free flavoring.  Dinner: Protein (chicken, pork steak), starch (white rice with chicken boullion) and veggies (asparagus, broccoli, brussels sprouts). Lactaid milk or diet soda or water.  Snacks: Loves 1 oz bag spicy chips (hot Cheetos), granola bars, coconut milk jello, veggies or leftovers  Other: Work from home. Job is sedentary. Takes short breaks.  Beverages: Water, Tea, Coffee, Milk, Diet soda  Has patient met with a dietitian in the past?: Yes    Being Active:  Being Active Assessed Today: Yes  Exercise:: Yes (takes dog for walks, yoga. Has a treadmill in front of a TV)  Barrier to exercise: Time    Monitoring:  Monitoring Assessed Today: Yes  Blood Glucose Meter: CGM ([employer] 3 using phone)  Blood glucose trend: Decreasing    Taking Medications:  Diabetes Medication(s)       Biguanides       metFORMIN (GLUCOPHAGE XR) 500 MG 24 hr tablet Take 4 tablets (2,000 mg) by mouth daily (with dinner).       Insulin       insulin glargine (LANTUS SOLOSTAR) 100 UNIT/ML pen Take 10 units daily + use 2 unit prime with each dose " for a total of 12 units/day. Dispense total daily dose of 20 units per day.       Incretin Mimetic Agents       semaglutide (OZEMPIC) 2 MG/3ML pen Inject 0.5 mg subcutaneously every 7 days.     semaglutide (OZEMPIC) 2 MG/3ML pen Inject 0.25 mg subcutaneously every 7 days.     Patient not taking: Reported on 12/5/2024            Taking Medication Assessed Today: Yes  Current Treatments: Oral Medication (taken by mouth), Diet, Non-insulin Injectables  Problems taking diabetes medications regularly?: No  Diabetes medication side effects?: No    Problem Solving:  Problem Solving Assessed Today: Yes  Is the patient at risk for hypoglycemia?: No  Is the patient at risk for DKA?: Yes    Reducing Risks:  Reducing Risks Assessed Today: Yes  Diabetes Risks: History of gestational diabetes, Hyperlipidemia, Family History, Ethnicity  Additional female risks: PCOS, Gestational Diabetes  CAD Risks: Diabetes Mellitus, Dyslipidemia, Obesity, Sedentary lifestyle, Hypertension  Has dilated eye exam at least once a year?: Yes  Sees dentist every 6 months?: No  Feet checked by healthcare provider in the last year?: Yes    Healthy Coping:  Healthy Coping Assessed Today: Yes  Emotional response to diabetes: Ready to learn  Informal Support system:: Family, Friends, Spouse  Stage of change: ACTION (Actively working towards change)  Patient Activation Measure Survey Score:      2/25/2020     8:39 AM   AMANDA Score (Last Two)   AMANDA Raw Score 34   Activation Score 68.9   AMANDA Level 3     Care Plan and Education Provided:  Monitoring: Individual glucose targets and Log and interpret results and Taking Medication: Action of prescribed medication(s), Administering and storing injectable diabetes medications, and Side effects of prescribed medication(s)      Carey Dillon RD, LD, [facility]   Certified Diabetes Care and   [facility]  [facility]  [facility]its only    Time Spent: 48 minutes  Encounter Type: Individual    Any diabetes medication dose changes were made via the CDE Protocol per the patient's referring provider. A copy of this encounter was shared with the provider.     This note has been dictated using voice recognition software. Any grammatical or context distortions are unintentional and inherent to the software.

## 2024-12-18 NOTE — LETTER
12/18/2024         RE: Jojo Mccallum  1965 [address] [facility] [address]        Dear Colleague,    Thank you for referring your patient, Jojo Mccallum, to the [facility]. Please see a copy of my visit note below.    Diabetes Self-Management Education & Support  Type of service:  Video Visit    If the video visit is dropped, the video visit invitation should be resent by: Send to e-mail at: [email or web address]    Originating Location (pt. Location): Home  Distant Location (provider location): Offsite  Mode of Communication:  Video Conference via [employer]    Video Start Time: 9:28 AM  Video End Time (time video stopped): 10:16 AM    How would patient like to obtain AVS? MyChart      ASSESSMENT:  1 month follow-up for diabetes education and counseling.  Jojo is using the Freestyle Carmen 3 CGM to monitor blood sugars.  Her blood sugars have decreased but continue above goal.   She is currently taking Ozempic 0.5 milligrams every 7 days.  Her main side effect is significant constipation.  Patient is using stool softeners, drinking a lot of water and increasing fiber.  Yesterday she had to leave work early due to stomach discomfort from constipation.  We discussed not increasing Ozempic at this time.  Jojo will be having a cholecystectomy the end of January.  To decrease blood sugars will start once daily Lantus insulin.  Patient was on insulin in the past.  Reviewed symptoms and treatment of hypoglycemia.    PLAN:  Start Lantus Solostar 10 units at bedtime.  Continue to increase Lantus by 2 units every 3 days until your morning blood sugar is 100-130 consistently.  Continue Ozempic 0.5 mg every 7 days  Continue metformin extended release 2000 mg once daily at the evening meal.  When your blood sugars above 200 continue to drink a lot of water and get some movement.  Buy glucose tablets to keep in your car and on your person to treat low blood sugars (less than 70).  Follow-up  in 3 weeks to review FreeStyle Carmen 3 data.  The plan will be to discuss increasing Ozempic after surgery.    REPORTS:                      Patient's most recent   Lab Results   Component Value Date    A1C 12.0 11/05/2024    A1C 10.2 06/03/2021     is not meeting goal of <7.0    Diabetes knowledge and skills assessment:   Patient is knowledgeable in diabetes management concepts related to: Healthy Eating, Being Active, Monitoring, Taking Medication, and Healthy Coping    Continue education with the following diabetes management concepts: Problem Solving, Reducing Risks, and CGM interpretation and medication management.    Based on learning assessment above, most appropriate setting for further diabetes education would be: Individual setting.      See Care Plan for co-developed, patient-state behavior change goals.  AVS provided for patient today.    Education Materials Provided:  Hypoglycemia Signs and Symptoms  Medication information for Lantus Solostar pen  Diabetes and travel via email, (going to [place] for [place]).    SUBJECTIVE/OBJECTIVE:  Presents for: Follow-up  Accompanied by: Self  Diabetes education in the past 24mo: Yes  Focus of Visit: Taking Medication, CGM  Type of CGM visit: Personal CGM Follow-up  Diabetes type: Type 2  Date of diagnosis: 2017  Disease course: Improving  How confident are you filling out medical forms by yourself:: Quite a bit  Diabetes management related comments/concerns: Blood sugars remain elevated.  Transportation concerns: No  Difficulty affording diabetes medication?: No  Difficulty affording diabetes testing supplies?: No  Other concerns:: Contacts  Cultural Influences/Ethnic Background:   or     Diabetes Symptoms & Complications:  Diabetes Related Symptoms: Fatigue, Polyphagia (increased hunger), Yeast infection  Weight trend: Decreasing  Symptom course: Improving  Disease course: Improving  Complications assessed today?: Yes  Autonomic neuropathy: No  CVA:  "No  Heart disease: No  Nephropathy: No  Peripheral neuropathy: No  Peripheral Vascular Disease: No  Retinopathy: No    Patient Problem List and Family Medical History reviewed for relevant medical history, current medical status, and diabetes risk factors.    Vitals:  LMP 11/28/2024 (Exact Date)   Estimated body mass index is 36.47 kg/m  as calculated from the following:    Height as of 12/5/24: 1.683 m (5' 6.25\").    Weight as of 12/5/24: 103.3 kg (227 lb 11.2 oz).   Last 3 BP:   BP Readings from Last 3 Encounters:   12/05/24 139/81   12/02/24 (!) 160/87   11/05/24 134/82       History   Smoking Status     Former     Types: Cigarettes   Smokeless Tobacco     Never       Labs:  Lab Results   Component Value Date    A1C 12.0 11/05/2024    A1C 10.2 06/03/2021     Lab Results   Component Value Date     12/02/2024     12/02/2024     06/16/2022     06/03/2021     Lab Results   Component Value Date     11/05/2024     06/16/2022     04/08/2021     HDL Cholesterol   Date Value Ref Range Status   04/08/2021 43 (L) >49 mg/dL Final     Direct Measure HDL   Date Value Ref Range Status   11/05/2024 44 (L) >=50 mg/dL Final   ]  GFR Estimate   Date Value Ref Range Status   12/02/2024 >90 >60 mL/min/1.73m2 Final     Comment:     eGFR calculated using 2021 CKD-EPI equation.   06/03/2021 >90 >60 mL/min/[1.73_m2] Final     Comment:     Non  GFR Calc  Starting 12/18/2018, serum creatinine based estimated GFR (eGFR) will be   calculated using the Chronic Kidney Disease Epidemiology Collaboration   (CKD-EPI) equation.       GFR Estimate If Black   Date Value Ref Range Status   06/03/2021 >90 >60 mL/min/[1.73_m2] Final     Comment:      GFR Calc  Starting 12/18/2018, serum creatinine based estimated GFR (eGFR) will be   calculated using the Chronic Kidney Disease Epidemiology Collaboration   (CKD-EPI) equation.       Lab Results   Component Value Date    CR " "0.62 12/02/2024    CR 0.63 06/03/2021     No results found for: \"MICROALBUMIN\"    Healthy Eating:  Healthy Eating Assessed Today: Yes  Cultural/Sikh diet restrictions?: No  Do you have any food allergies or intolerances?: Yes  Please list your food allergies / intolerances: Lactose tolerance  Meal planning/habits: Smaller portions, Low carb  Who cooks/prepares meals for you?: Self  Who purchases food in  your home?: Self  How many times a week on average do you eat food made away from home (restaurant/take-out)?: 3  Meals include: Breakfast, Lunch, Dinner, Afternoon Snack, Evening Snack  Breakfast: skips half of the time. Depends on how hungry she is. Drinks water. Sometimes cereal or cream of wheat or lewis, eggs, 1-2 slices of white toast.  Lunch: sandwich on white bread (turkey, chicken or bologna, cheese) lactaid or can of hernandez's soup or ramen or chipotle bowl. water with [place] sugar-free flavoring.  Dinner: Protein (chicken, pork steak), starch (white rice with chicken boullion) and veggies (asparagus, broccoli, brussels sprouts). Lactaid milk or diet soda or water.  Snacks: Loves 1 oz bag spicy chips (hot Cheetos), granola bars, coconut milk jello, veggies or leftovers  Other: Work from home. Job is sedentary. Takes short breaks.  Beverages: Water, Tea, Coffee, Milk, Diet soda  Has patient met with a dietitian in the past?: Yes    Being Active:  Being Active Assessed Today: Yes  Exercise:: Yes (takes dog for walks, yoga. Has a treadmill in front of a TV)  Barrier to exercise: Time    Monitoring:  Monitoring Assessed Today: Yes  Blood Glucose Meter: CGM ([employer] 3 using phone)  Blood glucose trend: Decreasing    Taking Medications:  Diabetes Medication(s)       Biguanides       metFORMIN (GLUCOPHAGE XR) 500 MG 24 hr tablet Take 4 tablets (2,000 mg) by mouth daily (with dinner).       Insulin       insulin glargine (LANTUS SOLOSTAR) 100 UNIT/ML pen Take 10 units daily + use 2 unit prime with each " dose for a total of 12 units/day. Dispense total daily dose of 20 units per day.       Incretin Mimetic Agents       semaglutide (OZEMPIC) 2 MG/3ML pen Inject 0.5 mg subcutaneously every 7 days.     semaglutide (OZEMPIC) 2 MG/3ML pen Inject 0.25 mg subcutaneously every 7 days.     Patient not taking: Reported on 12/5/2024            Taking Medication Assessed Today: Yes  Current Treatments: Oral Medication (taken by mouth), Diet, Non-insulin Injectables  Problems taking diabetes medications regularly?: No  Diabetes medication side effects?: No    Problem Solving:  Problem Solving Assessed Today: Yes  Is the patient at risk for hypoglycemia?: No  Is the patient at risk for DKA?: Yes    Reducing Risks:  Reducing Risks Assessed Today: Yes  Diabetes Risks: History of gestational diabetes, Hyperlipidemia, Family History, Ethnicity  Additional female risks: PCOS, Gestational Diabetes  CAD Risks: Diabetes Mellitus, Dyslipidemia, Obesity, Sedentary lifestyle, Hypertension  Has dilated eye exam at least once a year?: Yes  Sees dentist every 6 months?: No  Feet checked by healthcare provider in the last year?: Yes    Healthy Coping:  Healthy Coping Assessed Today: Yes  Emotional response to diabetes: Ready to learn  Informal Support system:: Family, Friends, Spouse  Stage of change: ACTION (Actively working towards change)  Patient Activation Measure Survey Score:      2/25/2020     8:39 AM   AMANDA Score (Last Two)   AMANDA Raw Score 34   Activation Score 68.9   AMANDA Level 3     Care Plan and Education Provided:  Monitoring: Individual glucose targets and Log and interpret results and Taking Medication: Action of prescribed medication(s), Administering and storing injectable diabetes medications, and Side effects of prescribed medication(s)      Carey Dillon RD, LD, CDCES   Certified Diabetes Care and   [facility]ues[facility]  [facility]  Wednesdays-virtual visits only    Time Spent: 48 minutes  Encounter Type: Individual    Any diabetes medication dose changes were made via the CDE Protocol per the patient's referring provider. A copy of this encounter was shared with the provider.     This note has been dictated using voice recognition software. Any grammatical or context distortions are unintentional and inherent to the software.

## 2025-01-02 NOTE — TELEPHONE ENCOUNTER
RECORDS RECEIVED FROM: Type 2 FM with hyperglycemia   DATE RECEIVED: 3/28/2025   NOTES (FOR ALL VISITS) STATUS DETAILS   OFFICE NOTES from referring provider [facility] [place]:  11/5/24 - PCC OV with Emperatriz Arroyo NP   OFFICE NOTES from other specialist [facility] [place]:  12/18/24 - DIET OV with Carey Dillon RD   ED NOTES [facility] [place]:  12/2/24 - ED OV with Dr. Ruggiero   OPERATIVE REPORT  (thyroid, pituitary, adrenal, parathyroid) N/A    MEDICATION LIST Internal    IMAGING      CT (HEAD/NECK/CHEST/ABDOMEN) Internal [facility]:  12/2/24 - CT Abd/Pelvis   LABS     DIABETES: HBGA1C, CREATININE, FASTING LIPIDS, MICROALBUMIN URINE, POTASSIUM, TSH, T4    THYROID: TSH, T4, CBC, THYRODLONULIN, TOTAL T3, FREE T4, CALCITONIN, CEA Care Everywhere / [facility]:  12/2/24 - CBC  12/2/24 - CMP  12/2/24 - Glucose  12/2/24 - Lipase  12/2/24 - Routine UA  11/5/24 - Albumin  11/5/24 - BMP  11/5/24 - HBGA1C  11/5/24 - Lipid    [facility]:  7/1/19 - TSH, T4

## 2025-01-07 NOTE — TELEPHONE ENCOUNTER
Central Prior Authorization Team   Phone: [phone]      PRIOR AUTHORIZATION DENIED    Medication: phentermine hci 37.5 mg tablets - DENIED    Denial Date: 7/1/2020    Denial Rational: This medication is excluded from coverage.        Appeal Information: No appeal available - plan exclusion.  
Central Prior Authorization Team   Phone: [phone]      PA Initiation    Medication: phentermine hci 37.5 mg tablets - INITIATED  Insurance Company: [employer]  Phone: [phone]  Fax: [phone]  Pharmacy Filling the Rx: [id number] [place] [id number] [place] AT [facility]  [address]  Filling Pharmacy Phone: [phone]  Filling Pharmacy Fax: [phone]  Start Date: 7/1/2020        
Prior Authorization Specialty Medication Request    Medication/Dose: phentermine hci 37.5 mg tablets  ICD code (if different than what is on RX):    Previously Tried and Failed:      Important Lab Values:   Rationale:     Insurance Name: [facility]  Insurance ID: [id number]   Insurance Phone Number: [phone]     Pharmacy Information (if different than what is on RX)  Name:  erik              
rodri

## 2025-01-12 ENCOUNTER — HEALTH MAINTENANCE LETTER (OUTPATIENT)
Age: 40
End: 2025-01-12

## 2025-01-14 ENCOUNTER — LAB (OUTPATIENT)
Dept: LAB | Facility: CLINIC | Age: 40
End: 2025-01-14
Payer: COMMERCIAL

## 2025-01-14 ENCOUNTER — VIRTUAL VISIT (OUTPATIENT)
Dept: SURGERY | Facility: CLINIC | Age: 40
End: 2025-01-14
Payer: COMMERCIAL

## 2025-01-14 ENCOUNTER — ANESTHESIA EVENT (OUTPATIENT)
Dept: SURGERY | Facility: AMBULATORY SURGERY CENTER | Age: 40
End: 2025-01-14
Payer: COMMERCIAL

## 2025-01-14 VITALS — HEIGHT: 66 IN | BODY MASS INDEX: 36.48 KG/M2 | WEIGHT: 227 LBS

## 2025-01-14 DIAGNOSIS — Z01.818 PRE-OPERATIVE EXAMINATION: ICD-10-CM

## 2025-01-14 DIAGNOSIS — Z01.818 PRE-OP EVALUATION: Primary | ICD-10-CM

## 2025-01-14 DIAGNOSIS — Z01.818 PRE-OPERATIVE EXAMINATION: Primary | ICD-10-CM

## 2025-01-14 DIAGNOSIS — K80.50 BILIARY COLIC: ICD-10-CM

## 2025-01-14 DIAGNOSIS — E11.9 TYPE 2 DIABETES MELLITUS WITHOUT COMPLICATION, WITHOUT LONG-TERM CURRENT USE OF INSULIN (H): ICD-10-CM

## 2025-01-14 LAB
EST. AVERAGE GLUCOSE BLD GHB EST-MCNC: 209 MG/DL
HBA1C MFR BLD: 8.9 % (ref 0–5.6)

## 2025-01-14 PROCEDURE — 98001 SYNCH AUDIO-VIDEO NEW LOW 30: CPT | Performed by: PHYSICIAN ASSISTANT

## 2025-01-14 PROCEDURE — 83036 HEMOGLOBIN GLYCOSYLATED A1C: CPT

## 2025-01-14 PROCEDURE — 36415 COLL VENOUS BLD VENIPUNCTURE: CPT

## 2025-01-14 ASSESSMENT — ENCOUNTER SYMPTOMS: SEIZURES: 0

## 2025-01-14 ASSESSMENT — LIFESTYLE VARIABLES: TOBACCO_USE: 1

## 2025-01-14 NOTE — PATIENT INSTRUCTIONS
Preparing for Your Surgery      Name:  Jojo Mccallum   MRN:  [id number]   :  1985   Today's Date:  2025         Arriving for surgery:  Surgery date:  25  Arrival time:  5:45 am    Restrictions due to COVID 19:    Please maintain social distance.  Masking is optional.      parking is available for anyone with mobility limitations or disabilities. (Monday- Friday 7 am- 5 pm)    Please come to:    [facility]  [address] [place] [id number]    Please check in on the 5th floor at the Ambulatory Surgery Center.      What can I eat or drink?    -  You may eat and drink normally until 8 hours prior to arrival  time. (Until 9:45 pm 25)  -  You may have clear liquids until 2 hours prior to arrival  time. (Until 3:45 am)    Examples of clear liquids:  Water  Clear broth  Juices (apple, white grape, white cranberry  and cider) without pulp  Noncarbonated, powder based beverages  (lemonade and Matt-Aid)  Sodas (Sprite, 7-Up, ginger ale and seltzer)  Coffee or tea (without milk or cream)  Gatorade      Which medicines can I take?    Hold Aspirin for 7 days before surgery.   Hold Multivitamins for 7 days before surgery.  Hold Supplements for 7 days before surgery.  Hold Ibuprofen (Advil, Motrin) for 1 day before surgery--unless otherwise directed by surgeon.  Hold Naproxen (Aleve) for 4 days before surgery.  Acetaminophen (Tylenol) is okay to take if needed.    No alcohol or cannabis products for 24 hours prior to procedure.      Hold Semaglutide (Ozempic) for 7 days prior to surgery. Reported taken on . Do NOT take any Semaglutide (Ozempic) after 25.    The bedtime prior to surgery (on 25), decrease the Glargine (Lantus) insulin to 16 units.      -  DO NOT take the following medications the day of surgery:  Metformin (Glucophage XR)      -  PLEASE TAKE the following medications the day of surgery:   Cyclopentolate (Cyclogyl) eye  drop  Omeprazole (Prilosec) if needed  Ondansetron (Zofran) if needed  Prednisolone Acetate (Pred Forte) eye drop if needed  Acetaminophen (Tylenol) if needed    How do I prepare myself?  - Please take 2 showers (one the night prior to surgery and one the morning of surgery) using Scrubcare or Hibiclens soap.   You may use your own shampoo and conditioner. No other hair products.     Use this soap only from the neck to your toes. Avoid genital area      Leave the soap on your skin for one minute--then rinse thoroughly.     - Please remove all jewelry and body piercings.  - No lotions, deodorants or fragrance.  - No makeup or fingernail polish.   - Bring your ID and insurance card.    -If you have a Deep Brain Stimulator, a Spinal Cord Stimulator, or any implanted Neuro Device, you must bring the remote to the Surgery Center.         ALL PATIENTS ARE REQUIRED TO HAVE A RESPONSIBLE ADULT TO DRIVE AND BE IN ATTENDANCE WITH THEM FOR 24 HOURS FOLLOWING SURGERY.     Covid testing policy as of 12/06/2022  Your surgeon will notify and schedule you for a COVID test if one is needed before surgery--please direct any questions or COVID symptoms to your surgeon      Questions or Concerns:    -For questions regarding the day of surgery, please contact the Ambulatory Surgery Center at [phone].    -If you have health changes between today and your surgery, please contact your surgeon.     - For questions after surgery, please contact your surgeon's office.      room air

## 2025-01-14 NOTE — H&P
Pre-Operative H & P     CC:  Preoperative exam to assess for increased cardiopulmonary risk while undergoing surgery and anesthesia.    Date of Encounter: 2025  Primary Care Physician:  Kodak Laws     Reason for visit:   Encounter Diagnoses   Name Primary?    Pre-operative examination Yes    Biliary colic     Type 2 diabetes mellitus without complication, without long-term current use of insulin (H)        HPI  Jojo Mccallum is a 39 year old female who presents for pre-operative H & P in preparation for  Procedure Information       Case: [id number] Date/Time: 25    Procedure: CHOLECYSTECTOMY, ROBOT-ASSISTED, LAPAROSCOPIC, WITHOUT CHOLANGIOGRAM (Abdomen)    Anesthesia type: General    Diagnosis: Biliary colic [K80.50]    Pre-op diagnosis: Biliary colic [K80.50]    Location: [place] [address] / [facility]    Providers: Andrew Buenrostro MD            The patient is a 39-year-old woman with a past medical history significant for hypertension, hyperlipidemia, previous smoker, obesity, type 2 diabetes, PCOS, depression, history of cataracts, pars planitis and uveitis. The patient met with Dr. Buenrostro on 24 for ongoing abdominal symptoms and having been found to have gallstones on RUQ ultrasound. She is now scheduled for the procedure as above.     History is obtained from the patient and chart review    Hx of abnormal bleeding or anti-platelet use: none    Menstrual history: Patient's last menstrual period was 2024 (exact date).:      Past Medical History  Past Medical History:   Diagnosis Date    Biliary colic     Depressive disorder     Diabetes (H)     Nexplanon in place 2021    Left    Obesity     PCOS (polycystic ovarian syndrome)        Past Surgical History  Past Surgical History:   Procedure Laterality Date    CATARACT EXTRACTION       SECTION  2014    LASER YAG CAPSULOTOMY         Prior to Admission  Medications  Current Outpatient Medications   Medication Sig Dispense Refill    etonogestrel (NEXPLANON) 68 MG IMPL 1 each by Subdermal route once      insulin glargine (LANTUS SOLOSTAR) 100 UNIT/ML pen Take 10 units daily + use 2 unit prime with each dose for a total of 12 units/day. Dispense total daily dose of 20 units per day. (Patient taking differently: 20 Units at bedtime. Take 10 units daily + use 2 unit prime with each dose for a total of 12 units/day. Dispense total daily dose of 20 units per day.) 15 mL 4    lisinopril (ZESTRIL) 5 MG tablet Take 1 tablet (5 mg) by mouth daily. (Patient taking differently: Take 5 mg by mouth every evening.) 90 tablet 1    metFORMIN (GLUCOPHAGE XR) 500 MG 24 hr tablet Take 4 tablets (2,000 mg) by mouth daily (with dinner). (Patient taking differently: Take 1,000 mg by mouth 2 times daily (with meals).) 360 tablet 3    omeprazole (PRILOSEC) 20 MG DR capsule Take 20 mg by mouth as needed.      ondansetron (ZOFRAN ODT) 4 MG ODT tab Take 1 tablet (4 mg) by mouth every 8 hours as needed for nausea. 20 tablet 0    prednisoLONE acetate (PRED FORTE) 1 % ophthalmic suspension Place 1 drop into both eyes as needed.      rosuvastatin (CRESTOR) 10 MG tablet Take 1 tablet (10 mg) by mouth daily. (Patient taking differently: Take 10 mg by mouth every evening.) 90 tablet 3    semaglutide (OZEMPIC) 2 MG/3ML pen Inject 0.5 mg subcutaneously every 7 days. (Patient taking differently: Inject 0.5 mg subcutaneously every 7 days. WED Last dose 1/8/25) 3 mL 2    blood glucose (NO BRAND SPECIFIED) test strip Use to test blood sugar twice times daily or as directed. To accompany: Blood Glucose Monitor Brands: per insurance. 100 strip 6    blood glucose monitoring (NO BRAND SPECIFIED) meter device kit Use to test blood sugar twice times daily or as directed. Preferred blood glucose meter OR supplies to accompany: Blood Glucose Monitor Brands: per insurance. 1 kit 0    Continuous Glucose Sensor  (FREESTYLE HOLLIE 3 SENSOR) MISC Change every 14 days. 6 each 1    cyclopentolate (CYCLOGYL) 1 % ophthalmic solution INSTILL 1 DROP IN BOTH EYES TWICE DAILY      Insulin Pen Needle (PEN NEEDLES) 32G X 4 MM MISC 1 each daily. 100 each 4    semaglutide (OZEMPIC) 2 MG/3ML pen Inject 0.25 mg subcutaneously every 7 days. 3 mL 0    thin (NO BRAND SPECIFIED) lancets Use with lanceting device. To accompany: Blood Glucose Monitor Brands: per insurance. 100 each 6       Allergies  Allergies   Allergen Reactions    Losartan GI Disturbance     Lactose intolerant       Social History  Social History     Socioeconomic History    Marital status:      Spouse name: Not on file    Number of children: Not on file    Years of education: Not on file    Highest education level: Not on file   Occupational History    Not on file   Tobacco Use    Smoking status: Former     Current packs/day: 0.10     Types: Cigarettes     Passive exposure: Current    Smokeless tobacco: Never   Substance and Sexual Activity    Alcohol use: Not Currently    Drug use: Yes     Types: Marijuana     Comment: smoking every other day and edibles once a month    Sexual activity: Yes     Partners: Male     Birth control/protection: Implant   Other Topics Concern    Parent/sibling w/ CABG, MI or angioplasty before 65F 55M? Not Asked   Social History Narrative    Not on file     Social Drivers of Health     Financial Resource Strain: Not on file   Food Insecurity: Not on file   Transportation Needs: Not on file   Physical Activity: Not on file   Stress: Not on file   Social Connections: Not on file   Interpersonal Safety: Low Risk  (11/5/2024)    Interpersonal Safety     Do you feel physically and emotionally safe where you currently live?: Yes     Within the past 12 months, have you been hit, slapped, kicked or otherwise physically hurt by someone?: No     Within the past 12 months, have you been humiliated or emotionally abused in other ways by your partner or  ex-partner?: No   Housing Stability: Not on file       Family History  Family History   Problem Relation Age of Onset    Diabetes Mother     Obesity Mother     Kidney Disease Father     Dialysis Father     Obesity Brother     Obesity Brother     Diabetes Maternal Grandmother     Cerebrovascular Disease Maternal Grandmother         stroke    Anesthesia Reaction No family hx of     Deep Vein Thrombosis (DVT) No family hx of        Review of Systems  The complete review of systems is negative other than noted in the HPI or here.   Anesthesia Evaluation   Pt has had prior anesthetic. Type: MAC and Regional.    History of anesthetic complications  - .  felt incision during csection with epidural.    ROS/MED HX  ENT/Pulmonary:     (+)                tobacco use, Past use,                       Neurologic:  - neg neurologic ROS  (-) no seizures, no CVA and no TIA   Cardiovascular:     (+) Dyslipidemia hypertension- -   -  - -                                      METS/Exercise Tolerance: 4 - Raking leaves, gardening    Hematologic:  - neg hematologic  ROS     Musculoskeletal:  - neg musculoskeletal ROS     GI/Hepatic: Comment: Biliary colic       Renal/Genitourinary: Comment: PCOS      Endo:     (+)  type II DM, Last HgA1c: 12.0, date: 11/5/24, Using insulin, - not using insulin pump.         Obesity,       Psychiatric/Substance Use:     (+) psychiatric history depression       Infectious Disease:  - neg infectious disease ROS     Malignancy:  - neg malignancy ROS     Other:  - neg other ROS          Virtual visit -  No vitals were obtained    Physical Exam  Constitutional: Awake, alert, cooperative, no apparent distress, and appears stated age.  Eyes: Pupils equal  HENT: Normocephalic  Respiratory: non labored breathing   Neurologic: Awake, alert, oriented to name, place and time.   Neuropsychiatric: Calm, cooperative. Normal affect.      Prior Labs/Diagnostic Studies   All labs and imaging personally reviewed    Latest  Reference Range & Units 12/02/24 11:10   Sodium 135 - 145 mmol/L 137   Potassium 3.4 - 5.3 mmol/L 3.8   Chloride 98 - 107 mmol/L 100   Carbon Dioxide (CO2) 22 - 29 mmol/L 24   Urea Nitrogen 6.0 - 20.0 mg/dL 11.6   Creatinine 0.51 - 0.95 mg/dL 0.62   GFR Estimate >60 mL/min/1.73m2 >90   Calcium 8.8 - 10.4 mg/dL 9.4   Anion Gap 7 - 15 mmol/L 13   Albumin 3.5 - 5.2 g/dL 4.4   Protein Total 6.4 - 8.3 g/dL 7.9   Alkaline Phosphatase 40 - 150 U/L 84   ALT 0 - 50 U/L 24   AST 0 - 45 U/L 15   Bilirubin Total <=1.2 mg/dL 0.7   Glucose 70 - 99 mg/dL 391 (H)   HCG Qualitative Serum Negative  Negative   Lipase 13 - 60 U/L 28   WBC 4.0 - 11.0 10e3/uL 15.5 (H)   Hemoglobin 11.7 - 15.7 g/dL 13.8   Hematocrit 35.0 - 47.0 % 41.3   Platelet Count 150 - 450 10e3/uL 289   RBC Count 3.80 - 5.20 10e6/uL 4.92   MCV 78 - 100 fL 84   MCH 26.5 - 33.0 pg 28.0   MCHC 31.5 - 36.5 g/dL 33.4   RDW 10.0 - 15.0 % 11.7   % Neutrophils % 89   % Lymphocytes % 6   % Monocytes % 4   % Eosinophils % 0   % Basophils % 0   Absolute Basophils 0.0 - 0.2 10e3/uL 0.0   Absolute Eosinophils 0.0 - 0.7 10e3/uL 0.0   Absolute Immature Granulocytes <=0.4 10e3/uL 0.1   Absolute Lymphocytes 0.8 - 5.3 10e3/uL 1.0   Absolute Monocytes 0.0 - 1.3 10e3/uL 0.7   % Immature Granulocytes % 1   Absolute Neutrophils 1.6 - 8.3 10e3/uL 13.8 (H)   Absolute NRBCs 10e3/uL 0.0   NRBCs per 100 WBC <1 /100 0      Latest Reference Range & Units 11/05/24 15:45   Hemoglobin A1C 0.0 - 5.6 % 12.0 (H)   (H): Data is abnormally high    EKG/ stress test - if available please see in ROS above     The patient's records and results personally reviewed by this provider.     Outside records reviewed from: Care Everywhere    LABS   Latest Reference Range & Units 01/14/25 12:16   Estimated Average Glucose <117 mg/dL 209 (H)   Hemoglobin A1C 0.0 - 5.6 % 8.9 (H)   (H): Data is abnormally high    Assessment    Jojo Mccallum is a 39 year old female seen as a PAC referral for risk assessment and  "optimization for anesthesia.    Plan/Recommendations  Pt will be optimized for the proposed procedure.  See below for details on the assessment, risk, and preoperative recommendations    NEUROLOGY  - No history of TIA, CVA or seizure  -Post Op delirium risk factors:  No risk identified    ENT  - No current airway concerns.  Will need to be reassessed day of surgery.  Mallampati: Unable to assess  TM: Unable to assess  ~ history of cataract/par planitis/ uveitis    CARDIAC  - Hypertension  Well controlled  - Hyperlipidemia  Well controlled on home regimen  - METS (Metabolic Equivalents)  Patient performs 4 or more METS exercise without symptoms             Total Score: 0      RCRI-Moderate risk: Class 3  6.6% complication rate             Total Score: 2    RCRI: High Risk Surgery    RCRI: Diabetes    ~The patient is active and denies any cardiac symptoms. No further testing indicated.     PULMONARY  - Obstructive Sleep Apnea  No current risk of obstructive sleep apnea   MELONIE Low Risk             Total Score: 2    MELONIE: Hypertension    MELONIE: BMI over 35 kg/m2      - Denies asthma or inhaler use  - Tobacco History    History   Smoking Status    Former    Types: Cigarettes   Smokeless Tobacco    Never       GI  - GERD  Controlled on medications: Proton Pump Inhibitor - patient takes as needed but will plan to take on the day of surgery.   - Biliary colic - procedure as above.   ~Lactose intolerant  PONV Medium Risk  Total Score: 2           1 AN PONV: Pt is Female    1 AN PONV: Patient is not a current smoker        /RENAL  - Baseline Creatinine  0.62  ~ PCOS -patient has Nexplanon    ENDOCRINE    - BMI: Estimated body mass index is 36.36 kg/m  as calculated from the following:    Height as of this encounter: 1.683 m (5' 6.25\").    Weight as of this encounter: 103 kg (227 lb).  Obesity (BMI >30)  - Diabetes  Hemoglobin A1C (%)   Date Value   11/05/2024 12.0 (H)   06/03/2021 10.2 (H)     Diabetes Type 2, insulin " dependent. Hold morning oral hypoglycemic medications and short acting insulin DOS. Take 80% of last scheduled dose of long-acting insulin prior to procedure.  Recommend close monitoring of the patient's blood glucose levels throughout the perioperative period.  The patient has been working with her diabetes educator and has a freestyle tyron.  She reports that she believes her A1c is now more in the 8 range.  We did discuss getting an actual repeat A1c even though it has been only 2 months since her last 1.  We discussed the ASC exclusion criteria and will decide if changes need to be made based on her updated A1c that she will get today.  The patient will otherwise plan to hold Ozempic for 7 days prior.     ** The patient's A1c came back at 8.9. Have let there surgical team know that she meets the criteria to proceed as the ASC. Still have let her surgical team know about the updated A1c to make sure they are fine proceeding. **    HEME  VTE Low Risk 0.26%             Total Score: 0      - No history of abnormal bleeding or antiplatelet use.    MSK  Patient is NOT Frail             Total Score: 0        PSYCH  -Depression-patient reports this is seasonal and currently feels things are stable    Different anesthesia methods/types have been discussed with the patient, but they are aware that the final plan will be decided by the assigned anesthesia provider on the date of service.    The patient is optimized for their procedure. AVS with information on surgery time/arrival time, meds and NPO status given by nursing staff. No further diagnostic testing indicated.    Please refer to the physical examination documented by the anesthesiologist in the anesthesia record on the day of surgery.    Video-Visit Details    Type of service:  Video Visit    Provider received verbal consent for a Video Visit from the patient? Yes     Originating Location (pt. Location): Home    Distant Location (provider location):   Off-site  Mode of Communication:  Video Conference via [employer]  On the day of service:     Prep time: 6 minutes  Visit time: 15 minutes  Documentation time: 16 minutes  ------------------------------------------  Total time: 37 minutes      Nicole Fernandes PA-C  Preoperative Assessment Center  [facility]  Clinic and Surgery Center  Phone: [phone]  Fax: [phone]

## 2025-01-14 NOTE — PROGRESS NOTES
Jojo is a 39 year old who is being evaluated via a billable video visit.      How would you like to obtain your AVS? Merle Lee   Jojo is a 39 year old, presenting for the following health issues:  Pre-Op Exam    CHARISSA Ventura LPN

## 2025-01-29 ENCOUNTER — HOSPITAL ENCOUNTER (OUTPATIENT)
Facility: AMBULATORY SURGERY CENTER | Age: 40
Discharge: HOME | End: 2025-01-29
Attending: SURGERY
Payer: COMMERCIAL

## 2025-01-29 ENCOUNTER — ANESTHESIA (OUTPATIENT)
Dept: SURGERY | Facility: AMBULATORY SURGERY CENTER | Age: 40
End: 2025-01-29
Payer: COMMERCIAL

## 2025-01-29 VITALS
WEIGHT: 213 LBS | TEMPERATURE: 97 F | DIASTOLIC BLOOD PRESSURE: 76 MMHG | BODY MASS INDEX: 34.12 KG/M2 | RESPIRATION RATE: 16 BRPM | OXYGEN SATURATION: 98 % | SYSTOLIC BLOOD PRESSURE: 116 MMHG | HEART RATE: 83 BPM

## 2025-01-29 DIAGNOSIS — K80.50 BILIARY COLIC: ICD-10-CM

## 2025-01-29 LAB
GLUCOSE BLDC GLUCOMTR-MCNC: 211 MG/DL (ref 70–99)
GLUCOSE BLDC GLUCOMTR-MCNC: 279 MG/DL (ref 70–99)

## 2025-01-29 PROCEDURE — 82962 GLUCOSE BLOOD TEST: CPT | Performed by: PATHOLOGY

## 2025-01-29 PROCEDURE — 88304 TISSUE EXAM BY PATHOLOGIST: CPT | Mod: 26 | Performed by: PATHOLOGY

## 2025-01-29 PROCEDURE — 88304 TISSUE EXAM BY PATHOLOGIST: CPT | Mod: TC | Performed by: SURGERY

## 2025-01-29 RX ORDER — FENTANYL CITRATE 50 UG/ML
25 INJECTION, SOLUTION INTRAMUSCULAR; INTRAVENOUS EVERY 5 MIN PRN
Status: DISCONTINUED | OUTPATIENT
Start: 2025-01-29 | End: 2025-01-30 | Stop reason: HOSPADM

## 2025-01-29 RX ORDER — ONDANSETRON 2 MG/ML
4 INJECTION INTRAMUSCULAR; INTRAVENOUS EVERY 30 MIN PRN
Status: DISCONTINUED | OUTPATIENT
Start: 2025-01-29 | End: 2025-01-30 | Stop reason: HOSPADM

## 2025-01-29 RX ORDER — ACETAMINOPHEN 325 MG/1
975 TABLET ORAL ONCE
Status: DISCONTINUED | OUTPATIENT
Start: 2025-01-29 | End: 2025-01-30 | Stop reason: HOSPADM

## 2025-01-29 RX ORDER — KETOROLAC TROMETHAMINE 30 MG/ML
15 INJECTION, SOLUTION INTRAMUSCULAR; INTRAVENOUS
Status: DISCONTINUED | OUTPATIENT
Start: 2025-01-29 | End: 2025-01-30 | Stop reason: HOSPADM

## 2025-01-29 RX ORDER — SODIUM CHLORIDE, SODIUM LACTATE, POTASSIUM CHLORIDE, CALCIUM CHLORIDE 600; 310; 30; 20 MG/100ML; MG/100ML; MG/100ML; MG/100ML
INJECTION, SOLUTION INTRAVENOUS CONTINUOUS
Status: DISCONTINUED | OUTPATIENT
Start: 2025-01-29 | End: 2025-01-29 | Stop reason: HOSPADM

## 2025-01-29 RX ORDER — SODIUM CHLORIDE, SODIUM LACTATE, POTASSIUM CHLORIDE, CALCIUM CHLORIDE 600; 310; 30; 20 MG/100ML; MG/100ML; MG/100ML; MG/100ML
INJECTION, SOLUTION INTRAVENOUS CONTINUOUS
Status: DISCONTINUED | OUTPATIENT
Start: 2025-01-29 | End: 2025-01-30 | Stop reason: HOSPADM

## 2025-01-29 RX ORDER — ACETAMINOPHEN 325 MG/1
975 TABLET ORAL ONCE
Status: COMPLETED | OUTPATIENT
Start: 2025-01-29 | End: 2025-01-29

## 2025-01-29 RX ORDER — LABETALOL HYDROCHLORIDE 5 MG/ML
10 INJECTION, SOLUTION INTRAVENOUS
Status: DISCONTINUED | OUTPATIENT
Start: 2025-01-29 | End: 2025-01-30 | Stop reason: HOSPADM

## 2025-01-29 RX ORDER — KETOROLAC TROMETHAMINE 30 MG/ML
INJECTION, SOLUTION INTRAMUSCULAR; INTRAVENOUS PRN
Status: DISCONTINUED | OUTPATIENT
Start: 2025-01-29 | End: 2025-01-29

## 2025-01-29 RX ORDER — CEFAZOLIN SODIUM 2 G/50ML
2 SOLUTION INTRAVENOUS
Status: COMPLETED | OUTPATIENT
Start: 2025-01-29 | End: 2025-01-29

## 2025-01-29 RX ORDER — OXYCODONE HYDROCHLORIDE 5 MG/1
5 TABLET ORAL
Status: COMPLETED | OUTPATIENT
Start: 2025-01-29 | End: 2025-01-29

## 2025-01-29 RX ORDER — METOPROLOL TARTRATE 1 MG/ML
INJECTION, SOLUTION INTRAVENOUS PRN
Status: DISCONTINUED | OUTPATIENT
Start: 2025-01-29 | End: 2025-01-29

## 2025-01-29 RX ORDER — DIPHENHYDRAMINE HYDROCHLORIDE 50 MG/ML
25 INJECTION INTRAMUSCULAR; INTRAVENOUS EVERY 6 HOURS PRN
Status: DISCONTINUED | OUTPATIENT
Start: 2025-01-29 | End: 2025-01-30 | Stop reason: HOSPADM

## 2025-01-29 RX ORDER — LIDOCAINE HYDROCHLORIDE 20 MG/ML
INJECTION, SOLUTION INFILTRATION; PERINEURAL PRN
Status: DISCONTINUED | OUTPATIENT
Start: 2025-01-29 | End: 2025-01-29

## 2025-01-29 RX ORDER — ONDANSETRON 4 MG/1
4 TABLET, ORALLY DISINTEGRATING ORAL EVERY 30 MIN PRN
Status: DISCONTINUED | OUTPATIENT
Start: 2025-01-29 | End: 2025-01-30 | Stop reason: HOSPADM

## 2025-01-29 RX ORDER — DEXAMETHASONE SODIUM PHOSPHATE 10 MG/ML
4 INJECTION, SOLUTION INTRAMUSCULAR; INTRAVENOUS
Status: DISCONTINUED | OUTPATIENT
Start: 2025-01-29 | End: 2025-01-30 | Stop reason: HOSPADM

## 2025-01-29 RX ORDER — ONDANSETRON 2 MG/ML
INJECTION INTRAMUSCULAR; INTRAVENOUS PRN
Status: DISCONTINUED | OUTPATIENT
Start: 2025-01-29 | End: 2025-01-29

## 2025-01-29 RX ORDER — DIPHENHYDRAMINE HCL 25 MG
25 CAPSULE ORAL EVERY 6 HOURS PRN
Status: DISCONTINUED | OUTPATIENT
Start: 2025-01-29 | End: 2025-01-30 | Stop reason: HOSPADM

## 2025-01-29 RX ORDER — PROPOFOL 10 MG/ML
INJECTION, EMULSION INTRAVENOUS PRN
Status: DISCONTINUED | OUTPATIENT
Start: 2025-01-29 | End: 2025-01-29

## 2025-01-29 RX ORDER — PROPOFOL 10 MG/ML
INJECTION, EMULSION INTRAVENOUS CONTINUOUS PRN
Status: DISCONTINUED | OUTPATIENT
Start: 2025-01-29 | End: 2025-01-29

## 2025-01-29 RX ORDER — DEXAMETHASONE SODIUM PHOSPHATE 4 MG/ML
INJECTION, SOLUTION INTRA-ARTICULAR; INTRALESIONAL; INTRAMUSCULAR; INTRAVENOUS; SOFT TISSUE PRN
Status: DISCONTINUED | OUTPATIENT
Start: 2025-01-29 | End: 2025-01-29

## 2025-01-29 RX ORDER — HYDROMORPHONE HYDROCHLORIDE 1 MG/ML
0.2 INJECTION, SOLUTION INTRAMUSCULAR; INTRAVENOUS; SUBCUTANEOUS EVERY 5 MIN PRN
Status: DISCONTINUED | OUTPATIENT
Start: 2025-01-29 | End: 2025-01-30 | Stop reason: HOSPADM

## 2025-01-29 RX ORDER — NALOXONE HYDROCHLORIDE 0.4 MG/ML
0.1 INJECTION, SOLUTION INTRAMUSCULAR; INTRAVENOUS; SUBCUTANEOUS
Status: DISCONTINUED | OUTPATIENT
Start: 2025-01-29 | End: 2025-01-30 | Stop reason: HOSPADM

## 2025-01-29 RX ORDER — HYDROMORPHONE HYDROCHLORIDE 1 MG/ML
0.4 INJECTION, SOLUTION INTRAMUSCULAR; INTRAVENOUS; SUBCUTANEOUS EVERY 5 MIN PRN
Status: DISCONTINUED | OUTPATIENT
Start: 2025-01-29 | End: 2025-01-30 | Stop reason: HOSPADM

## 2025-01-29 RX ORDER — FENTANYL CITRATE 50 UG/ML
50 INJECTION, SOLUTION INTRAMUSCULAR; INTRAVENOUS EVERY 5 MIN PRN
Status: DISCONTINUED | OUTPATIENT
Start: 2025-01-29 | End: 2025-01-30 | Stop reason: HOSPADM

## 2025-01-29 RX ORDER — MEPERIDINE HYDROCHLORIDE 25 MG/ML
12.5 INJECTION INTRAMUSCULAR; INTRAVENOUS; SUBCUTANEOUS EVERY 5 MIN PRN
Status: DISCONTINUED | OUTPATIENT
Start: 2025-01-29 | End: 2025-01-30 | Stop reason: HOSPADM

## 2025-01-29 RX ORDER — OXYCODONE HYDROCHLORIDE 5 MG/1
5 TABLET ORAL
Status: DISCONTINUED | OUTPATIENT
Start: 2025-01-29 | End: 2025-01-30 | Stop reason: HOSPADM

## 2025-01-29 RX ORDER — HYDRALAZINE HYDROCHLORIDE 20 MG/ML
2.5-5 INJECTION INTRAMUSCULAR; INTRAVENOUS EVERY 10 MIN PRN
Status: DISCONTINUED | OUTPATIENT
Start: 2025-01-29 | End: 2025-01-30 | Stop reason: HOSPADM

## 2025-01-29 RX ORDER — FENTANYL CITRATE 50 UG/ML
25 INJECTION, SOLUTION INTRAMUSCULAR; INTRAVENOUS
Status: DISCONTINUED | OUTPATIENT
Start: 2025-01-29 | End: 2025-01-30 | Stop reason: HOSPADM

## 2025-01-29 RX ORDER — CEFAZOLIN SODIUM 2 G/50ML
2 SOLUTION INTRAVENOUS SEE ADMIN INSTRUCTIONS
Status: DISCONTINUED | OUTPATIENT
Start: 2025-01-29 | End: 2025-01-29 | Stop reason: HOSPADM

## 2025-01-29 RX ORDER — ALBUTEROL SULFATE 0.83 MG/ML
2.5 SOLUTION RESPIRATORY (INHALATION) EVERY 4 HOURS PRN
Status: DISCONTINUED | OUTPATIENT
Start: 2025-01-29 | End: 2025-01-30 | Stop reason: HOSPADM

## 2025-01-29 RX ORDER — FENTANYL CITRATE 50 UG/ML
INJECTION, SOLUTION INTRAMUSCULAR; INTRAVENOUS PRN
Status: DISCONTINUED | OUTPATIENT
Start: 2025-01-29 | End: 2025-01-29

## 2025-01-29 RX ORDER — LIDOCAINE 40 MG/G
CREAM TOPICAL
Status: DISCONTINUED | OUTPATIENT
Start: 2025-01-29 | End: 2025-01-29 | Stop reason: HOSPADM

## 2025-01-29 RX ORDER — OXYCODONE HYDROCHLORIDE 5 MG/1
5-10 TABLET ORAL EVERY 4 HOURS PRN
Qty: 10 TABLET | Refills: 0 | Status: SHIPPED | OUTPATIENT
Start: 2025-01-29

## 2025-01-29 RX ORDER — OXYCODONE HYDROCHLORIDE 5 MG/1
10 TABLET ORAL
Status: DISCONTINUED | OUTPATIENT
Start: 2025-01-29 | End: 2025-01-30 | Stop reason: HOSPADM

## 2025-01-29 RX ORDER — BUPIVACAINE HYDROCHLORIDE 2.5 MG/ML
INJECTION, SOLUTION INFILTRATION; PERINEURAL PRN
Status: DISCONTINUED | OUTPATIENT
Start: 2025-01-29 | End: 2025-01-29 | Stop reason: HOSPADM

## 2025-01-29 RX ADMIN — PROPOFOL 200 MCG/KG/MIN: 10 INJECTION, EMULSION INTRAVENOUS at 07:42

## 2025-01-29 RX ADMIN — ONDANSETRON 4 MG: 2 INJECTION INTRAMUSCULAR; INTRAVENOUS at 08:39

## 2025-01-29 RX ADMIN — METOPROLOL TARTRATE 2.5 MG: 1 INJECTION, SOLUTION INTRAVENOUS at 08:09

## 2025-01-29 RX ADMIN — DEXAMETHASONE SODIUM PHOSPHATE 4 MG: 4 INJECTION, SOLUTION INTRA-ARTICULAR; INTRALESIONAL; INTRAMUSCULAR; INTRAVENOUS; SOFT TISSUE at 07:27

## 2025-01-29 RX ADMIN — Medication 20 MG: at 07:39

## 2025-01-29 RX ADMIN — PROPOFOL 200 MCG/KG/MIN: 10 INJECTION, EMULSION INTRAVENOUS at 07:19

## 2025-01-29 RX ADMIN — Medication 0.5 MG: at 07:38

## 2025-01-29 RX ADMIN — PROPOFOL 100 MG: 10 INJECTION, EMULSION INTRAVENOUS at 07:19

## 2025-01-29 RX ADMIN — SODIUM CHLORIDE, SODIUM LACTATE, POTASSIUM CHLORIDE, CALCIUM CHLORIDE: 600; 310; 30; 20 INJECTION, SOLUTION INTRAVENOUS at 06:49

## 2025-01-29 RX ADMIN — Medication 50 MG: at 07:20

## 2025-01-29 RX ADMIN — FENTANYL CITRATE 50 MCG: 50 INJECTION, SOLUTION INTRAMUSCULAR; INTRAVENOUS at 07:19

## 2025-01-29 RX ADMIN — OXYCODONE HYDROCHLORIDE 5 MG: 5 TABLET ORAL at 09:15

## 2025-01-29 RX ADMIN — LIDOCAINE HYDROCHLORIDE 100 MG: 20 INJECTION, SOLUTION INFILTRATION; PERINEURAL at 07:19

## 2025-01-29 RX ADMIN — PROPOFOL 150 MCG/KG/MIN: 10 INJECTION, EMULSION INTRAVENOUS at 08:09

## 2025-01-29 RX ADMIN — FENTANYL CITRATE 50 MCG: 50 INJECTION, SOLUTION INTRAMUSCULAR; INTRAVENOUS at 07:27

## 2025-01-29 RX ADMIN — KETOROLAC TROMETHAMINE 30 MG: 30 INJECTION, SOLUTION INTRAMUSCULAR; INTRAVENOUS at 08:39

## 2025-01-29 RX ADMIN — CEFAZOLIN SODIUM 2 G: 2 SOLUTION INTRAVENOUS at 07:11

## 2025-01-29 RX ADMIN — ACETAMINOPHEN 975 MG: 325 TABLET ORAL at 06:31

## 2025-01-29 ASSESSMENT — LIFESTYLE VARIABLES: TOBACCO_USE: 1

## 2025-01-29 ASSESSMENT — ENCOUNTER SYMPTOMS: SEIZURES: 0

## 2025-01-29 NOTE — OP NOTE
[facility] [place]    Operative Note    Pre-operative diagnosis: Biliary colic [K80.50]; acute cholecystitis.   Post-operative diagnosis    Procedure: Procedure(s):  CHOLECYSTECTOMY, ROBOT-ASSISTED, LAPAROSCOPIC   Surgeon: Surgeons and Role:     * Andrew Buenrostro MD - Primary     * Manjinder Merrill MD - Resident - Assisting   Anesthesia: General    Estimated blood loss: Less than 10 ml   Drains: None   Specimens: ID Type Source Tests Collected by Time Destination   1 : Gallbladder with contents Tissue Gallbladder SURGICAL PATHOLOGY EXAM Andrew Buenrostro MD 1/29/2025  8:32 AM       Findings: None.  The gallbladder was minimally inflamed.   Complications: None.   Implants: None.       OPERATIVE INDICATIONS:  Jjoo Mccallum is a 39 year old female with a history of RUQ abdominal pain associated with cholelithiasis on right upper quadrant ultrasound.  Was seen in ED in early December, a clinic course consistent with acute cholecystitis.    After understanding the risks and benefits of proceeding with surgery, the patient has an indication for laparoscopic cholecystectomy and consented to undergo surgery.      Prior to surgery, I reviewed the risks of gallbladder removal with this patient.    The risk discussion included, but was not limited to, death, myocardial infarction, pneumonia, urinary tract infection, deep venous thrombosis with or without pulmonary embolus, abdominal infection from bowel injury or abscess, bowel obstruction, wound infection, and bleeding.    Specific risks related to cholecystectomy include bile duct injury (3-4/1000), bile leak (10/1000), retained common bile duct stone (10/1000), postcholecystectomy diarrhea (1-2%) and these complications may require additional treatment.    The potential that gallbladder removal will NOT be of benefit was also reviewed.    Furthermore, alternative therapies and the option for no therapy were also  reviewed.    Postoperative treatment and expected follow-up were also reviewed.    Additional risk specifically related to this patient's preoperative condition were also emphasized due to class 2 obesity.        OPERATIVE DETAILS:      The patient was brought to the operating room and prepared in a routine fashion.  A timeout was performed prior to surgery and documented by the nursing team.     Under the benefits of general anesthesia, a left upper quadrant Veress needle was inserted and pneumoperitoneum was established using carbon dioxide gas to a maximum pressure of 15 mmHg.  A total of 4 ports were placed and the laparoscope was utilized from the umbilical port.     The patient was moved into a steep reverse Trendelenberg position.    The robot was docked.    The gallbladder was grasped at its fundus and retracted cephalad.  It was also grasped at its infundibulum and retracted laterally.       Findings related to the gallbladder are as noted above.     A complete dissection starting on the gallbladder, identifying the cystic artery on the surface of the gallbladder, was performed.  The cystic artery in this manner was  away from the gallbladder. The infundibulum of the gallbladder and the junction of gallbladder and cystic duct were clearly and completely identified with blunt dissection.  All of this dissection was performed on the gallbladder wall and clearly above the triangle of Calot.    The peritoneum on each side of gallbladder was divided at least one half of the way up towards the top of the gallbladder.     Next, a complete dissection of the upper aspect of the triangle of Calot was performed and the critical view of safety was achieved.  The cystic artery and cystic duct were then the ONLY two structures traversing from gallbladder towards the mariangel hepatis.     The cystic artery and cystic duct were clipped securely and divided between clips. The gallbladder was then removed out of its  fossa using electrocautery.  It was placed into an Endocatch bag.     Complete hemostasis was achieved.     The gallbladder and contents were removed.  The robot was undocked.       The skin was closed using 4-0 monocryl suture and skin glue was applied.     I was present for all critical components of the operation, and all needle and sponge counts were correct x2 at the end of the procedure.    Andrew Buenrostro MD  Surgery  [phone] (hospital )  [phone] (clinic nurses)

## 2025-01-29 NOTE — OR NURSING
Dr. Rushing notified of glucose 279. No treatment at this time. VSS. Patient drowsy from anesthesia, pain 1/10. Patient requiring additional time to wake up.

## 2025-01-29 NOTE — DISCHARGE INSTRUCTIONS
[facility]  Home Care Following Anesthesia  For 24 hours after surgery:  Get plenty of rest.  A responsible adult must stay with you for at least 24 hours after you leave the surgery center.  Do not drive or use heavy equipment.  If you have weakness or tingling, don't drive or use heavy equipment until this feeling goes away.   Do not drink alcohol.   Avoid strenuous or risky activities.  Ask for help when climbing stairs.  You may feel lightheaded.  IF so, sit for a few minutes before standing.  Have someone help you get up.   If you have nausea (feel sick to your stomach): Drink only clear liquids such as apple juice, ginger ale, broth or 7-Up.  Rest may also help.  Be sure to drink enough fluids.  Move to a regular diet as you feel able.   You may have a slight fever.  Call the doctor if your fever is over 100 F (37.7 C) (taken under the tongue) or lasts longer than 24 hours.  You may have a dry mouth, a sore throat, muscle aches or trouble sleeping. These should go away after 24 hours.  Do not make important or legal decisions.   It is recommended to avoid smoking.   If you use hormonal birth control (such as the pill, patch, ring or implants):  You will need a second form of birth control for 7 days (condoms, a diaphragm or contraceptive foam).  While in the surgery center, you received a medicine called Sugammadex.  Hormonal birth control (such as the pill, patch, ring or implants) will not work as well for a week after taking this medicine.         Tips for taking pain medications  To get the best pain relief possible, remember these points:  Take pain medications as directed, before pain becomes severe.  Pain medication can upset your stomach: taking it with food may help.  Constipation is a common side effect of pain medication. Drink plenty of  fluids.  Eat foods high in fiber. Take a stool softener if recommended by your doctor or pharmacist.  Do not drink alcohol,  drive or operate machinery while taking pain medications.  Ask about other ways to control pain, such as with heat, ice or relaxation.    Tylenol/Acetaminophen Consumption    If you feel your pain relief is insufficient, you may take Tylenol/Acetaminophen in addition to your narcotic pain medication.   Be careful not to exceed 4,000 mg of Tylenol/Acetaminophen in a 24 hour period from all sources.  If you are taking extra strength Tylenol/acetaminophen (500 mg), the maximum dose is 8 tablets in 24 hours.  If you are taking regular strength acetaminophen (325 mg), the maximum dose is 12 tablets in 24 hours.    Call a doctor for any of the following:  Signs of infection (fever, growing tenderness at the surgery site, a large amount of drainage or bleeding, severe pain, foul-smelling drainage, redness, swelling).  It has been over 8 to 10 hours since surgery and you are still not able to urinate (pass water).  Headache for over 24 hours.  Numbness, tingling or weakness the day after surgery (if you had spinal anesthesia).  Signs of Covid-19 infection (temperature over 100 degrees, shortness of breath, cough, loss of taste/smell, generalized body aches, persistent headache, chills, sore throat, nausea/vomiting/diarrhea)    Your doctor is:  Dr. Andrew Buenrostro, General Surgery: [phone]                    After hours and weekends call the hospital @ [phone] and ask for the resident on call for:  General Surgery  For emergency care, call the:  [facility]:  [phone] (TTY for hearing impaired: [phone])  Toradol 30 mg given at  0830.  Do not take any NSAIDs for 6 hours.  OK after 230 pm.  (Ibuprofen, Advil, Motrin, Naproxen, Aleve).    Tylenol 975 mg given at 0630.   Ok to take more after 1230 pm.

## 2025-01-29 NOTE — ANESTHESIA CARE TRANSFER NOTE
Patient: Jojo Mccallum    Procedure: Procedure(s):  CHOLECYSTECTOMY, ROBOT-ASSISTED, LAPAROSCOPIC       Diagnosis: Biliary colic [K80.50]  Diagnosis Additional Information: No value filed.    Anesthesia Type:   General     Note:    Oropharynx: oropharynx clear of all foreign objects and spontaneously breathing  Level of Consciousness: awake  Oxygen Supplementation: face mask  Level of Supplemental Oxygen (L/min / FiO2): 6  Independent Airway: airway patency satisfactory and stable  Dentition: dentition unchanged  Vital Signs Stable: post-procedure vital signs reviewed and stable  Report to RN Given: handoff report given  Patient transferred to: PACU  Comments: Report given to RN  Handoff Report: Identifed the Patient, Identified the Reponsible Provider, Reviewed the pertinent medical history, Discussed the surgical course, Reviewed Intra-OP anesthesia mangement and issues during anesthesia, Set expectations for post-procedure period and Allowed opportunity for questions and acknowledgement of understanding      Vitals:  Vitals Value Taken Time   BP     Temp     Pulse 89 01/29/25 0858   Resp 2 01/29/25 0858   SpO2 94 % 01/29/25 0858   Vitals shown include unfiled device data.    Electronically Signed By: KELL Chavez CRNA  January 29, 2025  8:59 AM

## 2025-01-29 NOTE — ANESTHESIA POSTPROCEDURE EVALUATION
Patient: Jojo Mccallum    Procedure: Procedure(s):  CHOLECYSTECTOMY, ROBOT-ASSISTED, LAPAROSCOPIC       Anesthesia Type:  General    Note:  Disposition: Outpatient   Postop Pain Control: Uneventful            Sign Out: Well controlled pain   PONV: No   Neuro/Psych: Uneventful            Sign Out: Acceptable/Baseline neuro status   Airway/Respiratory: Uneventful            Sign Out: Acceptable/Baseline resp. status   CV/Hemodynamics: Uneventful            Sign Out: Acceptable CV status; No obvious hypovolemia; No obvious fluid overload   Other NRE: NONE   DID A NON-ROUTINE EVENT OCCUR?            Last vitals:  Vitals Value Taken Time   /64 01/29/25 0930   Temp 36.8  C (98.2  F) 01/29/25 0930   Pulse 81 01/29/25 0930   Resp 16 01/29/25 0930   SpO2 96 % 01/29/25 0930       Electronically Signed By: Joaquin Rushing MD  January 29, 2025  12:22 PM

## 2025-01-29 NOTE — ANESTHESIA PREPROCEDURE EVALUATION
Anesthesia Pre-Procedure Evaluation    Patient: Jojo Mccallum   MRN: [id number] : 1985        Procedure : Procedure(s):  CHOLECYSTECTOMY, ROBOT-ASSISTED, LAPAROSCOPIC, WITHOUT CHOLANGIOGRAM          Past Medical History:   Diagnosis Date    Biliary colic     Depressive disorder     Diabetes (H)     Nexplanon in place 2021    Left    Obesity     PCOS (polycystic ovarian syndrome)       Past Surgical History:   Procedure Laterality Date    CATARACT EXTRACTION       SECTION  2014    LASER YAG CAPSULOTOMY        Allergies   Allergen Reactions    Losartan GI Disturbance     Lactose intolerant      Social History     Tobacco Use    Smoking status: Former     Current packs/day: 0.10     Types: Cigarettes     Passive exposure: Current    Smokeless tobacco: Never   Substance Use Topics    Alcohol use: Not Currently      Wt Readings from Last 1 Encounters:   25 103 kg (227 lb)        Anesthesia Evaluation   Pt has had prior anesthetic. Type: [facility] and [facility].    History of anesthetic complications  - .  felt incision during csection with epidural.    ROS/MED HX  ENT/Pulmonary:     (+)                tobacco use, Past use,                       Neurologic:  - neg neurologic ROS  (-) no seizures, no CVA and no TIA   Cardiovascular:     (+) Dyslipidemia hypertension- -   -  - -                                      METS/Exercise Tolerance: 4 - Raking leaves, gardening    Hematologic:  - neg hematologic  ROS     Musculoskeletal:  - neg musculoskeletal ROS     GI/Hepatic: Comment: Biliary colic       Renal/Genitourinary: Comment: PCOS      Endo:     (+)  type II DM, Last HgA1c: 8.9,  Using insulin, - not using insulin pump.         Obesity,       Psychiatric/Substance Use:     (+) psychiatric history depression       Infectious Disease:  - neg infectious disease ROS     Malignancy:  - neg malignancy ROS     Other:  - neg other ROS          Physical Exam    Airway        Mallampati: II       Respiratory  "Devices and Support         Dental       (+) Minor Abnormalities - some fillings, tiny chips      Cardiovascular          Rhythm and rate: regular     Pulmonary                   OUTSIDE LABS:  CBC:   Lab Results   Component Value Date    WBC 15.5 (H) 12/02/2024    WBC 9.5 09/20/2022    HGB 13.8 12/02/2024    HGB 14.2 09/20/2022    HCT 41.3 12/02/2024    HCT 42.1 09/20/2022     12/02/2024     09/20/2022     BMP:   Lab Results   Component Value Date     12/02/2024     (L) 11/05/2024    POTASSIUM 3.8 12/02/2024    POTASSIUM 3.9 11/05/2024    CHLORIDE 100 12/02/2024    CHLORIDE 98 11/05/2024    CO2 24 12/02/2024    CO2 24 11/05/2024    BUN 11.6 12/02/2024    BUN 12.5 11/05/2024    CR 0.62 12/02/2024    CR 0.47 (L) 11/05/2024     (H) 12/02/2024     (H) 12/02/2024     COAGS: No results found for: \"PTT\", \"INR\", \"FIBR\"  POC:   Lab Results   Component Value Date    HCG Negative 05/25/2021    HCGS Negative 12/02/2024     HEPATIC:   Lab Results   Component Value Date    ALBUMIN 4.4 12/02/2024    PROTTOTAL 7.9 12/02/2024    ALT 24 12/02/2024    AST 15 12/02/2024    ALKPHOS 84 12/02/2024    BILITOTAL 0.7 12/02/2024     OTHER:   Lab Results   Component Value Date    A1C 8.9 (H) 01/14/2025    MELYSSA 9.4 12/02/2024    LIPASE 28 12/02/2024       Anesthesia Plan    ASA Status:  3    NPO Status:  NPO Appropriate    Anesthesia Type: General.     - Airway: ETT   Induction: Intravenous.   Maintenance: TIVA.        Consents    Anesthesia Plan(s) and associated risks, benefits, and realistic alternatives discussed. Questions answered and patient/representative(s) expressed understanding.     - Discussed:     - Discussed with:  Patient            Postoperative Care            Comments:               Joaquin Rushing MD    I have reviewed the pertinent notes and labs in the chart from the past 30 days and (re)examined the patient.  Any updates or changes from those notes are reflected in this " "note.    Clinically Significant Risk Factors Present on Admission                   # Hypertension: Noted on problem list          # DMII: A1C = 8.9 % (Ref range: 0.0 - 5.6 %) within past 6 months    # Obesity: Estimated body mass index is 36.36 kg/m  as calculated from the following:    Height as of 1/14/25: 1.683 m (5' 6.25\").    Weight as of 1/14/25: 103 kg (227 lb).                "

## 2025-01-30 ENCOUNTER — PATIENT OUTREACH (OUTPATIENT)
Dept: ENDOCRINOLOGY | Facility: CLINIC | Age: 40
End: 2025-01-30
Payer: COMMERCIAL

## 2025-01-30 NOTE — PROGRESS NOTES
RN Post-Op/Post-Discharge Care Coordination Note    Ms. Jojo Mccallum is a 39 year old female who underwent laparoscopic cholecystectomy on 1/29 with  Dr. Denilson Buenrostro.  Spoke with Patient.    Support  Patient able to care for self independently     Health Status  Nausea/Vomiting: Patient reports nausea.  Eating/drinking: Patient is able to eat and drink without any complaints.  Diet:  Regular  Bowel habits: Patient reports no bowel movement since surgery.  Drains (CHARBEL): N/A  Fevers/chills: Patient denies any fever or chills.  Incisions: Patient denies any signs and symptoms of infection..  Wound closure:  Skin Sealant  Pain:  educated on icing every hour and taking ES Tylenol every 6 hours  New Medications:  oxycodone    Activity/Restrictions  No lifting in excess of 15-20 pounds for 3-4 weeks    Equipment  None    Pathology reviewed with patient:  No, not yet available    Forms/Letters  No    All of her questions were answered including reviewing restrictions, and wound care.  She will call this office if she has any further questions and/or concerns.       In lieu of a post-op clinic patient that patient would like to be contacted in approximately 7-10 days via telephone.    A copy of this note was routed to the primary surgeon.      Whom and When to Call  Patient acknowledges understanding of how to manage any medication changes and   when to seek medical care.     Patient advised that if after hour medical concerns arise to please call [phone] and choose option 4 to speak to the physician on call.

## 2025-02-05 ENCOUNTER — OFFICE VISIT (OUTPATIENT)
Dept: FAMILY MEDICINE | Facility: CLINIC | Age: 40
End: 2025-02-05
Payer: COMMERCIAL

## 2025-02-05 VITALS
DIASTOLIC BLOOD PRESSURE: 70 MMHG | HEIGHT: 66 IN | BODY MASS INDEX: 34.55 KG/M2 | TEMPERATURE: 98 F | HEART RATE: 95 BPM | WEIGHT: 215 LBS | RESPIRATION RATE: 18 BRPM | OXYGEN SATURATION: 99 % | SYSTOLIC BLOOD PRESSURE: 118 MMHG

## 2025-02-05 DIAGNOSIS — E11.65 TYPE 2 DIABETES MELLITUS WITH HYPERGLYCEMIA, WITHOUT LONG-TERM CURRENT USE OF INSULIN (H): Primary | ICD-10-CM

## 2025-02-05 DIAGNOSIS — E11.29 MICROALBUMINURIA DUE TO TYPE 2 DIABETES MELLITUS (H): ICD-10-CM

## 2025-02-05 DIAGNOSIS — R80.9 MICROALBUMINURIA DUE TO TYPE 2 DIABETES MELLITUS (H): ICD-10-CM

## 2025-02-05 DIAGNOSIS — E66.01 MORBID OBESITY (H): ICD-10-CM

## 2025-02-05 DIAGNOSIS — E11.69 HYPERLIPIDEMIA DUE TO TYPE 2 DIABETES MELLITUS (H): ICD-10-CM

## 2025-02-05 DIAGNOSIS — E11.59 HYPERTENSION ASSOCIATED WITH TYPE 2 DIABETES MELLITUS (H): ICD-10-CM

## 2025-02-05 DIAGNOSIS — E78.5 HYPERLIPIDEMIA DUE TO TYPE 2 DIABETES MELLITUS (H): ICD-10-CM

## 2025-02-05 DIAGNOSIS — I15.2 HYPERTENSION ASSOCIATED WITH TYPE 2 DIABETES MELLITUS (H): ICD-10-CM

## 2025-02-05 PROCEDURE — 99214 OFFICE O/P EST MOD 30 MIN: CPT | Performed by: FAMILY MEDICINE

## 2025-02-05 PROCEDURE — G2211 COMPLEX E/M VISIT ADD ON: HCPCS | Performed by: FAMILY MEDICINE

## 2025-02-05 ASSESSMENT — PATIENT HEALTH QUESTIONNAIRE - PHQ9
10. IF YOU CHECKED OFF ANY PROBLEMS, HOW DIFFICULT HAVE THESE PROBLEMS MADE IT FOR YOU TO DO YOUR WORK, TAKE CARE OF THINGS AT HOME, OR GET ALONG WITH OTHER PEOPLE: SOMEWHAT DIFFICULT
SUM OF ALL RESPONSES TO PHQ QUESTIONS 1-9: 3
SUM OF ALL RESPONSES TO PHQ QUESTIONS 1-9: 3

## 2025-02-05 ASSESSMENT — PAIN SCALES - GENERAL: PAINLEVEL_OUTOF10: MILD PAIN (3)

## 2025-02-05 ASSESSMENT — ANXIETY QUESTIONNAIRES
7. FEELING AFRAID AS IF SOMETHING AWFUL MIGHT HAPPEN: NOT AT ALL
7. FEELING AFRAID AS IF SOMETHING AWFUL MIGHT HAPPEN: NOT AT ALL
6. BECOMING EASILY ANNOYED OR IRRITABLE: NOT AT ALL
IF YOU CHECKED OFF ANY PROBLEMS ON THIS QUESTIONNAIRE, HOW DIFFICULT HAVE THESE PROBLEMS MADE IT FOR YOU TO DO YOUR WORK, TAKE CARE OF THINGS AT HOME, OR GET ALONG WITH OTHER PEOPLE: NOT DIFFICULT AT ALL
GAD7 TOTAL SCORE: 3
1. FEELING NERVOUS, ANXIOUS, OR ON EDGE: SEVERAL DAYS
2. NOT BEING ABLE TO STOP OR CONTROL WORRYING: NOT AT ALL
GAD7 TOTAL SCORE: 3
5. BEING SO RESTLESS THAT IT IS HARD TO SIT STILL: NOT AT ALL
8. IF YOU CHECKED OFF ANY PROBLEMS, HOW DIFFICULT HAVE THESE MADE IT FOR YOU TO DO YOUR WORK, TAKE CARE OF THINGS AT HOME, OR GET ALONG WITH OTHER PEOPLE?: NOT DIFFICULT AT ALL
4. TROUBLE RELAXING: SEVERAL DAYS
GAD7 TOTAL SCORE: 3
3. WORRYING TOO MUCH ABOUT DIFFERENT THINGS: SEVERAL DAYS

## 2025-02-05 NOTE — PROGRESS NOTES
Assessment/Plan:    Type 2 diabetes mellitus with hyperglycemia, without long-term current use of insulin (H)  Type 2 diabetes suboptimal control however significant improvement over past several years.  A1c improving from 12% 8.9% January 14, 2025.  Had been utilizing Lantus prior to scheduled laparoscopic cholecystectomy however has not used since.  Blood sugars have been better controlled and uses freestyle tyron device.  History of microalbuminuria and remains on lisinopril 5 mg daily.  Continues metformin  mg using 4 tablets daily.  Ozempic continues at half milligram weekly dose and does agree to dose increase to 1 mg weekly while titrating to highest tolerated dose.  Reassess at follow-up after April 14, 2025.  - Semaglutide, 1 MG/DOSE, (OZEMPIC) 4 MG/3ML pen  Dispense: 3 mL; Refill: 0    Microalbuminuria due to type 2 diabetes mellitus (H)  Microalbuminuria.  Utilizing lisinopril 5 mg daily for renal protective benefits.    Hyperlipidemia due to type 2 diabetes mellitus (H)  Had been started on rosuvastatin 10 mg daily after recent cholesterol results November 5, 2024 showing significant elevation.  Reassess at follow-up in 3 months with fasting labs at that time.    Hypertension associated with type 2 diabetes mellitus (H)  Blood pressure appears well-controlled currently and will monitor.    Obesity (BMI 35.0-39.9) with comorbidity (H)  Some weight loss is identified with GLP-1 agent.  Utilizing Ozempic half milligram weekly and will increase to 1 mg weekly currently x 4 weeks with anticipated further dose increase to 2 mg weekly if tolerated.  - Semaglutide, 1 MG/DOSE, (OZEMPIC) 4 MG/3ML pen  Dispense: 3 mL; Refill: 0    The longitudinal plan of care for the diagnosis(es)/condition(s) as documented were addressed during this visit. Due to the added complexity in care, I will continue to support Jojo in the subsequent management and with ongoing continuity of care.            Subjective:    Jojo  "STEPHANIE Mccallum is seen today for follow-up assessment.  Type 2 diabetes.  A1c's have ranged between 10.8 and 12% typically.  Improvement 8.9% 2025 while on Ozempic currently half milligram weekly.  Continues metformin  mg using 4 tablets daily.  Did add Lantus between 15 and 20 units daily prior to recent schedule laparoscopic cholecystectomy.  Surgery went well.  No complication.  Continues lisinopril 5 mg daily for renal protective benefits.  Rosuvastatin 10 mg daily for lipid management.  Known depression and anxiety which have remained stable with PHQ-9 questionnaire 3 out of 27 and GABE-7 questionnaire 3 out of 21.  Comprehensive review of systems as above otherwise all negative.      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)  Hobbies:  video games; art projects, reading (historical fiction)      Past Surgical History:   Procedure Laterality Date    CATARACT EXTRACTION       SECTION      DAVINCI LAPAROSCOPIC CHOLECYSTECTOMY WITHOUT GRAMS N/A 2025    Procedure: CHOLECYSTECTOMY, ROBOT-ASSISTED, LAPAROSCOPIC;  Surgeon: Andrew Buenrostro MD;  [place] [facility] [place]    LASER YAG CAPSULOTOMY          Family History   Problem Relation Age of Onset    Diabetes Mother     Obesity Mother     Kidney Disease Father     Dialysis Father     Obesity Brother     Obesity Brother     Diabetes Maternal Grandmother     Cerebrovascular Disease Maternal Grandmother         stroke    Anesthesia Reaction No family hx of     Deep Vein Thrombosis (DVT) No family hx of         Past Medical History:   Diagnosis Date    Biliary colic     Depressive disorder     Diabetes (H)     Nexplanon in place 2021    Left    Obesity  "    PCOS (polycystic ovarian syndrome)         Social History     Tobacco Use    Smoking status: Former     Current packs/day: 0.10     Types: Cigarettes     Passive exposure: Current    Smokeless tobacco: Never   Substance Use Topics    Alcohol use: Not Currently    Drug use: Yes     Types: Marijuana     Comment: smoking every other day and edibles once a month        Current Outpatient Medications   Medication Sig Dispense Refill    blood glucose (NO BRAND SPECIFIED) test strip Use to test blood sugar twice times daily or as directed. To accompany: Blood Glucose Monitor Brands: per insurance. 100 strip 6    blood glucose monitoring (NO BRAND SPECIFIED) meter device kit Use to test blood sugar twice times daily or as directed. Preferred blood glucose meter OR supplies to accompany: Blood Glucose Monitor Brands: per insurance. 1 kit 0    Continuous Glucose Sensor (FREESTYLE HOLLIE 3 SENSOR) MISC Change every 14 days. 6 each 1    cyclopentolate (CYCLOGYL) 1 % ophthalmic solution INSTILL 1 DROP IN BOTH EYES TWICE DAILY      etonogestrel (NEXPLANON) 68 MG IMPL 1 each by Subdermal route once      insulin glargine (LANTUS SOLOSTAR) 100 UNIT/ML pen Take 10 units daily + use 2 unit prime with each dose for a total of 12 units/day. Dispense total daily dose of 20 units per day. (Patient taking differently: 20 Units at bedtime. Take 10 units daily + use 2 unit prime with each dose for a total of 12 units/day. Dispense total daily dose of 20 units per day.) 15 mL 4    Insulin Pen Needle (PEN NEEDLES) 32G X 4 MM MISC 1 each daily. 100 each 4    lisinopril (ZESTRIL) 5 MG tablet Take 1 tablet (5 mg) by mouth daily. (Patient taking differently: Take 5 mg by mouth every evening.) 90 tablet 1    metFORMIN (GLUCOPHAGE XR) 500 MG 24 hr tablet Take 4 tablets (2,000 mg) by mouth daily (with dinner). 360 tablet 3    prednisoLONE acetate (PRED FORTE) 1 % ophthalmic suspension Place 1 drop into both eyes as needed.      rosuvastatin  "(CRESTOR) 10 MG tablet Take 1 tablet (10 mg) by mouth daily. (Patient taking differently: Take 10 mg by mouth every evening.) 90 tablet 3    Semaglutide, 1 MG/DOSE, (OZEMPIC) 4 MG/3ML pen Inject 1 mg subcutaneously every 7 days. 3 mL 0    thin (NO BRAND SPECIFIED) lancets Use with lanceting device. To accompany: Blood Glucose Monitor Brands: per insurance. 100 each 6    oxyCODONE (ROXICODONE) 5 MG tablet Take 1-2 tablets (5-10 mg) by mouth every 4 hours as needed for moderate to severe pain. (Patient not taking: Reported on 2/5/2025) 10 tablet 0          Objective:    Vitals:    02/05/25 1235   BP: 118/70   Pulse: 95   Resp: 18   Temp: 98  F (36.7  C)   SpO2: 99%   Weight: 97.5 kg (215 lb)   Height: 1.67 m (5' 5.75\")      Body mass index is 34.97 kg/m .    Alert.  No apparent distress.  Abdominal wall incision sites for recent lap cherrie healing well without signs of wound dehiscence or secondary infection.      This note has been dictated using voice recognition software and as a result may contain minor grammatical errors and unintended word substitutions.         Answers submitted by the patient for this visit:  Patient Health Questionnaire (Submitted on 2/5/2025)  If you checked off any problems, how difficult have these problems made it for you to do your work, take care of things at home, or get along with other people?: Somewhat difficult  PHQ9 TOTAL SCORE: 3  Patient Health Questionnaire (G7) (Submitted on 2/5/2025)  GABE 7 TOTAL SCORE: 3  General Questionnaire (Submitted on 2/5/2025)  Chief Complaint: Chronic problems general questions HPI Form  What is the reason for your visit today? : Surgery follow up I assume  How many servings of fruits and vegetables do you eat daily?: 2-3  On average, how many sweetened beverages do you drink each day (Examples: soda, juice, sweet tea, etc.  Do NOT count diet or artificially sweetened beverages)?: 1  How many minutes a day do you exercise enough to make your heart " beat faster?: 9 or less  How many days a week do you exercise enough to make your heart beat faster?: 3 or less  How many days per week do you miss taking your medication?: 0  Questionnaire about: Chronic problems general questions HPI Form (Submitted on 2/5/2025)  Chief Complaint: Chronic problems general questions HPI Form

## 2025-02-18 DIAGNOSIS — R11.0 NAUSEA: Primary | ICD-10-CM

## 2025-02-18 RX ORDER — ONDANSETRON 4 MG/1
4 TABLET, ORALLY DISINTEGRATING ORAL EVERY 8 HOURS PRN
Qty: 20 TABLET | Refills: 1 | Status: SHIPPED | OUTPATIENT
Start: 2025-02-18

## 2025-02-19 ENCOUNTER — MYC REFILL (OUTPATIENT)
Dept: FAMILY MEDICINE | Facility: CLINIC | Age: 40
End: 2025-02-19
Payer: COMMERCIAL

## 2025-02-19 DIAGNOSIS — E11.65 TYPE 2 DIABETES MELLITUS WITH HYPERGLYCEMIA, WITHOUT LONG-TERM CURRENT USE OF INSULIN (H): ICD-10-CM

## 2025-02-19 DIAGNOSIS — E66.01 MORBID OBESITY (H): ICD-10-CM

## 2025-02-20 DIAGNOSIS — E11.65 TYPE 2 DIABETES MELLITUS WITH HYPERGLYCEMIA, WITHOUT LONG-TERM CURRENT USE OF INSULIN (H): ICD-10-CM

## 2025-02-20 DIAGNOSIS — E66.01 MORBID OBESITY (H): ICD-10-CM

## 2025-02-20 DIAGNOSIS — E11.65 TYPE 2 DIABETES MELLITUS WITH HYPERGLYCEMIA, WITHOUT LONG-TERM CURRENT USE OF INSULIN (H): Primary | ICD-10-CM

## 2025-02-20 RX ORDER — ACYCLOVIR 800 MG/1
TABLET ORAL
Qty: 6 EACH | Refills: 1 | Status: SHIPPED | OUTPATIENT
Start: 2025-02-20

## 2025-02-20 NOTE — TELEPHONE ENCOUNTER
Refill Request  Medication name: Pending Prescriptions:                       Disp   Refills    Continuous Glucose Sensor (FREESTYLE LIBR*6 each 1            Sig: Change every 14 days.    [facility]ed Pharm[facility]  [facility] - [address] [facility] [facility] AT [address]AL TO [facility] [address]

## 2025-02-21 ENCOUNTER — APPOINTMENT (OUTPATIENT)
Dept: CT IMAGING | Facility: HOSPITAL | Age: 40
End: 2025-02-21
Attending: EMERGENCY MEDICINE
Payer: COMMERCIAL

## 2025-02-21 ENCOUNTER — HOSPITAL ENCOUNTER (EMERGENCY)
Facility: HOSPITAL | Age: 40
Discharge: HOME | End: 2025-02-21
Attending: EMERGENCY MEDICINE | Admitting: EMERGENCY MEDICINE
Payer: COMMERCIAL

## 2025-02-21 VITALS
WEIGHT: 215.9 LBS | HEART RATE: 114 BPM | SYSTOLIC BLOOD PRESSURE: 138 MMHG | HEIGHT: 66 IN | BODY MASS INDEX: 34.7 KG/M2 | TEMPERATURE: 97.8 F | OXYGEN SATURATION: 100 % | DIASTOLIC BLOOD PRESSURE: 79 MMHG | RESPIRATION RATE: 20 BRPM

## 2025-02-21 DIAGNOSIS — K59.00 CONSTIPATION, UNSPECIFIED CONSTIPATION TYPE: ICD-10-CM

## 2025-02-21 LAB
ALBUMIN SERPL BCG-MCNC: 4.4 G/DL (ref 3.5–5.2)
ALP SERPL-CCNC: 78 U/L (ref 40–150)
ALT SERPL W P-5'-P-CCNC: 16 U/L (ref 0–50)
ANION GAP SERPL CALCULATED.3IONS-SCNC: 15 MMOL/L (ref 7–15)
AST SERPL W P-5'-P-CCNC: 12 U/L (ref 0–45)
BASOPHILS # BLD AUTO: 0.1 10E3/UL (ref 0–0.2)
BASOPHILS NFR BLD AUTO: 1 %
BILIRUB SERPL-MCNC: 1.1 MG/DL
BUN SERPL-MCNC: 12.4 MG/DL (ref 6–20)
CALCIUM SERPL-MCNC: 9.4 MG/DL (ref 8.8–10.4)
CHLORIDE SERPL-SCNC: 98 MMOL/L (ref 98–107)
CREAT SERPL-MCNC: 0.65 MG/DL (ref 0.51–0.95)
EGFRCR SERPLBLD CKD-EPI 2021: >90 ML/MIN/1.73M2
EOSINOPHIL # BLD AUTO: 0.1 10E3/UL (ref 0–0.7)
EOSINOPHIL NFR BLD AUTO: 1 %
ERYTHROCYTE [DISTWIDTH] IN BLOOD BY AUTOMATED COUNT: 11.7 % (ref 10–15)
GLUCOSE SERPL-MCNC: 345 MG/DL (ref 70–99)
HCO3 SERPL-SCNC: 22 MMOL/L (ref 22–29)
HCT VFR BLD AUTO: 41.3 % (ref 35–47)
HGB BLD-MCNC: 13.5 G/DL (ref 11.7–15.7)
IMM GRANULOCYTES # BLD: 0.1 10E3/UL
IMM GRANULOCYTES NFR BLD: 1 %
LIPASE SERPL-CCNC: 25 U/L (ref 13–60)
LYMPHOCYTES # BLD AUTO: 1.9 10E3/UL (ref 0.8–5.3)
LYMPHOCYTES NFR BLD AUTO: 13 %
MCH RBC QN AUTO: 27.8 PG (ref 26.5–33)
MCHC RBC AUTO-ENTMCNC: 32.7 G/DL (ref 31.5–36.5)
MCV RBC AUTO: 85 FL (ref 78–100)
MONOCYTES # BLD AUTO: 0.6 10E3/UL (ref 0–1.3)
MONOCYTES NFR BLD AUTO: 4 %
NEUTROPHILS # BLD AUTO: 11.7 10E3/UL (ref 1.6–8.3)
NEUTROPHILS NFR BLD AUTO: 81 %
NRBC # BLD AUTO: 0 10E3/UL
NRBC BLD AUTO-RTO: 0 /100
PLATELET # BLD AUTO: 295 10E3/UL (ref 150–450)
POTASSIUM SERPL-SCNC: 4.1 MMOL/L (ref 3.4–5.3)
PROT SERPL-MCNC: 7.8 G/DL (ref 6.4–8.3)
RBC # BLD AUTO: 4.85 10E6/UL (ref 3.8–5.2)
SODIUM SERPL-SCNC: 135 MMOL/L (ref 135–145)
WBC # BLD AUTO: 14.5 10E3/UL (ref 4–11)

## 2025-02-21 PROCEDURE — 85004 AUTOMATED DIFF WBC COUNT: CPT | Performed by: EMERGENCY MEDICINE

## 2025-02-21 PROCEDURE — 82565 ASSAY OF CREATININE: CPT | Performed by: EMERGENCY MEDICINE

## 2025-02-21 PROCEDURE — 96374 THER/PROPH/DIAG INJ IV PUSH: CPT | Mod: 59

## 2025-02-21 PROCEDURE — 258N000003 HC RX IP 258 OP 636: Performed by: EMERGENCY MEDICINE

## 2025-02-21 PROCEDURE — 99285 EMERGENCY DEPT VISIT HI MDM: CPT | Mod: 25

## 2025-02-21 PROCEDURE — 83690 ASSAY OF LIPASE: CPT | Performed by: EMERGENCY MEDICINE

## 2025-02-21 PROCEDURE — 82040 ASSAY OF SERUM ALBUMIN: CPT | Performed by: EMERGENCY MEDICINE

## 2025-02-21 PROCEDURE — 250N000011 HC RX IP 250 OP 636: Performed by: EMERGENCY MEDICINE

## 2025-02-21 PROCEDURE — 96361 HYDRATE IV INFUSION ADD-ON: CPT

## 2025-02-21 PROCEDURE — 85048 AUTOMATED LEUKOCYTE COUNT: CPT | Performed by: EMERGENCY MEDICINE

## 2025-02-21 PROCEDURE — 36415 COLL VENOUS BLD VENIPUNCTURE: CPT | Performed by: EMERGENCY MEDICINE

## 2025-02-21 PROCEDURE — 74177 CT ABD & PELVIS W/CONTRAST: CPT

## 2025-02-21 RX ORDER — KETOROLAC TROMETHAMINE 15 MG/ML
15 INJECTION, SOLUTION INTRAMUSCULAR; INTRAVENOUS ONCE
Status: COMPLETED | OUTPATIENT
Start: 2025-02-21 | End: 2025-02-21

## 2025-02-21 RX ORDER — IOPAMIDOL 755 MG/ML
90 INJECTION, SOLUTION INTRAVASCULAR ONCE
Status: COMPLETED | OUTPATIENT
Start: 2025-02-21 | End: 2025-02-21

## 2025-02-21 RX ADMIN — IOPAMIDOL 90 ML: 755 INJECTION, SOLUTION INTRAVENOUS at 07:42

## 2025-02-21 RX ADMIN — KETOROLAC TROMETHAMINE 15 MG: 15 INJECTION, SOLUTION INTRAMUSCULAR; INTRAVENOUS at 06:51

## 2025-02-21 RX ADMIN — SODIUM CHLORIDE 1000 ML: 0.9 INJECTION, SOLUTION INTRAVENOUS at 06:51

## 2025-02-21 ASSESSMENT — ACTIVITIES OF DAILY LIVING (ADL)
ADLS_ACUITY_SCORE: 41
ADLS_ACUITY_SCORE: 41

## 2025-02-21 NOTE — DISCHARGE INSTRUCTIONS
Commend taking MiraLAX 17 g 2 times daily with increased fluid intake.  Do this for the next 2 or 3 days until you are stooling regularly then you can transition back with a focus on diet and increased fluids to prevent further constipation issues.  In addition recommend continuing periodic over-the-counter enemas.  If this is not effective you can also  a bottle of magnesium citrate which is also available over-the-counter medication these therapies I think we will get your bowels moving please follow-up with your primary care doctor as well as schedule appointment next week for recheck.

## 2025-02-21 NOTE — ED TRIAGE NOTES
Patient had a cholecystectomy three weeks ago. Patient reports that since the surgery she has had only one or two normal bowel movements. She has not had a bowel movement since Monday, she reports that it was very small and hard. Patient has attempted Dulcolax, suppositories, and an enema at home without relief. Patient reports sharp, generalized abdominal pain, and reports a strong urge to have a bowel movement without success.      Triage Assessment (Adult)       Row Name 02/21/25 0626          Triage Assessment    Airway WDL WDL        Respiratory WDL    Respiratory WDL WDL        Skin Circulation/Temperature WDL    Skin Circulation/Temperature WDL WDL

## 2025-02-21 NOTE — ED PROVIDER NOTES
EMERGENCY DEPARTMENT ENCOUNTER      NAME: Jojo Mccallum  AGE: 39 year old female  YOB: 1985  MRN: [id number]  EVALUATION DATE & TIME: 2/21/2025  6:29 AM    PCP: Kodak Laws    ED PROVIDER: Jesse Gallardo MD      Chief Complaint   Patient presents with    Abdominal Pain    Constipation     FINAL IMPRESSION:  1. Constipation, unspecified constipation type        ED COURSE & MEDICAL DECISION MAKING:    Pertinent Labs & Imaging studies reviewed. (See chart for details)  39 year old female presents to the Emergency Department for evaluation of abdominal pain and constipation.  Patient has past medical history for recent cholecystectomy.  Since that time she has been dealing with issues of constipation.  Taking Dulcolax without improvement to her symptomatology or regular production of stool.  She presents emergency department for evaluation of abdominal pain distention and urge to stool but inability to do so.  On examination patient noted to be mildly tachycardic.  Mildly hypertensive.  She appeared fatigued and uncomfortable.  Nonfocal abdominal pain to palpation no appreciable distention remainder of clinical examination unremarkable.  I recommended in light of her recent surgery that we pursue CT scan imaging and laboratory testing to rule out postoperative complications.  Laboratory testing and CT scan overall reassuring.  Increased stool noted consistent with constipation.  I do think there is considerable room for escalation of patient's bowel regimen to get good stool production and had a discussion with patient regarding these interventions and will plan on discharge with escalation of her medical management and close follow-up with her primary care doctor patient is comfortable this plan of care    6:38 AM I introduced myself to the patient, obtained patient history, performed a physical exam, and discussed plan for ED workup including potential diagnostic laboratory/imaging studies and  interventions.         At the conclusion of the encounter I discussed the results of all of the tests and the disposition. The questions were answered. The patient or family acknowledged understanding and was agreeable with the care plan.     Medical Decision Making  Obtained supplemental history:Supplemental history obtained?: No  Reviewed external records: External records reviewed?: No  Care impacted by chronic illness:Diabetes, Hyperlipidemia, and Hypertension  Did you consider but not order tests?: Work up considered but not performed and documented in chart, if applicable  Did you interpret images independently?: Independent interpretation of ECG and images noted in documentation, when applicable.  Consultation discussion with other provider:Did you involve another provider (consultant, , pharmacy, etc.)?: No  Discharge. I prescribed additional prescription strength medication(s) as charted. See documentation for any additional details.    MIPS (CTPE, Dental pain, Chen, Sinusitis, Asthma/COPD, Head Trauma): Not Applicable      MEDICATIONS GIVEN IN THE EMERGENCY:  Medications   sodium chloride 0.9% BOLUS 1,000 mL (1,000 mLs Intravenous $New Bag 2/21/25 0651)   ketorolac (TORADOL) injection 15 mg (15 mg Intravenous $Given 2/21/25 0651)   iopamidol (ISOVUE-370) solution 90 mL (90 mLs Intravenous $Given 2/21/25 0742)       NEW PRESCRIPTIONS STARTED AT TODAY'S ER VISIT  New Prescriptions    No medications on file          =================================================================    HPI    Patient information was obtained from: patient    Use of : N/A        Jojo Mccallum is a 39 year old female with a pertinent history of DM2, hypertension, hyperlipidemia, and cholecystectomy (2025) who presents to this ED by private vehicle with family for evaluation of abdominal pain and constipation.    Patient had a cholecystectomy on 01/29/2025 and has since been feeling abdominal pain and constipation  on and off. The abdominal pain is primarily in her sides bilaterally. She reports that it will be painful for hours and she will sometimes be able to get a little bit of relief with bowel movements, however, she has been in constant pain since 8:00 PM last night. She reports nausea, shakiness, discomfort, and has been feeling bloated since last night as well. Her last bowel movement was this weekend, maybe Monday at the latest. Patient tried laxatives and a saline enema with no relief but has not taken Miralax. She denies any bloody stool. There were no other concerns/complaints at this time.       PAST MEDICAL HISTORY:  Past Medical History:   Diagnosis Date    Biliary colic     Depressive disorder     Diabetes (H)     Nexplanon in place 2021    Left    Obesity     PCOS (polycystic ovarian syndrome)        PAST SURGICAL HISTORY:  Past Surgical History:   Procedure Laterality Date    CATARACT EXTRACTION       SECTION      DAVINCI LAPAROSCOPIC CHOLECYSTECTOMY WITHOUT GRAMS N/A 2025    Procedure: CHOLECYSTECTOMY, ROBOT-ASSISTED, LAPAROSCOPIC;  Surgeon: Andrew Buenrostro MD;  Location: [facility] OR    LASER YAG CAPSULOTOMY             CURRENT MEDICATIONS:    blood glucose (NO BRAND SPECIFIED) test strip  blood glucose monitoring (NO BRAND SPECIFIED) meter device kit  Continuous Glucose Sensor (FREESTYLE HOLLIE 3 SENSOR) MISC  cyclopentolate (CYCLOGYL) 1 % ophthalmic solution  etonogestrel (NEXPLANON) 68 MG IMPL  insulin glargine (LANTUS SOLOSTAR) 100 UNIT/ML pen  Insulin Pen Needle (PEN NEEDLES) 32G X 4 MM MISC  lisinopril (ZESTRIL) 5 MG tablet  metFORMIN (GLUCOPHAGE XR) 500 MG 24 hr tablet  ondansetron (ZOFRAN ODT) 4 MG ODT tab  oxyCODONE (ROXICODONE) 5 MG tablet  prednisoLONE acetate (PRED FORTE) 1 % ophthalmic suspension  rosuvastatin (CRESTOR) 10 MG tablet  Semaglutide, 1 MG/DOSE, (OZEMPIC) 4 MG/3ML pen  Semaglutide, 2 MG/DOSE, (OZEMPIC) 8 MG/3ML pen  thin (NO BRAND SPECIFIED)  "lancets      ALLERGIES:  Allergies   Allergen Reactions    Losartan GI Disturbance     Lactose intolerant       FAMILY HISTORY:  Family History   Problem Relation Age of Onset    Diabetes Mother     Obesity Mother     Kidney Disease Father     Dialysis Father     Obesity Brother     Obesity Brother     Diabetes Maternal Grandmother     Cerebrovascular Disease Maternal Grandmother         stroke    Anesthesia Reaction No family hx of     Deep Vein Thrombosis (DVT) No family hx of        SOCIAL HISTORY:   Social History     Socioeconomic History    Marital status:    Tobacco Use    Smoking status: Former     Current packs/day: 0.10     Types: Cigarettes     Passive exposure: Current    Smokeless tobacco: Never   Substance and Sexual Activity    Alcohol use: Not Currently    Drug use: Yes     Types: Marijuana     Comment: smoking every other day and edibles once a month    Sexual activity: Yes     Partners: Male     Birth control/protection: Implant     Social Drivers of Health     Interpersonal Safety: Low Risk  (1/29/2025)    Interpersonal Safety     Do you feel physically and emotionally safe where you currently live?: Yes     Within the past 12 months, have you been hit, slapped, kicked or otherwise physically hurt by someone?: No     Within the past 12 months, have you been humiliated or emotionally abused in other ways by your partner or ex-partner?: No     VITALS:  BP (!) 132/92   Pulse 112   Temp 97.8  F (36.6  C) (Oral)   Resp 20   Ht 1.67 m (5' 5.75\")   Wt 97.9 kg (215 lb 14.4 oz)   LMP 12/27/2024 (Exact Date)   SpO2 99%   BMI 35.11 kg/m      PHYSICAL EXAM    VITAL SIGNS: BP (!) 132/92   Pulse 112   Temp 97.8  F (36.6  C) (Oral)   Resp 20   Ht 1.67 m (5' 5.75\")   Wt 97.9 kg (215 lb 14.4 oz)   LMP 12/27/2024 (Exact Date)   SpO2 99%   BMI 35.11 kg/m      General Appearance: Well-appearing, well-nourished, no acute distress  Head:  Normocephalic, without obvious abnormality, " atraumatic  Eyes:  PERRL, conjunctiva/corneas clear, EOM's intact,  ENT:  Lips, mucosa, and tongue normal, membranes are moist without pallor  Neck:  Normal ROM, symmetrical, trachea midline    Chest:  No tenderness or deformity, no crepitus  Cardio:  Regular rate and rhythm, no murmur, rub or gallop, 2+ pulses symmetric in all extremities  Pulm:  Clear to auscultation bilaterally, respirations unlabored,  Back:  ROM normal, no CVA tenderness, no spinal tenderness, no paraspinal tenderness  Abdomen: Nondistended, no palpable masses, surgical incision sites healed appropriately, nonfocal mild abdominal pain to examination otherwise unremarkable  Musculoskeletal: Full ROM, no edema, no cyanosis, good ROM of major joints  Integument:  Warm, Dry, No erythema, No rash.    Neurologic:  Alert & oriented.  No focal deficits appreciated.  Ambulatory.  Psychiatric:  Affect normal, Judgment normal, Mood normal.       LAB:  All pertinent labs reviewed and interpreted.  Results for orders placed or performed during the hospital encounter of 02/21/25   CT Abdomen Pelvis w Contrast    Impression    IMPRESSION:     1.  Interval cholecystectomy without CT findings of postoperative complications.    2.  Small to moderate amount of stool in the rectal wall with subtle rectal wall thickening and mild perirectal fat stranding, possibly secondary to rectal stool impaction.   Comprehensive metabolic panel   Result Value Ref Range    Sodium 135 135 - 145 mmol/L    Potassium 4.1 3.4 - 5.3 mmol/L    Carbon Dioxide (CO2) 22 22 - 29 mmol/L    Anion Gap 15 7 - 15 mmol/L    Urea Nitrogen 12.4 6.0 - 20.0 mg/dL    Creatinine 0.65 0.51 - 0.95 mg/dL    GFR Estimate >90 >60 mL/min/1.73m2    Calcium 9.4 8.8 - 10.4 mg/dL    Chloride 98 98 - 107 mmol/L    Glucose 345 (H) 70 - 99 mg/dL    Alkaline Phosphatase 78 40 - 150 U/L    AST 12 0 - 45 U/L    ALT 16 0 - 50 U/L    Protein Total 7.8 6.4 - 8.3 g/dL    Albumin 4.4 3.5 - 5.2 g/dL    Bilirubin Total 1.1  <=1.2 mg/dL   Result Value Ref Range    Lipase 25 13 - 60 U/L   CBC with platelets and differential   Result Value Ref Range    WBC Count 14.5 (H) 4.0 - 11.0 10e3/uL    RBC Count 4.85 3.80 - 5.20 10e6/uL    Hemoglobin 13.5 11.7 - 15.7 g/dL    Hematocrit 41.3 35.0 - 47.0 %    MCV 85 78 - 100 fL    MCH 27.8 26.5 - 33.0 pg    MCHC 32.7 31.5 - 36.5 g/dL    RDW 11.7 10.0 - 15.0 %    Platelet Count 295 150 - 450 10e3/uL    % Neutrophils 81 %    % Lymphocytes 13 %    % Monocytes 4 %    % Eosinophils 1 %    % Basophils 1 %    % Immature Granulocytes 1 %    NRBCs per 100 WBC 0 <1 /100    Absolute Neutrophils 11.7 (H) 1.6 - 8.3 10e3/uL    Absolute Lymphocytes 1.9 0.8 - 5.3 10e3/uL    Absolute Monocytes 0.6 0.0 - 1.3 10e3/uL    Absolute Eosinophils 0.1 0.0 - 0.7 10e3/uL    Absolute Basophils 0.1 0.0 - 0.2 10e3/uL    Absolute Immature Granulocytes 0.1 <=0.4 10e3/uL    Absolute NRBCs 0.0 10e3/uL     RADIOLOGY:  Reviewed all pertinent imaging. Please see official radiology report.  CT Abdomen Pelvis w Contrast   Final Result   IMPRESSION:       1.  Interval cholecystectomy without CT findings of postoperative complications.      2.  Small to moderate amount of stool in the rectal wall with subtle rectal wall thickening and mild perirectal fat stranding, possibly secondary to rectal stool impaction.          I, Radha Stanley, am serving as a scribe to document services personally performed by Jesse Gallardo MD based on my observation and the provider's statements to me. I, Jesse Gallardo MD, attest that Radha Stanley is acting in a scribe capacity, has observed my performance of the services and has documented them in accordance with my direction.    Jesse Gallardo MD  [facility] EMERGENCY DEPARTMENT  [address] [place] [id number]  [phone]     Jesse Gallardo MD  02/21/25 0894

## 2025-02-28 ENCOUNTER — APPOINTMENT (OUTPATIENT)
Dept: CT IMAGING | Facility: HOSPITAL | Age: 40
End: 2025-02-28
Payer: COMMERCIAL

## 2025-02-28 ENCOUNTER — HOSPITAL ENCOUNTER (EMERGENCY)
Facility: HOSPITAL | Age: 40
Discharge: HOME | End: 2025-02-28
Payer: COMMERCIAL

## 2025-02-28 ENCOUNTER — NURSE TRIAGE (OUTPATIENT)
Dept: FAMILY MEDICINE | Facility: CLINIC | Age: 40
End: 2025-02-28

## 2025-02-28 VITALS
WEIGHT: 209 LBS | HEART RATE: 89 BPM | OXYGEN SATURATION: 100 % | BODY MASS INDEX: 33.59 KG/M2 | DIASTOLIC BLOOD PRESSURE: 101 MMHG | SYSTOLIC BLOOD PRESSURE: 144 MMHG | TEMPERATURE: 97.4 F | RESPIRATION RATE: 16 BRPM | HEIGHT: 66 IN

## 2025-02-28 DIAGNOSIS — R10.12 ABDOMINAL PAIN, LEFT UPPER QUADRANT: ICD-10-CM

## 2025-02-28 LAB
ALBUMIN SERPL BCG-MCNC: 4.5 G/DL (ref 3.5–5.2)
ALBUMIN UR-MCNC: 30 MG/DL
ALP SERPL-CCNC: 73 U/L (ref 40–150)
ALT SERPL W P-5'-P-CCNC: 19 U/L (ref 0–50)
ANION GAP SERPL CALCULATED.3IONS-SCNC: 13 MMOL/L (ref 7–15)
APPEARANCE UR: ABNORMAL
AST SERPL W P-5'-P-CCNC: 14 U/L (ref 0–45)
BASOPHILS # BLD AUTO: 0.1 10E3/UL (ref 0–0.2)
BASOPHILS NFR BLD AUTO: 1 %
BILIRUB DIRECT SERPL-MCNC: 0.27 MG/DL (ref 0–0.3)
BILIRUB SERPL-MCNC: 1.1 MG/DL
BILIRUB UR QL STRIP: ABNORMAL
BUN SERPL-MCNC: 13.3 MG/DL (ref 6–20)
CALCIUM SERPL-MCNC: 10.2 MG/DL (ref 8.8–10.4)
CHLORIDE SERPL-SCNC: 96 MMOL/L (ref 98–107)
COLOR UR AUTO: YELLOW
CREAT SERPL-MCNC: 0.77 MG/DL (ref 0.51–0.95)
EGFRCR SERPLBLD CKD-EPI 2021: >90 ML/MIN/1.73M2
EOSINOPHIL # BLD AUTO: 0.1 10E3/UL (ref 0–0.7)
EOSINOPHIL NFR BLD AUTO: 1 %
ERYTHROCYTE [DISTWIDTH] IN BLOOD BY AUTOMATED COUNT: 11.8 % (ref 10–15)
GLUCOSE SERPL-MCNC: 238 MG/DL (ref 70–99)
GLUCOSE UR STRIP-MCNC: NEGATIVE MG/DL
HCG UR QL: NEGATIVE
HCO3 SERPL-SCNC: 26 MMOL/L (ref 22–29)
HCT VFR BLD AUTO: 41.9 % (ref 35–47)
HGB BLD-MCNC: 13.9 G/DL (ref 11.7–15.7)
HGB UR QL STRIP: ABNORMAL
IMM GRANULOCYTES # BLD: 0 10E3/UL
IMM GRANULOCYTES NFR BLD: 0 %
KETONES UR STRIP-MCNC: NEGATIVE MG/DL
LEUKOCYTE ESTERASE UR QL STRIP: NEGATIVE
LIPASE SERPL-CCNC: 32 U/L (ref 13–60)
LYMPHOCYTES # BLD AUTO: 3.7 10E3/UL (ref 0.8–5.3)
LYMPHOCYTES NFR BLD AUTO: 34 %
MCH RBC QN AUTO: 28 PG (ref 26.5–33)
MCHC RBC AUTO-ENTMCNC: 33.2 G/DL (ref 31.5–36.5)
MCV RBC AUTO: 84 FL (ref 78–100)
MONOCYTES # BLD AUTO: 0.6 10E3/UL (ref 0–1.3)
MONOCYTES NFR BLD AUTO: 6 %
NEUTROPHILS # BLD AUTO: 6.4 10E3/UL (ref 1.6–8.3)
NEUTROPHILS NFR BLD AUTO: 58 %
NITRATE UR QL: NEGATIVE
NRBC # BLD AUTO: 0 10E3/UL
NRBC BLD AUTO-RTO: 0 /100
PH UR STRIP: 6 [PH] (ref 5–7)
PLATELET # BLD AUTO: 333 10E3/UL (ref 150–450)
POTASSIUM SERPL-SCNC: 3.6 MMOL/L (ref 3.4–5.3)
PROT SERPL-MCNC: 8.2 G/DL (ref 6.4–8.3)
RBC # BLD AUTO: 4.97 10E6/UL (ref 3.8–5.2)
RBC URINE: ABNORMAL
SODIUM SERPL-SCNC: 135 MMOL/L (ref 135–145)
SP GR UR STRIP: 1.03 (ref 1–1.03)
UROBILINOGEN UR STRIP-MCNC: NORMAL MG/DL
WBC # BLD AUTO: 11 10E3/UL (ref 4–11)
WBC URINE: ABNORMAL

## 2025-02-28 PROCEDURE — 85004 AUTOMATED DIFF WBC COUNT: CPT

## 2025-02-28 PROCEDURE — 81003 URINALYSIS AUTO W/O SCOPE: CPT

## 2025-02-28 PROCEDURE — 250N000011 HC RX IP 250 OP 636

## 2025-02-28 PROCEDURE — 83690 ASSAY OF LIPASE: CPT

## 2025-02-28 PROCEDURE — 80048 BASIC METABOLIC PNL TOTAL CA: CPT

## 2025-02-28 PROCEDURE — 96375 TX/PRO/DX INJ NEW DRUG ADDON: CPT

## 2025-02-28 PROCEDURE — 36415 COLL VENOUS BLD VENIPUNCTURE: CPT

## 2025-02-28 PROCEDURE — 82248 BILIRUBIN DIRECT: CPT

## 2025-02-28 PROCEDURE — 74177 CT ABD & PELVIS W/CONTRAST: CPT

## 2025-02-28 PROCEDURE — 99285 EMERGENCY DEPT VISIT HI MDM: CPT | Mod: 25

## 2025-02-28 PROCEDURE — 81025 URINE PREGNANCY TEST: CPT

## 2025-02-28 PROCEDURE — 96374 THER/PROPH/DIAG INJ IV PUSH: CPT | Mod: 59

## 2025-02-28 PROCEDURE — 82374 ASSAY BLOOD CARBON DIOXIDE: CPT

## 2025-02-28 RX ORDER — ONDANSETRON 2 MG/ML
4 INJECTION INTRAMUSCULAR; INTRAVENOUS ONCE
Status: COMPLETED | OUTPATIENT
Start: 2025-02-28 | End: 2025-02-28

## 2025-02-28 RX ORDER — KETOROLAC TROMETHAMINE 15 MG/ML
15 INJECTION, SOLUTION INTRAMUSCULAR; INTRAVENOUS ONCE
Status: COMPLETED | OUTPATIENT
Start: 2025-02-28 | End: 2025-02-28

## 2025-02-28 RX ORDER — SUCRALFATE ORAL 1 G/10ML
1 SUSPENSION ORAL 4 TIMES DAILY PRN
Qty: 473 ML | Refills: 0 | Status: SHIPPED | OUTPATIENT
Start: 2025-02-28

## 2025-02-28 RX ORDER — IOPAMIDOL 755 MG/ML
90 INJECTION, SOLUTION INTRAVASCULAR ONCE
Status: COMPLETED | OUTPATIENT
Start: 2025-02-28 | End: 2025-02-28

## 2025-02-28 RX ORDER — ONDANSETRON 4 MG/1
4 TABLET, ORALLY DISINTEGRATING ORAL EVERY 8 HOURS PRN
Qty: 20 TABLET | Refills: 0 | Status: SHIPPED | OUTPATIENT
Start: 2025-02-28

## 2025-02-28 RX ORDER — FAMOTIDINE 20 MG/1
20 TABLET, FILM COATED ORAL 2 TIMES DAILY PRN
Qty: 30 TABLET | Refills: 0 | Status: SHIPPED | OUTPATIENT
Start: 2025-02-28

## 2025-02-28 RX ADMIN — ONDANSETRON 4 MG: 2 INJECTION, SOLUTION INTRAMUSCULAR; INTRAVENOUS at 16:11

## 2025-02-28 RX ADMIN — KETOROLAC TROMETHAMINE 15 MG: 15 INJECTION, SOLUTION INTRAMUSCULAR; INTRAVENOUS at 16:06

## 2025-02-28 RX ADMIN — IOPAMIDOL 90 ML: 755 INJECTION, SOLUTION INTRAVENOUS at 18:48

## 2025-02-28 ASSESSMENT — ACTIVITIES OF DAILY LIVING (ADL)
ADLS_ACUITY_SCORE: 41

## 2025-02-28 ASSESSMENT — COLUMBIA-SUICIDE SEVERITY RATING SCALE - C-SSRS
1. IN THE PAST MONTH, HAVE YOU WISHED YOU WERE DEAD OR WISHED YOU COULD GO TO SLEEP AND NOT WAKE UP?: NO
6. HAVE YOU EVER DONE ANYTHING, STARTED TO DO ANYTHING, OR PREPARED TO DO ANYTHING TO END YOUR LIFE?: NO
2. HAVE YOU ACTUALLY HAD ANY THOUGHTS OF KILLING YOURSELF IN THE PAST MONTH?: NO

## 2025-02-28 NOTE — TELEPHONE ENCOUNTER
Nurse Triage SBAR    Is this a 2nd Level Triage? YES, LICENSED PRACTITIONER REVIEW IS REQUIRED    Situation: Patient with sudden onset of moderate to severe LUQ abdominal pain.    Background: Cholecystectomy 1/29. Seen in ER 2/21 for constipation which is improved. History DM II    Assessment: LUQ pain, 7-8/10 when present. Patient reports a sharp, pulsating pain that began approximately 30 min ago. Patient nauseous. LBM yesterday which was a typical BM for patient.  Denies chest pain, SOB/difficulty breathing, fever.    Protocol Recommended Disposition:   Go to ED Now    Recommendation: patient agreeable and plans to go to [facility] ED.      Routed to provider    Does the patient meet one of the following criteria for ADS visit consideration? 16+ years old, with an [facility] PCP     TIP  Providers, please consider if this condition is appropriate for management at one of our Acute and Diagnostic Services sites.     If patient is a good candidate, please use dotphrase <dot>triageresponse and select Refer to ADS to document.      Reason for Disposition   Pain lasting > 10 minutes and over 35 years old and at least one cardiac risk factor (e.g., diabetes, high cholesterol, hypertension, obesity, smoker or strong family history of heart disease)    Additional Information   Negative: SEVERE difficulty breathing (e.g., struggling for each breath, speaks in single words)   Negative: Shock suspected (e.g., cold/pale/clammy skin, too weak to stand, low BP, rapid pulse)   Negative: Difficult to awaken or acting confused (e.g., disoriented, slurred speech)   Negative: Passed out (e.g., fainted, lost consciousness, blacked out and was not responding)   Negative: Visible sweat on face or sweat is dripping down   Negative: Sounds like a life-threatening emergency to the triager   Negative: Followed an abdomen (stomach) injury   Negative: Chest pain   Negative: Abdominal pain and pregnant < 20 weeks   Negative: Abdominal pain and  "pregnant 20 or more weeks   Negative: Abdomen bloating or swelling are main symptoms   Negative: Pain lasting > 10 minutes and over 40 years old and associated chest, arm, neck, upper back, or jaw pain   Negative: SEVERE abdominal pain (e.g., excruciating)   Negative: Pain lasting > 10 minutes and over 50 years old    Answer Assessment - Initial Assessment Questions  1. LOCATION: \"Where does it hurt?\"       LUQ  2. RADIATION: \"Does the pain shoot anywhere else?\" (e.g., chest, back)      No   3. ONSET: \"When did the pain begin?\" (e.g., minutes, hours or days ago)       Approximately 30 minutes  4. SUDDEN: \"Gradual or sudden onset?\"      sudden  5. PATTERN \"Does the pain come and go, or is it constant?\"      Intermittent, pulsating  6. SEVERITY: \"How bad is the pain?\"  (e.g., Scale 1-10; mild, moderate, or severe)      When present, 7-8/10; 0/10 when not present  7. RECURRENT SYMPTOM: \"Have you ever had this type of stomach pain before?\" If Yes, ask: \"When was the last time?\" and \"What happened that time?\"       no  8. AGGRAVATING FACTORS: \"Does anything seem to cause this pain?\" (e.g., foods, stress, alcohol)      Movement makes worse  9. CARDIAC SYMPTOMS: \"Do you have any of the following symptoms: chest pain, difficulty breathing, sweating, nausea?\"      nausea  10. OTHER SYMPTOMS: \"Do you have any other symptoms?\" (e.g., back pain, diarrhea, fever, urination pain, vomiting)        No   11. PREGNANCY: \"Is there any chance you are pregnant?\" \"When was your last menstrual period?\"        no    Protocols used: Abdominal Pain - Upper-A-[place]    "

## 2025-02-28 NOTE — ED TRIAGE NOTES
Patient arrives to ED for evaluation of LUQ abdominal pain that began approximately 1300 today. Rates pain 7/10, waxing and waning. Also endorses nausea and vomiting. History of lap cherrie 1 month ago and states the pain is deep under one of the lap sites.     Triage Assessment (Adult)       Row Name 02/28/25 1459          Triage Assessment    Airway WDL WDL        Respiratory WDL    Respiratory WDL WDL        Cardiac WDL    Cardiac WDL WDL        Cognitive/Neuro/Behavioral WDL    Cognitive/Neuro/Behavioral WDL WDL

## 2025-03-01 NOTE — DISCHARGE INSTRUCTIONS
You were seen in the emergency department for pain in your left upper abdomen.  Your workup here was overall reassuring without any evidence of surgical complications, infections, kidney stones, internal bleeding, etc..  I do think it is possible that your pain is gastric in nature AKA the stomach being inflammed given that it was after eating [employer].  Will recommend a few things that you can do at home for your pain.  Zofran as needed for nausea and vomiting.  Tylenol 1000 mg every 6 hours as needed for pain.  You can also take famotidine twice daily before your largest meals of the day which helps with stomach inflammation and increased acid. There is also a medication called carafate which is a liquid you can drink multiple times per day which coats the stomach. Follow back up with your surgeon as needed.  Return to the ER if you develop any new or worsening symptoms.

## 2025-03-01 NOTE — ED PROVIDER NOTES
"EMERGENCY DEPARTMENT ENCOUNTER      NAME: Jojo Mccallum  AGE: 39 year old female  YOB: 1985  MRN: [id number]  EVALUATION DATE & TIME: 2/28/2025  3:55 PM    PCP: [facility]ak Laws    ED PROVIDER: [place] Asif PA-C    CHIEF COMPLAINT  Abdominal Pain      FINAL IMPRESSION:      ICD-10-CM    1. Abdominal pain, left upper quadrant  R10.12           MEDICAL DECISION MAKING AND ED COURSE:  Pertinent Labs & Imaging studies reviewed (See chart for details)  Jojo Mccallum is a 39 year old female with a PMH of DM II and 4 weeks s/p cholecystectomy who presents to the ER with sudden onset of left upper quadrant pain with nausea and vomiting.   Started about 1-shabbir hour after eating chipotle at around noon today and has been intermittent since. Reports it feels similar to her \"gall bladder pain\". Denies chest pain, cough, URI sx, urinary symptoms, SOB, leg swelling. Vitals reviewed. Tachycardic at 103. Afebrile. No hypoxia and tachypnea. On exam, uncomfortable appearing but in no distress. Epigastric and RUQ tenderness. No CVA tenderness. Cardiopulmonary exam unremarkable.     Urine without signs of infection, blood but is on menstrual cycle. Negative pregnancy  lipase and LFTs wnl. No leukocytosis or anemia. CT abdomen pelvis without any acute findings.  Overall lab work appears very reassuring.  I do think her pain could be from eating the Chipotle either some sort of gastritis/GERD.  But nontoxic-appearing with reassuring vitals.  Discussed Carafate, Pepcid, Zofran, and Tylenol for pain and follow back up with surgeon.  Strict return precautions were discussed.  Discharged in stable condition.  All questions answered.      Medical Decision Making  Obtained supplemental history:Supplemental history obtained?: No  Reviewed external records: External records reviewed?: Documented in chart and Outpatient Record: ER visit from 2/21/25  Care impacted by chronic illness:Documented in Chart  Care significantly " affected by social determinants of health:N/A  Did you consider but not order tests?: Work up considered but not performed and documented in chart, if applicable  Did you interpret images independently?: Independent interpretation of ECG and images noted in documentation, when applicable.  Consultation discussion with other provider:Did you involve another provider (consultant, , pharmacy, etc.)?: No  Discharge. I prescribed additional prescription strength medication(s) as charted. See documentation for any additional details.        0 minutes of critical care time     MEDICATIONS GIVEN IN THE EMERGENCY:  Medications   ketorolac (TORADOL) injection 15 mg (15 mg Intravenous $Given 2/28/25 1606)   ondansetron (ZOFRAN) injection 4 mg (4 mg Intravenous $Given 2/28/25 1611)   iopamidol (ISOVUE-370) solution 90 mL (90 mLs Intravenous $Given 2/28/25 1848)       NEW PRESCRIPTIONS STARTED AT TODAY'S ER VISIT  Discharge Medication List as of 2/28/2025  7:25 PM        START taking these medications    Details   famotidine (PEPCID) 20 MG tablet Take 1 tablet (20 mg) by mouth 2 times daily as needed (gastritis/GERD)., Disp-30 tablet, R-0, E-Prescribe      !! ondansetron (ZOFRAN ODT) 4 MG ODT tab Take 1 tablet (4 mg) by mouth every 8 hours as needed for nausea or vomiting., Disp-20 tablet, R-0, E-Prescribe      sucralfate (CARAFATE) 1 GM/10ML suspension Take 10 mLs (1 g) by mouth 4 times daily as needed for nausea (gastritis/GERD)., Disp-473 mL, R-0, E-Prescribe       !! - Potential duplicate medications found. Please discuss with provider.        Discharge Medication List as of 2/28/2025  7:25 PM          =================================================================    HPI    Patient information was obtained from: patient   Use of : N/A     Jojo Mccallum is a 39 year old female with a PMH of DM II and 4 weeks s/p cholecystectomy who presents to the ER with sudden onset of left upper quadrant pain with nausea  "and vomiting.  Has had ongoing nausea and vomiting for the past couple days. Was seen in the ER last week and had a CT scan which did not show any acute findings.  Started about 1-shabbir hour after eating chipotle at around noon today and has been intermittent since. Reports it feels similar to her \"gall bladder pain\" and that it is located right under one of her incision sites.. Currently on her menstrual cycle. Denies chest pain, cough, URI sx, urinary symptoms, SOB, leg swelling.     Chart review:  Was seen in the ER on 2/21/25 for abdominal pain and constipation. CT reassuring at that time.     Cholecystectomy 1/29/24    PHYSICAL EXAM    BP (!) 144/101   Pulse 89   Temp 97.4  F (36.3  C) (Oral)   Resp 16   Ht 1.676 m (5' 6\")   Wt 94.8 kg (209 lb)   LMP 12/27/2024 (Exact Date)   SpO2 100%   BMI 33.73 kg/m    Physical Exam  Vitals and nursing note reviewed.   Constitutional:       General: She is not in acute distress.     Appearance: She is not ill-appearing, toxic-appearing or diaphoretic.      Comments: Uncomfortable appearing   HENT:      Head: Normocephalic and atraumatic.      Mouth/Throat:      Mouth: Mucous membranes are moist.   Cardiovascular:      Rate and Rhythm: Normal rate.   Pulmonary:      Effort: Pulmonary effort is normal. No respiratory distress.      Breath sounds: No stridor. No wheezing, rhonchi or rales.   Abdominal:      General: Abdomen is flat. Bowel sounds are normal.      Palpations: Abdomen is soft.      Tenderness: There is abdominal tenderness in the epigastric area and left upper quadrant. There is no right CVA tenderness, left CVA tenderness, guarding or rebound.      Hernia: No hernia is present.   Skin:     General: Skin is warm.      Capillary Refill: Capillary refill takes less than 2 seconds.      Findings: No erythema.      Comments: No erythema surrounding incision sites, no drainage or odor   Neurological:      Mental Status: She is alert and oriented to person, place, " and time.   Psychiatric:         Mood and Affect: Mood is anxious.            LAB:  All pertinent labs reviewed and interpreted.  Results for orders placed or performed during the hospital encounter of 02/28/25   CT Abdomen Pelvis w Contrast    Impression    IMPRESSION:   1.  No acute findings or inflammatory changes in the abdomen or pelvis.   Basic metabolic panel   Result Value Ref Range    Sodium 135 135 - 145 mmol/L    Potassium 3.6 3.4 - 5.3 mmol/L    Chloride 96 (L) 98 - 107 mmol/L    Carbon Dioxide (CO2) 26 22 - 29 mmol/L    Anion Gap 13 7 - 15 mmol/L    Urea Nitrogen 13.3 6.0 - 20.0 mg/dL    Creatinine 0.77 0.51 - 0.95 mg/dL    GFR Estimate >90 >60 mL/min/1.73m2    Calcium 10.2 8.8 - 10.4 mg/dL    Glucose 238 (H) 70 - 99 mg/dL   Hepatic function panel   Result Value Ref Range    Protein Total 8.2 6.4 - 8.3 g/dL    Albumin 4.5 3.5 - 5.2 g/dL    Bilirubin Total 1.1 <=1.2 mg/dL    Alkaline Phosphatase 73 40 - 150 U/L    AST 14 0 - 45 U/L    ALT 19 0 - 50 U/L    Bilirubin Direct 0.27 0.00 - 0.30 mg/dL   Result Value Ref Range    Lipase 32 13 - 60 U/L   CBC with platelets and differential   Result Value Ref Range    WBC Count 11.0 4.0 - 11.0 10e3/uL    RBC Count 4.97 3.80 - 5.20 10e6/uL    Hemoglobin 13.9 11.7 - 15.7 g/dL    Hematocrit 41.9 35.0 - 47.0 %    MCV 84 78 - 100 fL    MCH 28.0 26.5 - 33.0 pg    MCHC 33.2 31.5 - 36.5 g/dL    RDW 11.8 10.0 - 15.0 %    Platelet Count 333 150 - 450 10e3/uL    % Neutrophils 58 %    % Lymphocytes 34 %    % Monocytes 6 %    % Eosinophils 1 %    % Basophils 1 %    % Immature Granulocytes 0 %    NRBCs per 100 WBC 0 <1 /100    Absolute Neutrophils 6.4 1.6 - 8.3 10e3/uL    Absolute Lymphocytes 3.7 0.8 - 5.3 10e3/uL    Absolute Monocytes 0.6 0.0 - 1.3 10e3/uL    Absolute Eosinophils 0.1 0.0 - 0.7 10e3/uL    Absolute Basophils 0.1 0.0 - 0.2 10e3/uL    Absolute Immature Granulocytes 0.0 <=0.4 10e3/uL    Absolute NRBCs 0.0 10e3/uL   UA with Microscopic reflex to Culture    Specimen:  Urine, Clean Catch   Result Value Ref Range    Color Urine Yellow Colorless, Straw, Light Yellow, Yellow    Appearance Urine Slightly Cloudy (A) Clear    Glucose Urine Negative Negative mg/dL    Bilirubin Urine Small (A) Negative    Ketones Urine Negative Negative mg/dL    Specific Gravity Urine 1.030 1.003 - 1.035    Blood Urine Moderate (A) Negative    pH Urine 6.0 5.0 - 7.0    Protein Albumin Urine 30 (A) Negative mg/dL    Urobilinogen Urine Normal Normal, 2.0 mg/dL    Nitrite Urine Negative Negative    Leukocyte Esterase Urine Negative Negative    RBC Urine      WBC Urine     HCG qualitative urine (UPT)   Result Value Ref Range    hCG Urine Qualitative Negative Negative       RADIOLOGY:  Reviewed all pertinent imaging. Please see official radiology report.  CT Abdomen Pelvis w Contrast   Final Result   IMPRESSION:    1.  No acute findings or inflammatory changes in the abdomen or pelvis.          [place] Asif PA-C  [facility] EMERGENCY DEPARTMENT  [facility] [facility] [address]  [phone]        [place] Asif PA-C  03/04/25 [id number]

## 2025-03-18 ENCOUNTER — TELEPHONE (OUTPATIENT)
Dept: ENDOCRINOLOGY | Facility: CLINIC | Age: 40
End: 2025-03-18
Payer: COMMERCIAL

## 2025-03-19 NOTE — TELEPHONE ENCOUNTER
Left Voicemail (2nd Attempt) for the patient to call back and schedule the following:     Appointment type: NEW DIABETES  Provider: Casandra Hull PA-C  Return date: Nadege 3/28/25  Specialty phone number: [phone]  Additional appointment(s) needed: N/A  Additonal Notes:  LVMx2, MyCx1, Due to changes in the providers schedule appt on 3/28 needs to get rescheduled.  Option listed below in hold slots or first available with Kurtis.  Hold Slots and AVILA Ok'd by Betzaida.  This appt is now canceled.     Examples:  5/23 Tanisha Anderson  In Person @ [place] Location  (Hold Slot)     Please note that the above appointment(s) will require manual scheduling as they are marked as AVILA and will not appear using auto search. Do not schedule the patient if another patient has already been scheduled in the requested appointment slot.    David Gallagher on 3/19/2025 at 10:06 AM

## 2025-03-28 ENCOUNTER — PRE VISIT (OUTPATIENT)
Dept: ENDOCRINOLOGY | Facility: CLINIC | Age: 40
End: 2025-03-28

## 2025-04-15 ENCOUNTER — OFFICE VISIT (OUTPATIENT)
Dept: FAMILY MEDICINE | Facility: CLINIC | Age: 40
End: 2025-04-15
Payer: COMMERCIAL

## 2025-04-15 VITALS
SYSTOLIC BLOOD PRESSURE: 134 MMHG | BODY MASS INDEX: 34.36 KG/M2 | HEIGHT: 66 IN | DIASTOLIC BLOOD PRESSURE: 82 MMHG | HEART RATE: 92 BPM | OXYGEN SATURATION: 96 % | WEIGHT: 213.8 LBS | TEMPERATURE: 97.4 F | RESPIRATION RATE: 14 BRPM

## 2025-04-15 DIAGNOSIS — E11.65 TYPE 2 DIABETES MELLITUS WITH HYPERGLYCEMIA, WITHOUT LONG-TERM CURRENT USE OF INSULIN (H): Primary | ICD-10-CM

## 2025-04-15 DIAGNOSIS — E11.69 HYPERLIPIDEMIA DUE TO TYPE 2 DIABETES MELLITUS (H): ICD-10-CM

## 2025-04-15 DIAGNOSIS — I15.2 HYPERTENSION ASSOCIATED WITH TYPE 2 DIABETES MELLITUS (H): ICD-10-CM

## 2025-04-15 DIAGNOSIS — E11.59 HYPERTENSION ASSOCIATED WITH TYPE 2 DIABETES MELLITUS (H): ICD-10-CM

## 2025-04-15 DIAGNOSIS — R11.0 NAUSEA: ICD-10-CM

## 2025-04-15 DIAGNOSIS — E11.29 MICROALBUMINURIA DUE TO TYPE 2 DIABETES MELLITUS (H): ICD-10-CM

## 2025-04-15 DIAGNOSIS — E78.5 HYPERLIPIDEMIA DUE TO TYPE 2 DIABETES MELLITUS (H): ICD-10-CM

## 2025-04-15 DIAGNOSIS — R80.9 MICROALBUMINURIA DUE TO TYPE 2 DIABETES MELLITUS (H): ICD-10-CM

## 2025-04-15 PROBLEM — E66.01 MORBID OBESITY (H): Status: RESOLVED | Noted: 2020-02-10 | Resolved: 2025-04-15

## 2025-04-15 LAB
ALBUMIN SERPL BCG-MCNC: 4.6 G/DL (ref 3.5–5.2)
ALP SERPL-CCNC: 83 U/L (ref 40–150)
ALT SERPL W P-5'-P-CCNC: 16 U/L (ref 0–50)
ANION GAP SERPL CALCULATED.3IONS-SCNC: 12 MMOL/L (ref 7–15)
AST SERPL W P-5'-P-CCNC: 16 U/L (ref 0–45)
BILIRUB SERPL-MCNC: 0.8 MG/DL
BUN SERPL-MCNC: 13.3 MG/DL (ref 6–20)
CALCIUM SERPL-MCNC: 9.9 MG/DL (ref 8.8–10.4)
CHLORIDE SERPL-SCNC: 103 MMOL/L (ref 98–107)
CHOLEST SERPL-MCNC: 195 MG/DL
CREAT SERPL-MCNC: 0.63 MG/DL (ref 0.51–0.95)
EGFRCR SERPLBLD CKD-EPI 2021: >90 ML/MIN/1.73M2
ERYTHROCYTE [DISTWIDTH] IN BLOOD BY AUTOMATED COUNT: 11.8 % (ref 10–15)
EST. AVERAGE GLUCOSE BLD GHB EST-MCNC: 212 MG/DL
GLUCOSE SERPL-MCNC: 267 MG/DL (ref 70–99)
HBA1C MFR BLD: 9 % (ref 0–5.6)
HCO3 SERPL-SCNC: 22 MMOL/L (ref 22–29)
HCT VFR BLD AUTO: 43.7 % (ref 35–47)
HDLC SERPL-MCNC: 45 MG/DL
HGB BLD-MCNC: 14.2 G/DL (ref 11.7–15.7)
HOLD SPECIMEN: NORMAL
LDLC SERPL CALC-MCNC: 120 MG/DL
MCH RBC QN AUTO: 27.9 PG (ref 26.5–33)
MCHC RBC AUTO-ENTMCNC: 32.5 G/DL (ref 31.5–36.5)
MCV RBC AUTO: 86 FL (ref 78–100)
NONHDLC SERPL-MCNC: 150 MG/DL
PLATELET # BLD AUTO: 295 10E3/UL (ref 150–450)
POTASSIUM SERPL-SCNC: 4.2 MMOL/L (ref 3.4–5.3)
PROT SERPL-MCNC: 8.2 G/DL (ref 6.4–8.3)
RBC # BLD AUTO: 5.09 10E6/UL (ref 3.8–5.2)
SODIUM SERPL-SCNC: 137 MMOL/L (ref 135–145)
TRIGL SERPL-MCNC: 148 MG/DL
WBC # BLD AUTO: 11 10E3/UL (ref 4–11)

## 2025-04-15 PROCEDURE — 85027 COMPLETE CBC AUTOMATED: CPT | Performed by: FAMILY MEDICINE

## 2025-04-15 PROCEDURE — 3079F DIAST BP 80-89 MM HG: CPT | Performed by: FAMILY MEDICINE

## 2025-04-15 PROCEDURE — 99214 OFFICE O/P EST MOD 30 MIN: CPT | Mod: 24 | Performed by: FAMILY MEDICINE

## 2025-04-15 PROCEDURE — G2211 COMPLEX E/M VISIT ADD ON: HCPCS | Performed by: FAMILY MEDICINE

## 2025-04-15 PROCEDURE — 80061 LIPID PANEL: CPT | Performed by: FAMILY MEDICINE

## 2025-04-15 PROCEDURE — 83036 HEMOGLOBIN GLYCOSYLATED A1C: CPT | Performed by: FAMILY MEDICINE

## 2025-04-15 PROCEDURE — 3075F SYST BP GE 130 - 139MM HG: CPT | Performed by: FAMILY MEDICINE

## 2025-04-15 PROCEDURE — 80053 COMPREHEN METABOLIC PANEL: CPT | Performed by: FAMILY MEDICINE

## 2025-04-15 PROCEDURE — 36415 COLL VENOUS BLD VENIPUNCTURE: CPT | Performed by: FAMILY MEDICINE

## 2025-04-15 PROCEDURE — 1126F AMNT PAIN NOTED NONE PRSNT: CPT | Performed by: FAMILY MEDICINE

## 2025-04-15 RX ORDER — ONDANSETRON 4 MG/1
4 TABLET, ORALLY DISINTEGRATING ORAL EVERY 8 HOURS PRN
Qty: 90 TABLET | Refills: 1 | Status: SHIPPED | OUTPATIENT
Start: 2025-04-15

## 2025-04-15 ASSESSMENT — PAIN SCALES - GENERAL: PAINLEVEL_OUTOF10: NO PAIN (0)

## 2025-04-15 NOTE — PROGRESS NOTES
Assessment/Plan:    Type 2 diabetes mellitus with hyperglycemia, without long-term current use of insulin (H)  Type 2 diabetes relatively stable at 9% having improved from 12% in recent past.  No longer on Lantus following recent surgery.  Utilizing metformin  mg but only tolerating 1 tablet daily.  Has titrated Ozempic recently from half milligram to 1 mg weekly and just started last week.  Trying to do it slow in order to avoid nausea or constipation concerns.  This happens typically a couple days after injection which she receives on Wednesdays.  Patient will continue 1 mg weekly dose x 4 weeks minimum then titrate further to 2 mg weekly following.  Recent microalbumin screen abnormal November 5, 2024 with ratio 37.54.  Continues lisinopril 5 mg daily for renal protective benefits.  - Hemoglobin A1c  - Hemoglobin A1c  - Extra Tube  - Extra Tube  - HEPATITIS B, ADULT 20+ (ENGERIX-B/RECOMBIVAX HB)  - Extra Tube  - Extra Tube  - Comprehensive metabolic panel  - Comprehensive metabolic panel    Nausea  Refill on ondansetron 4 mg ODT.  Omeprazole 20 mg daily recommended x 30 to 90 days to determine if further benefits while on PPI.  - Comprehensive metabolic panel  - CBC with platelets  - ondansetron (ZOFRAN ODT) 4 MG ODT tab  Dispense: 90 tablet; Refill: 1  - Comprehensive metabolic panel  - CBC with platelets    Microalbuminuria due to type 2 diabetes mellitus (H)  Lisinopril 5 mg daily continuing.    Hypertension associated with type 2 diabetes mellitus (H)  Hypertension appears well-controlled currently.  Lisinopril 5 mg daily.  - Comprehensive metabolic panel  - Comprehensive metabolic panel    Hyperlipidemia due to type 2 diabetes mellitus (H)  Hyperlipidemia and has restarted rosuvastatin 10 mg daily at prior visit November 5, 2024.  Recent LFTs were normal.  - Lipid panel reflex to direct LDL Fasting  - Lipid panel reflex to direct LDL Fasting               Subjective:    Jojo Mccallum is seen today  "for follow-up assessment.  Type 2 diabetes.  Is on Ozempic which just increased from half milligram up to 1 mg weekly.  Prior A1c is improving from 12% 8.9% 2025.  Does have some nausea and constipation a couple days after her Ozempic injections typically received on Wednesday.  Has made adjustments.  Needs refill on ondansetron and would like to have more available.  Uses freestyle tyron 3 device.  Had been on Lantus perioperatively previously but no longer utilizing.  Lisinopril 5 mg daily for hypertension microalbuminuria history.  Rosuvastatin 10 mg at bedtime for lipid management.  Comprehensive review of systems as above otherwise all negative.      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)   Hobbies:  video games; art projects, reading (historical fiction)         Answers submitted by the patient for this visit:  Diabetes Visit (Submitted on 4/15/2025)  Chief Complaint: Chronic problems general questions HPI Form  Frequency of checking blood sugars:: continous glucose monitor  What time of day are you checking your blood sugars : before and after meals, at bedtime  Have you had any blood sugars above 200?: Yes  Have you had any blood sugars below 70?: No  Hypoglycemia symptoms:: shakiness  Diabetic concerns:: none  Paraesthesia present:: numbness in feet  General Questionnaire (Submitted on 4/15/2025)  Chief Complaint: Chronic problems general questions HPI Form  How many servings of fruits and vegetables do you eat daily?: 2-3  On average, how many sweetened beverages do you drink each day (Examples: soda, juice, sweet tea, etc.  Do NOT count diet or artificially sweetened beverages)?: 2  How many " minutes a day do you exercise enough to make your heart beat faster?: 9 or less  How many days a week do you exercise enough to make your heart beat faster?: 3 or less  How many days per week do you miss taking your medication?: 5  What makes it hard for you to take your medication every day?: side effects, remembering to take  Questionnaire about: Chronic problems general questions HPI Form (Submitted on 4/15/2025)  Chief Complaint: Chronic problems general questions HPI Form        Past Surgical History:   Procedure Laterality Date    CATARACT EXTRACTION       SECTION      DAVINCI LAPAROSCOPIC CHOLECYSTECTOMY WITHOUT GRAMS N/A 2025    Procedure: CHOLECYSTECTOMY, ROBOT-ASSISTED, LAPAROSCOPIC;  Surgeon: Andrew Buenrostro MD;  Location: [facility] OR    LASER YAG CAPSULOTOMY          Family History   Problem Relation Age of Onset    Diabetes Mother     Obesity Mother     Kidney Disease Father     Dialysis Father     Obesity Brother     Obesity Brother     Diabetes Maternal Grandmother     Cerebrovascular Disease Maternal Grandmother         stroke    Anesthesia Reaction No family hx of     Deep Vein Thrombosis (DVT) No family hx of         Past Medical History:   Diagnosis Date    Biliary colic     Depressive disorder     Diabetes (H)     Nexplanon in place 2021    Left    Obesity     PCOS (polycystic ovarian syndrome)         Social History     Tobacco Use    Smoking status: Former     Current packs/day: 0.10     Types: Cigarettes     Passive exposure: Current    Smokeless tobacco: Never   Substance Use Topics    Alcohol use: Not Currently    Drug use: Yes     Types: Marijuana     Comment: smoking every other day and edibles once a month        Current Outpatient Medications   Medication Sig Dispense Refill    Continuous Glucose Sensor (FREESTYLE HOLLIE 3 SENSOR) MISC Change every 14 days. 6 each 1    cyclopentolate (CYCLOGYL) 1 % ophthalmic solution INSTILL 1 DROP IN BOTH EYES TWICE DAILY       "etonogestrel (NEXPLANON) 68 MG IMPL 1 each by Subdermal route once      famotidine (PEPCID) 20 MG tablet Take 1 tablet (20 mg) by mouth 2 times daily as needed (gastritis/GERD). 30 tablet 0    Insulin Pen Needle (PEN NEEDLES) 32G X 4 MM MISC 1 each daily. 100 each 4    lisinopril (ZESTRIL) 5 MG tablet Take 1 tablet (5 mg) by mouth daily. 90 tablet 1    metFORMIN (GLUCOPHAGE XR) 500 MG 24 hr tablet Take 4 tablets (2,000 mg) by mouth daily (with dinner). 360 tablet 3    ondansetron (ZOFRAN ODT) 4 MG ODT tab Take 1 tablet (4 mg) by mouth every 8 hours as needed for nausea. 90 tablet 1    prednisoLONE acetate (PRED FORTE) 1 % ophthalmic suspension Place 1 drop into both eyes as needed.      rosuvastatin (CRESTOR) 10 MG tablet Take 1 tablet (10 mg) by mouth daily. 90 tablet 3    Semaglutide, 1 MG/DOSE, (OZEMPIC) 4 MG/3ML pen Inject 1 mg subcutaneously every 7 days. 3 mL 0    ondansetron (ZOFRAN ODT) 4 MG ODT tab Take 1 tablet (4 mg) by mouth every 8 hours as needed for nausea or vomiting. (Patient not taking: Reported on 4/15/2025) 20 tablet 0          Objective:    Vitals:    04/15/25 1100   BP: 134/82   BP Location: Right arm   Patient Position: Sitting   Cuff Size: Adult Large   Pulse: 92   Resp: 14   Temp: 97.4  F (36.3  C)   TempSrc: Temporal   SpO2: 96%   Weight: 97 kg (213 lb 12.8 oz)   Height: 1.676 m (5' 5.98\")      Body mass index is 34.52 kg/m .    Alert.  No apparent distress.  Chest clear.  Cardiac exam regular.  Extremities warm and dry.      This note has been dictated using voice recognition software and as a result may contain minor grammatical errors and unintended word substitutions.         "

## 2025-04-17 ENCOUNTER — TELEPHONE (OUTPATIENT)
Dept: FAMILY MEDICINE | Facility: CLINIC | Age: 40
End: 2025-04-17
Payer: COMMERCIAL

## 2025-04-17 DIAGNOSIS — R11.0 NAUSEA: Primary | ICD-10-CM

## 2025-04-17 NOTE — TELEPHONE ENCOUNTER
Prior Authorization Retail Medication Request    Medication/Dose:   Diagnosis and ICD code (if different than what is on RX):    New/renewal/insurance change PA/secondary ins. PA:  Previously Tried and Failed:    Rationale:      Insurance   Primary: [employer] [place]  Insurance ID:  [id number]    Secondary (if applicable):  Insurance ID:      Pharmacy Information (if different than what is on RX)  Name:  [facility]   Phone:  [phone]  Fax:[phone]    Clinic Information  Preferred routing pool for dept communication: [facility]

## 2025-04-22 NOTE — TELEPHONE ENCOUNTER
PA Initiation    Medication: ONDANSETRON 4 MG PO TBDP  Insurance Company: [facility] - Phone [phone] Fax [phone]  Pharmacy Filling the Rx: [facility] - KELSIE SCHROEDER - [address] [facility] [address] AT PORTAL TO [facility]  Filling Pharmacy Phone: [phone]  Filling Pharmacy Fax: [phone]  Start Date: 4/21/2025    OSWALDO IGLESIAS [facility]

## 2025-04-22 NOTE — TELEPHONE ENCOUNTER
PA Initiation    Medication: ONDANSETRON 4 MG PO TBDP  Insurance Company: [employer] - Phone [phone] Fax [phone]  Pharmacy Filling the Rx: [facility] - KELSIE SCHROEDER - [address] [facility] [address] AT PORTAL TO [facility]ED [facility]  Filling Pharmacy Phone: [phone]  Filling Pharmacy Fax: [phone]  Start Date: 4/21/2025

## 2025-04-23 NOTE — TELEPHONE ENCOUNTER
PRIOR AUTHORIZATION DENIED    Medication: ONDANSETRON 4 MG PO TBDP  Insurance Company: [employer] - Phone [phone] Fax [phone]  Denial Date: 4/22/2025  Denial Reason(s):       Appeal Information:       Patient Notified: NO

## 2025-04-24 RX ORDER — ONDANSETRON 4 MG/1
4 TABLET, ORALLY DISINTEGRATING ORAL EVERY 8 HOURS PRN
Qty: 24 TABLET | Refills: 2 | Status: SHIPPED | OUTPATIENT
Start: 2025-04-24

## 2025-05-01 ENCOUNTER — TRANSFERRED RECORDS (OUTPATIENT)
Dept: MULTI SPECIALTY CLINIC | Facility: CLINIC | Age: 40
End: 2025-05-01

## 2025-05-01 LAB — RETINOPATHY: NORMAL

## 2025-06-11 ENCOUNTER — MYC REFILL (OUTPATIENT)
Dept: FAMILY MEDICINE | Facility: CLINIC | Age: 40
End: 2025-06-11
Payer: COMMERCIAL

## 2025-06-11 DIAGNOSIS — E66.01 MORBID OBESITY (H): ICD-10-CM

## 2025-06-11 DIAGNOSIS — E11.65 TYPE 2 DIABETES MELLITUS WITH HYPERGLYCEMIA, WITHOUT LONG-TERM CURRENT USE OF INSULIN (H): ICD-10-CM

## 2025-06-11 RX ORDER — ACYCLOVIR 800 MG/1
TABLET ORAL
Qty: 6 EACH | Refills: 1 | OUTPATIENT
Start: 2025-06-11

## 2025-07-19 ENCOUNTER — HEALTH MAINTENANCE LETTER (OUTPATIENT)
Age: 40
End: 2025-07-19

## 2025-07-29 ENCOUNTER — OFFICE VISIT (OUTPATIENT)
Dept: FAMILY MEDICINE | Facility: CLINIC | Age: 40
End: 2025-07-29
Payer: COMMERCIAL

## 2025-07-29 VITALS
HEART RATE: 111 BPM | TEMPERATURE: 98 F | HEIGHT: 66 IN | BODY MASS INDEX: 33.91 KG/M2 | WEIGHT: 211 LBS | SYSTOLIC BLOOD PRESSURE: 110 MMHG | DIASTOLIC BLOOD PRESSURE: 80 MMHG | OXYGEN SATURATION: 100 %

## 2025-07-29 DIAGNOSIS — I15.2 HYPERTENSION ASSOCIATED WITH TYPE 2 DIABETES MELLITUS (H): ICD-10-CM

## 2025-07-29 DIAGNOSIS — E66.01 MORBID OBESITY (H): ICD-10-CM

## 2025-07-29 DIAGNOSIS — R11.0 NAUSEA: ICD-10-CM

## 2025-07-29 DIAGNOSIS — E11.65 TYPE 2 DIABETES MELLITUS WITH HYPERGLYCEMIA, WITHOUT LONG-TERM CURRENT USE OF INSULIN (H): Primary | ICD-10-CM

## 2025-07-29 DIAGNOSIS — E11.59 HYPERTENSION ASSOCIATED WITH TYPE 2 DIABETES MELLITUS (H): ICD-10-CM

## 2025-07-29 LAB
ANION GAP SERPL CALCULATED.3IONS-SCNC: 11 MMOL/L (ref 7–15)
BUN SERPL-MCNC: 10.6 MG/DL (ref 6–20)
CALCIUM SERPL-MCNC: 10.4 MG/DL (ref 8.8–10.4)
CHLORIDE SERPL-SCNC: 102 MMOL/L (ref 98–107)
CREAT SERPL-MCNC: 0.61 MG/DL (ref 0.51–0.95)
EGFRCR SERPLBLD CKD-EPI 2021: >90 ML/MIN/1.73M2
EST. AVERAGE GLUCOSE BLD GHB EST-MCNC: 189 MG/DL
GLUCOSE SERPL-MCNC: 211 MG/DL (ref 70–99)
HBA1C MFR BLD: 8.2 % (ref 0–5.6)
HCO3 SERPL-SCNC: 24 MMOL/L (ref 22–29)
HOLD SPECIMEN: NORMAL
HOLD SPECIMEN: NORMAL
POTASSIUM SERPL-SCNC: 4.4 MMOL/L (ref 3.4–5.3)
SODIUM SERPL-SCNC: 137 MMOL/L (ref 135–145)

## 2025-07-29 PROCEDURE — 83036 HEMOGLOBIN GLYCOSYLATED A1C: CPT | Performed by: FAMILY MEDICINE

## 2025-07-29 PROCEDURE — 99214 OFFICE O/P EST MOD 30 MIN: CPT | Performed by: FAMILY MEDICINE

## 2025-07-29 PROCEDURE — G2211 COMPLEX E/M VISIT ADD ON: HCPCS | Performed by: FAMILY MEDICINE

## 2025-07-29 PROCEDURE — 36415 COLL VENOUS BLD VENIPUNCTURE: CPT | Performed by: FAMILY MEDICINE

## 2025-07-29 PROCEDURE — 3074F SYST BP LT 130 MM HG: CPT | Performed by: FAMILY MEDICINE

## 2025-07-29 PROCEDURE — 3079F DIAST BP 80-89 MM HG: CPT | Performed by: FAMILY MEDICINE

## 2025-07-29 PROCEDURE — 80048 BASIC METABOLIC PNL TOTAL CA: CPT | Performed by: FAMILY MEDICINE

## 2025-07-29 PROCEDURE — 3052F HG A1C>EQUAL 8.0%<EQUAL 9.0%: CPT | Performed by: FAMILY MEDICINE

## 2025-07-29 RX ORDER — HYDROCHLOROTHIAZIDE 12.5 MG/1
CAPSULE ORAL
COMMUNITY

## 2025-07-29 RX ORDER — ONDANSETRON 4 MG/1
4 TABLET, ORALLY DISINTEGRATING ORAL EVERY 8 HOURS PRN
Qty: 24 TABLET | Refills: 3 | Status: SHIPPED | OUTPATIENT
Start: 2025-07-29

## 2025-07-29 NOTE — PROGRESS NOTES
Assessment/Plan:    Type 2 diabetes mellitus with hyperglycemia, without long-term current use of insulin (H)  Type 2 diabetes reviewed.  A1c improvement from 9% to 8.2%.  Using Ozempic 1 mg weekly currently.  Admits to not utilizing metformin  mg using 1 tablet daily recently.  Patient does agree to try switch to tirzepatide 5 mg weekly in place of Ozempic for additional weight loss benefit anticipated as well as A1c goal less than 7% ideally.  Will reassess at follow-up in 4 months.  - Hemoglobin A1c  - Hemoglobin A1c  - tirzepatide (MOUNJARO) 5 MG/0.5ML SOAJ auto-injector pen  Dispense: 6 mL; Refill: 0  - Basic metabolic panel  - Basic metabolic panel    Hypertension associated with type 2 diabetes mellitus (H)  Utilizing lisinopril 5 mg daily for both hypertension and renal protective benefits with history of microalbuminuria historically.  Recheck at follow-up.  - Basic metabolic panel  - Basic metabolic panel    Nausea  Ondansetron available for as needed use.  - ondansetron (ZOFRAN ODT) 4 MG ODT tab  Dispense: 24 tablet; Refill: 3    Morbid obesity (H)  Severe obesity historically.  Weight max of 246 pounds noted March 11, 2022.  35 pound weight loss since that time.  - tirzepatide (MOUNJARO) 5 MG/0.5ML SOAJ auto-injector pen  Dispense: 6 mL; Refill: 0            Subjective:    Jojo BROWN Alessio is seen today for follow-up assessment.  Type 2 diabetes.  Suboptimal control historically with A1c's between 9 and 12% on average.  Admits to not tolerating metformin well so therefore not utilizing it but has titrated Ozempic currently to 1 mg weekly and does have some nausea and would like refill on ondansetron.  Patient is traveling back for a 2-week road trip to [place] where she is originally from upcoming and wants to wait to make significant medication changes until she returns.  Microalbuminuria historically as well as hypertension treated with lisinopril 5 mg daily.  Rosuvastatin 10 mg daily for  "cholesterol management.  Patient is fasting today.  Fasting lipid cascade reviewed from April 15, 2025 as well.  Denies recent illness.  Comprehensive review of systems as above otherwise all negative.      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)   Hobbies:  video games; art projects, reading (historical fiction)         Past Surgical History:   Procedure Laterality Date    CATARACT EXTRACTION       SECTION      DAVINCI LAPAROSCOPIC CHOLECYSTECTOMY WITHOUT GRAMS N/A 2025    Procedure: CHOLECYSTECTOMY, ROBOT-ASSISTED, LAPAROSCOPIC;  Surgeon: Andrew Buenrostro MD;  Location: [facility] OR    LASER YAG CAPSULOTOMY          Family History   Problem Relation Age of Onset    Diabetes Mother     Obesity Mother     Kidney Disease Father     Dialysis Father     Obesity Brother     Obesity Brother     Diabetes Maternal Grandmother     Cerebrovascular Disease Maternal Grandmother         stroke    Anesthesia Reaction No family hx of     Deep Vein Thrombosis (DVT) No family hx of         Past Medical History:   Diagnosis Date    Biliary colic     Depressive disorder     Diabetes (H)     Nexplanon in place 2021    Left    Obesity     PCOS (polycystic ovarian syndrome)         Social History     Tobacco Use    Smoking status: Former     Current packs/day: 0.10     Types: Cigarettes     Passive exposure: Current    Smokeless tobacco: Never   Substance Use Topics    Alcohol use: Not Currently    Drug use: Yes     Types: Marijuana     Comment: smoking every other day and edibles once a month        Current Outpatient Medications   Medication Sig Dispense Refill    Continuous Glucose Sensor (FREESTYLE HOLLIE 3 PLUS " "SENSOR) MISC Change every 15 days.      cyclopentolate (CYCLOGYL) 1 % ophthalmic solution INSTILL 1 DROP IN BOTH EYES TWICE DAILY      etonogestrel (NEXPLANON) 68 MG IMPL 1 each by Subdermal route once      famotidine (PEPCID) 20 MG tablet Take 1 tablet (20 mg) by mouth 2 times daily as needed (gastritis/GERD). 30 tablet 0    Insulin Pen Needle (PEN NEEDLES) 32G X 4 MM MISC 1 each daily. 100 each 4    lisinopril (ZESTRIL) 5 MG tablet Take 1 tablet (5 mg) by mouth daily. 90 tablet 1    metFORMIN (GLUCOPHAGE XR) 500 MG 24 hr tablet Take 4 tablets (2,000 mg) by mouth daily (with dinner). 360 tablet 3    ondansetron (ZOFRAN ODT) 4 MG ODT tab Take 1 tablet (4 mg) by mouth every 8 hours as needed for nausea or vomiting. 24 tablet 3    ondansetron (ZOFRAN ODT) 4 MG ODT tab Take 1 tablet (4 mg) by mouth every 8 hours as needed for nausea. 90 tablet 1    prednisoLONE acetate (PRED FORTE) 1 % ophthalmic suspension Place 1 drop into both eyes as needed.      rosuvastatin (CRESTOR) 10 MG tablet Take 1 tablet (10 mg) by mouth daily. 90 tablet 3    tirzepatide (MOUNJARO) 5 MG/0.5ML SOAJ auto-injector pen Inject 0.5 mLs (5 mg) subcutaneously once a week. 6 mL 0          Objective:    Vitals:    07/29/25 1146   BP: 110/80   Pulse: 111   Temp: 98  F (36.7  C)   SpO2: 100%   Weight: 95.7 kg (211 lb)   Height: 1.664 m (5' 5.5\")      Body mass index is 34.58 kg/m .    Alert.  No apparent distress.  Cardiac exam regular.  Extremities warm and dry.  BMI noted 34.58.      This note has been dictated using voice recognition software and as a result may contain minor grammatical errors and unintended word substitutions.       Answers submitted by the patient for this visit:  Diabetes Visit (Submitted on 7/29/2025)  Chief Complaint: Chronic problems general questions HPI Form  Frequency of checking blood sugars:: continous glucose monitor  What time of day are you checking your blood sugars : before and after meals, at bedtime  Have you had " any blood sugars above 200?: Yes  Have you had any blood sugars below 70?: Yes  Hypoglycemia symptoms:: shakiness, dizziness  Diabetic concerns:: none  Paraesthesia present:: burning in feet  Have you had a diabetic eye exam within the last year?: Yes  Date of last eye exam: : may  Where was this eye exam done?: [facility]  General Questionnaire (Submitted on 7/29/2025)  Chief Complaint: Chronic problems general questions HPI Form  How many servings of fruits and vegetables do you eat daily?: 2-3  On average, how many sweetened beverages do you drink each day (Examples: soda, juice, sweet tea, etc.  Do NOT count diet or artificially sweetened beverages)?: 1  How many minutes a day do you exercise enough to make your heart beat faster?: 10 to 19  How many days a week do you exercise enough to make your heart beat faster?: 3 or less  How many days per week do you miss taking your medication?: 6  What makes it hard for you to take your medication every day?: side effects, remembering to take  Questionnaire about: Chronic problems general questions HPI Form (Submitted on 7/29/2025)  Chief Complaint: Chronic problems general questions HPI Form

## 2025-08-20 DIAGNOSIS — E11.65 TYPE 2 DIABETES MELLITUS WITH HYPERGLYCEMIA, WITHOUT LONG-TERM CURRENT USE OF INSULIN (H): Primary | ICD-10-CM

## 2025-08-21 RX ORDER — HYDROCHLOROTHIAZIDE 12.5 MG/1
CAPSULE ORAL
Qty: 6 EACH | Refills: 3 | Status: SHIPPED | OUTPATIENT
Start: 2025-08-21

## 2025-08-25 ENCOUNTER — TELEPHONE (OUTPATIENT)
Dept: FAMILY MEDICINE | Facility: CLINIC | Age: 40
End: 2025-08-25
Payer: COMMERCIAL

## 2025-08-28 ENCOUNTER — TRANSFERRED RECORDS (OUTPATIENT)
Dept: HEALTH INFORMATION MANAGEMENT | Facility: CLINIC | Age: 40
End: 2025-08-28
Payer: COMMERCIAL

## (undated) DEVICE — SOL WATER IRRIG 500ML BOTTLE 2F7113

## (undated) DEVICE — DAVINCI XI DRAPE COLUMN 470341

## (undated) DEVICE — DAVINCI XI GRASPER ENDOWRIST PROGRASP 470093

## (undated) DEVICE — DAVINCI XI CAUTERY HOOK 470183

## (undated) DEVICE — DAVINCI XI CLIP APPLIER LG HEM-O-CLIP 8MM 470230

## (undated) DEVICE — LINEN TOWEL PACK X5 5464

## (undated) DEVICE — BLADE CLIPPER DISP 4406

## (undated) DEVICE — PACK LAP CHOLE CUSTOM ASC

## (undated) DEVICE — NDL INSUFFLATION 13GA 120MM C2201

## (undated) DEVICE — DAVINCI XI DRAPE ARM 470015

## (undated) DEVICE — SPECIMEN CONTAINER W/10% BUFFERED FORMALIN 120ML 591201

## (undated) DEVICE — Device

## (undated) DEVICE — DAVINCI XI FCP BIPOLAR FENESTRATED 470205

## (undated) DEVICE — GOWN XLG DISP 9545

## (undated) DEVICE — CLIP ENDO HEMO-LOC PURPLE LG 544240

## (undated) DEVICE — ENDO POUCH UNIVERSAL RETRIEVAL SYSTEM INZII 5MM CD003

## (undated) DEVICE — PREP CHLORAPREP 26ML TINTED HI-LITE ORANGE 930815

## (undated) DEVICE — SU DERMABOND ADVANCED .7ML DNX12

## (undated) DEVICE — DRSG GAUZE 4X4" 3033

## (undated) DEVICE — SU MONOCRYL 4-0 PS-2 27" UND Y426H

## (undated) DEVICE — DAVINCI XI SEAL UNIVERSAL 5-8MM 470361

## (undated) DEVICE — SYR 50ML SLIP TIP W/O NDL 309654

## (undated) DEVICE — ESU GROUND PAD ADULT W/CORD E7507

## (undated) DEVICE — GLOVE BIOGEL PI ULTRATOUCH SZ 7.5 41175

## (undated) RX ORDER — PROPOFOL 10 MG/ML
INJECTION, EMULSION INTRAVENOUS
Status: DISPENSED
Start: 2025-01-29

## (undated) RX ORDER — DEXMEDETOMIDINE HYDROCHLORIDE 4 UG/ML
INJECTION, SOLUTION INTRAVENOUS
Status: DISPENSED
Start: 2025-01-29

## (undated) RX ORDER — ONDANSETRON 2 MG/ML
INJECTION INTRAMUSCULAR; INTRAVENOUS
Status: DISPENSED
Start: 2025-01-29

## (undated) RX ORDER — CEFAZOLIN SODIUM 2 G/50ML
SOLUTION INTRAVENOUS
Status: DISPENSED
Start: 2025-01-29

## (undated) RX ORDER — ACETAMINOPHEN 325 MG/1
TABLET ORAL
Status: DISPENSED
Start: 2025-01-29

## (undated) RX ORDER — HYDROMORPHONE HYDROCHLORIDE 1 MG/ML
INJECTION, SOLUTION INTRAMUSCULAR; INTRAVENOUS; SUBCUTANEOUS
Status: DISPENSED
Start: 2025-01-29

## (undated) RX ORDER — BUPIVACAINE HYDROCHLORIDE 2.5 MG/ML
INJECTION, SOLUTION EPIDURAL; INFILTRATION; INTRACAUDAL
Status: DISPENSED
Start: 2025-01-29

## (undated) RX ORDER — OXYCODONE HYDROCHLORIDE 5 MG/1
TABLET ORAL
Status: DISPENSED
Start: 2025-01-29

## (undated) RX ORDER — KETOROLAC TROMETHAMINE 30 MG/ML
INJECTION, SOLUTION INTRAMUSCULAR; INTRAVENOUS
Status: DISPENSED
Start: 2025-01-29

## (undated) RX ORDER — FENTANYL CITRATE 50 UG/ML
INJECTION, SOLUTION INTRAMUSCULAR; INTRAVENOUS
Status: DISPENSED
Start: 2025-01-29

## (undated) RX ORDER — METOPROLOL TARTRATE 1 MG/ML
INJECTION, SOLUTION INTRAVENOUS
Status: DISPENSED
Start: 2025-01-29

## (undated) RX ORDER — DEXAMETHASONE SODIUM PHOSPHATE 4 MG/ML
INJECTION, SOLUTION INTRA-ARTICULAR; INTRALESIONAL; INTRAMUSCULAR; INTRAVENOUS; SOFT TISSUE
Status: DISPENSED
Start: 2025-01-29